# Patient Record
Sex: FEMALE | Race: WHITE | Employment: STUDENT | URBAN - METROPOLITAN AREA
[De-identification: names, ages, dates, MRNs, and addresses within clinical notes are randomized per-mention and may not be internally consistent; named-entity substitution may affect disease eponyms.]

---
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
## 2021-12-16 ENCOUNTER — OFFICE VISIT (OUTPATIENT)
Dept: FAMILY MEDICINE CLINIC | Age: 21
End: 2021-12-16
Payer: COMMERCIAL

## 2021-12-16 VITALS
SYSTOLIC BLOOD PRESSURE: 132 MMHG | BODY MASS INDEX: 43.4 KG/M2 | WEIGHT: 293 LBS | HEIGHT: 69 IN | HEART RATE: 105 BPM | RESPIRATION RATE: 20 BRPM | DIASTOLIC BLOOD PRESSURE: 88 MMHG | OXYGEN SATURATION: 97 %

## 2021-12-16 DIAGNOSIS — F41.9 ANXIETY: Primary | ICD-10-CM

## 2021-12-16 PROCEDURE — 99213 OFFICE O/P EST LOW 20 MIN: CPT | Performed by: FAMILY MEDICINE

## 2021-12-16 RX ORDER — HYDROXYZINE HYDROCHLORIDE 25 MG/1
25 TABLET, FILM COATED ORAL EVERY 8 HOURS PRN
Qty: 60 TABLET | Refills: 0 | Status: SHIPPED | OUTPATIENT
Start: 2021-12-16 | End: 2021-12-28

## 2021-12-16 RX ORDER — DUPILUMAB 300 MG/2ML
300 INJECTION, SOLUTION SUBCUTANEOUS
COMMUNITY
Start: 2021-12-16

## 2021-12-16 ASSESSMENT — PATIENT HEALTH QUESTIONNAIRE - PHQ9
SUM OF ALL RESPONSES TO PHQ QUESTIONS 1-9: 0
SUM OF ALL RESPONSES TO PHQ9 QUESTIONS 1 & 2: 0
SUM OF ALL RESPONSES TO PHQ QUESTIONS 1-9: 0
2. FEELING DOWN, DEPRESSED OR HOPELESS: 0
1. LITTLE INTEREST OR PLEASURE IN DOING THINGS: 0
SUM OF ALL RESPONSES TO PHQ QUESTIONS 1-9: 0

## 2021-12-26 ASSESSMENT — ENCOUNTER SYMPTOMS
NAUSEA: 0
SHORTNESS OF BREATH: 0
SORE THROAT: 0
COUGH: 0
RHINORRHEA: 0
VOMITING: 0
DIARRHEA: 0
BACK PAIN: 0
EYES NEGATIVE: 1
CHEST TIGHTNESS: 0
ABDOMINAL PAIN: 0

## 2021-12-26 NOTE — PROGRESS NOTES
[address] [place] [facility] [address]  Dept: [phone]  Dept Fax: [phone]  Loc: [phone]  PROGRESS NOTE      VisitDate: 12/16/2021    Carlotta Wilks is a 24 y.o. female who presents today for:     Chief Complaint   Patient presents with    Anxiety     grandma passed away this past weekend, several people she knows has covid, finished finals. Has always had anxiety but worse. Takes Melatonin at night to help sleep. Subjective:  HPI  Patient comes in with complaints of anxiety has been persisting and triggered by recent family and social events    Review of Systems   Constitutional: Negative for activity change, appetite change, fatigue and fever. HENT: Negative for congestion, rhinorrhea and sore throat. Eyes: Negative. Respiratory: Negative for cough, chest tightness and shortness of breath. Cardiovascular: Negative for chest pain and palpitations. Gastrointestinal: Negative for abdominal pain, diarrhea, nausea and vomiting. Genitourinary: Negative for dysuria and urgency. Musculoskeletal: Negative for arthralgias and back pain. Neurological: Negative for dizziness and headaches. Psychiatric/Behavioral: Negative for dysphoric mood. The patient is nervous/anxious. Past Medical History:   Diagnosis Date    Eczema       History reviewed. No pertinent surgical history.   Family History   Problem Relation Age of Onset    Diabetes Mother     Asthma Paternal Grandmother     Cancer Paternal Grandfather     Asthma Paternal Grandfather     Depression Father     Allergy (Severe) Father     Colon Cancer Paternal Aunt     Cancer Paternal Aunt         Ovarian     Social History     Tobacco Use    Smoking status: Never Smoker    Smokeless tobacco: Never Used   Substance Use Topics    Alcohol use: Not on file      Current Outpatient Medications   Medication Sig Dispense Refill    DUPIXENT 300 MG/2ML SOSY injection Inject 300 mg into the skin every 14 days       hydrOXYzine (ATARAX) 25 MG tablet Take 1 tablet by mouth every 8 hours as needed for Anxiety 60 tablet 0     No current facility-administered medications for this visit. No Known Allergies  Health Maintenance   Topic Date Due    Hepatitis C screen  Never done    COVID-19 Vaccine (1) Never done    HPV vaccine (3 - 2-dose series) 04/08/2011    HIV screen  Never done    Chlamydia screen  Never done    Pap smear  Never done    Flu vaccine (1) Never done    DTaP/Tdap/Td vaccine (6 - Td or Tdap) 08/01/2027    Hepatitis B vaccine  Completed    Hib vaccine  Completed    Varicella vaccine  Completed    Meningococcal (ACWY) vaccine  Completed    Hepatitis A vaccine  Aged Out    Pneumococcal 0-64 years Vaccine  Aged Out         Objective:     Physical Exam  Constitutional:       General: She is not in acute distress. Appearance: She is well-developed. She is not diaphoretic. HENT:      Head: Normocephalic and atraumatic. Eyes:      General: No scleral icterus. Conjunctiva/sclera: Conjunctivae normal.   Neck:      Thyroid: No thyromegaly. Vascular: No JVD. Comments: No bruits  Cardiovascular:      Rate and Rhythm: Normal rate and regular rhythm. Heart sounds: Normal heart sounds. Pulmonary:      Effort: Pulmonary effort is normal. No respiratory distress. Breath sounds: Normal breath sounds. No wheezing or rales. Abdominal:      Palpations: Abdomen is soft. There is no mass. Tenderness: There is no abdominal tenderness. There is no guarding. Musculoskeletal:         General: No tenderness. Skin:     General: Skin is warm and dry. Findings: No rash. Neurological:      Mental Status: She is alert and oriented to person, place, and time. Cranial Nerves: No cranial nerve deficit.        /88   Pulse 105   Resp 20   Ht 5' 8.5\" (1.74 m)   Wt (!) 325 lb (147.4 kg)   SpO2 97%   BMI 48.70 kg/m²       Impression/Plan:  1. Anxiety      Requested Prescriptions     Signed Prescriptions Disp Refills    hydrOXYzine (ATARAX) 25 MG tablet 60 tablet 0     Sig: Take 1 tablet by mouth every 8 hours as needed for Anxiety     No orders of the defined types were placed in this encounter. Patient giveneducational materials - see patient instructions. Discussed use, benefit, and side effects of prescribed medications. All patient questions answered. Pt voiced understanding. Reviewed health maintenance. Patient agreedwith treatment plan. Follow up as directed. **This report has been created using voice recognition software. It may contain minor errorswhich are inherent in voice recognition technology. **       Electronically signed by Alberto Sarah MD on 12/26/2021 at 10:27 AM

## 2021-12-28 ENCOUNTER — OFFICE VISIT (OUTPATIENT)
Dept: FAMILY MEDICINE CLINIC | Age: 21
End: 2021-12-28
Payer: COMMERCIAL

## 2021-12-28 VITALS
TEMPERATURE: 98.3 F | HEIGHT: 68 IN | WEIGHT: 293 LBS | RESPIRATION RATE: 20 BRPM | BODY MASS INDEX: 44.41 KG/M2 | DIASTOLIC BLOOD PRESSURE: 84 MMHG | SYSTOLIC BLOOD PRESSURE: 124 MMHG | HEART RATE: 84 BPM

## 2021-12-28 DIAGNOSIS — F43.22 ADJUSTMENT DISORDER WITH ANXIOUS MOOD: Primary | ICD-10-CM

## 2021-12-28 PROCEDURE — 99213 OFFICE O/P EST LOW 20 MIN: CPT | Performed by: FAMILY MEDICINE

## 2021-12-28 RX ORDER — ESCITALOPRAM OXALATE 10 MG/1
10 TABLET ORAL DAILY
Qty: 30 TABLET | Refills: 5 | Status: ON HOLD | OUTPATIENT
Start: 2021-12-28 | End: 2022-01-11 | Stop reason: HOSPADM

## 2021-12-28 RX ORDER — CLONAZEPAM 0.5 MG/1
TABLET ORAL
Qty: 60 TABLET | Refills: 0 | Status: ON HOLD | OUTPATIENT
Start: 2021-12-28 | End: 2022-01-11 | Stop reason: HOSPADM

## 2021-12-28 RX ORDER — CLONAZEPAM 0.5 MG/1
0.5 TABLET ORAL 2 TIMES DAILY PRN
COMMUNITY
Start: 2021-12-19 | End: 2021-12-28 | Stop reason: SDUPTHER

## 2021-12-28 SDOH — ECONOMIC STABILITY: FOOD INSECURITY: WITHIN THE PAST 12 MONTHS, THE FOOD YOU BOUGHT JUST DIDN'T LAST AND YOU DIDN'T HAVE MONEY TO GET MORE.: NEVER TRUE

## 2021-12-28 SDOH — ECONOMIC STABILITY: FOOD INSECURITY: WITHIN THE PAST 12 MONTHS, YOU WORRIED THAT YOUR FOOD WOULD RUN OUT BEFORE YOU GOT MONEY TO BUY MORE.: NEVER TRUE

## 2021-12-28 ASSESSMENT — SOCIAL DETERMINANTS OF HEALTH (SDOH): HOW HARD IS IT FOR YOU TO PAY FOR THE VERY BASICS LIKE FOOD, HOUSING, MEDICAL CARE, AND HEATING?: NOT HARD AT ALL

## 2022-01-02 ASSESSMENT — ENCOUNTER SYMPTOMS
RHINORRHEA: 0
VOMITING: 0
COUGH: 0
BACK PAIN: 0
EYES NEGATIVE: 1
SORE THROAT: 0
SHORTNESS OF BREATH: 0
DIARRHEA: 0
ABDOMINAL PAIN: 0
CHEST TIGHTNESS: 0
NAUSEA: 0

## 2022-01-02 NOTE — PROGRESS NOTES
Refill    escitalopram (LEXAPRO) 10 MG tablet Take 1 tablet by mouth daily 30 tablet 5    clonazePAM (KLONOPIN) 0.5 MG tablet 0.25-0.5mg bid prn 60 tablet 0    DUPIXENT 300 MG/2ML SOSY injection Inject 300 mg into the skin every 14 days        No current facility-administered medications for this visit. No Known Allergies  Health Maintenance   Topic Date Due    Hepatitis C screen  Never done    COVID-19 Vaccine (1) Never done    HPV vaccine (3 - 2-dose series) 04/08/2011    HIV screen  Never done    Chlamydia screen  Never done    Pap smear  Never done    Flu vaccine (1) 12/28/2022 (Originally 9/1/2021)    Depression Screen  12/16/2022    DTaP/Tdap/Td vaccine (6 - Td or Tdap) 08/01/2027    Hepatitis B vaccine  Completed    Hib vaccine  Completed    Varicella vaccine  Completed    Meningococcal (ACWY) vaccine  Completed    Hepatitis A vaccine  Aged Out    Pneumococcal 0-64 years Vaccine  Aged Out         Objective:     Physical Exam  Constitutional:       General: She is not in acute distress. Appearance: She is well-developed. She is not diaphoretic. HENT:      Head: Normocephalic and atraumatic. Eyes:      General: No scleral icterus. Conjunctiva/sclera: Conjunctivae normal.   Neck:      Thyroid: No thyromegaly. Vascular: No JVD. Comments: No bruits  Cardiovascular:      Rate and Rhythm: Normal rate and regular rhythm. Heart sounds: Normal heart sounds. Pulmonary:      Effort: Pulmonary effort is normal. No respiratory distress. Breath sounds: Normal breath sounds. No wheezing or rales. Abdominal:      Palpations: Abdomen is soft. There is no mass. Tenderness: There is no abdominal tenderness. There is no guarding. Musculoskeletal:         General: No tenderness. Skin:     General: Skin is warm and dry. Findings: No rash. Neurological:      Mental Status: She is alert and oriented to person, place, and time.       Cranial Nerves: No cranial nerve deficit. /84   Pulse 84   Temp 98.3 °F (36.8 °C) (Oral)   Resp 20   Ht 5' 8\" (1.727 m)   Wt (!) 324 lb (147 kg)   LMP 2021 (Approximate)   BMI 49.26 kg/m²       Impression/Plan:  1. Adjustment disorder with anxious mood      Requested Prescriptions     Signed Prescriptions Disp Refills    escitalopram (LEXAPRO) 10 MG tablet 30 tablet 5     Sig: Take 1 tablet by mouth daily    clonazePAM (KLONOPIN) 0.5 MG tablet 60 tablet 0     Si.25-0.5mg bid prn     No orders of the defined types were placed in this encounter. Patient giveneducational materials - see patient instructions. Discussed use, benefit, and side effects of prescribed medications. All patient questions answered. Pt voiced understanding. Reviewed health maintenance. Patient agreedwith treatment plan. Follow up as directed. **This report has been created using voice recognition software. It may contain minor errorswhich are inherent in voice recognition technology. **       Electronically signed by Tha Singh MD on 2022 at 8:12 AM

## 2022-01-09 ENCOUNTER — HOSPITAL ENCOUNTER (INPATIENT)
Age: 22
LOS: 2 days | Discharge: HOME | DRG: 885 | End: 2022-01-11
Attending: EMERGENCY MEDICINE | Admitting: PSYCHIATRY & NEUROLOGY
Payer: COMMERCIAL

## 2022-01-09 DIAGNOSIS — R45.851 SUICIDAL IDEATION: Primary | ICD-10-CM

## 2022-01-09 PROBLEM — F33.9 MDD (MAJOR DEPRESSIVE DISORDER), RECURRENT EPISODE (HCC): Status: ACTIVE | Noted: 2022-01-09

## 2022-01-09 LAB
ACETAMINOPHEN LEVEL: < 5 UG/ML (ref 0–20)
ALBUMIN SERPL-MCNC: 4.6 G/DL (ref 3.5–5.1)
ALP BLD-CCNC: 140 U/L (ref 38–126)
ALT SERPL-CCNC: 28 U/L (ref 11–66)
AMPHETAMINE+METHAMPHETAMINE URINE SCREEN: NEGATIVE
ANION GAP SERPL CALCULATED.3IONS-SCNC: 10 MEQ/L (ref 8–16)
AST SERPL-CCNC: 22 U/L (ref 5–40)
BARBITURATE QUANTITATIVE URINE: NEGATIVE
BASOPHILS # BLD: 0.4 %
BASOPHILS ABSOLUTE: 0 THOU/MM3 (ref 0–0.1)
BENZODIAZEPINE QUANTITATIVE URINE: NEGATIVE
BILIRUB SERPL-MCNC: 0.2 MG/DL (ref 0.3–1.2)
BUN BLDV-MCNC: 8 MG/DL (ref 7–22)
CALCIUM SERPL-MCNC: 9.3 MG/DL (ref 8.5–10.5)
CANNABINOID QUANTITATIVE URINE: NEGATIVE
CHLORIDE BLD-SCNC: 103 MEQ/L (ref 98–111)
CO2: 26 MEQ/L (ref 23–33)
COCAINE METABOLITE QUANTITATIVE URINE: NEGATIVE
CREAT SERPL-MCNC: 0.8 MG/DL (ref 0.4–1.2)
EOSINOPHIL # BLD: 0.8 %
EOSINOPHILS ABSOLUTE: 0.1 THOU/MM3 (ref 0–0.4)
ERYTHROCYTE [DISTWIDTH] IN BLOOD BY AUTOMATED COUNT: 13.1 % (ref 11.5–14.5)
ERYTHROCYTE [DISTWIDTH] IN BLOOD BY AUTOMATED COUNT: 40.4 FL (ref 35–45)
ETHYL ALCOHOL, SERUM: < 0.01 %
GFR SERPL CREATININE-BSD FRML MDRD: 89 ML/MIN/1.73M2
GLUCOSE BLD-MCNC: 119 MG/DL (ref 70–108)
HCT VFR BLD CALC: 46.9 % (ref 37–47)
HEMOGLOBIN: 14.9 GM/DL (ref 12–16)
IMMATURE GRANS (ABS): 0.02 THOU/MM3 (ref 0–0.07)
IMMATURE GRANULOCYTES: 0.2 %
LYMPHOCYTES # BLD: 10.6 %
LYMPHOCYTES ABSOLUTE: 0.9 THOU/MM3 (ref 1–4.8)
MCH RBC QN AUTO: 27.1 PG (ref 26–33)
MCHC RBC AUTO-ENTMCNC: 31.8 GM/DL (ref 32.2–35.5)
MCV RBC AUTO: 85.4 FL (ref 81–99)
MONOCYTES # BLD: 6.3 %
MONOCYTES ABSOLUTE: 0.5 THOU/MM3 (ref 0.4–1.3)
NUCLEATED RED BLOOD CELLS: 0 /100 WBC
OPIATES, URINE: NEGATIVE
OSMOLALITY CALCULATION: 277 MOSMOL/KG (ref 275–300)
OXYCODONE: NEGATIVE
PHENCYCLIDINE QUANTITATIVE URINE: NEGATIVE
PLATELET # BLD: 234 THOU/MM3 (ref 130–400)
PMV BLD AUTO: 9.5 FL (ref 9.4–12.4)
POTASSIUM REFLEX MAGNESIUM: 4.3 MEQ/L (ref 3.5–5.2)
PREGNANCY, SERUM: NEGATIVE
RBC # BLD: 5.49 MILL/MM3 (ref 4.2–5.4)
SALICYLATE, SERUM: < 0.3 MG/DL (ref 2–10)
SEG NEUTROPHILS: 81.7 %
SEGMENTED NEUTROPHILS ABSOLUTE COUNT: 6.9 THOU/MM3 (ref 1.8–7.7)
SODIUM BLD-SCNC: 139 MEQ/L (ref 135–145)
T4 FREE: 1.34 NG/DL (ref 0.93–1.76)
TOTAL PROTEIN: 8.4 G/DL (ref 6.1–8)
TROPONIN T: < 0.01 NG/ML
TSH SERPL DL<=0.05 MIU/L-ACNC: 0.68 UIU/ML (ref 0.4–4.2)
WBC # BLD: 8.5 THOU/MM3 (ref 4.8–10.8)

## 2022-01-09 PROCEDURE — 6370000000 HC RX 637 (ALT 250 FOR IP): Performed by: PSYCHIATRY & NEUROLOGY

## 2022-01-09 PROCEDURE — 82077 ASSAY SPEC XCP UR&BREATH IA: CPT

## 2022-01-09 PROCEDURE — 80053 COMPREHEN METABOLIC PANEL: CPT

## 2022-01-09 PROCEDURE — 84703 CHORIONIC GONADOTROPIN ASSAY: CPT

## 2022-01-09 PROCEDURE — 1240000000 HC EMOTIONAL WELLNESS R&B

## 2022-01-09 PROCEDURE — 80143 DRUG ASSAY ACETAMINOPHEN: CPT

## 2022-01-09 PROCEDURE — 80179 DRUG ASSAY SALICYLATE: CPT

## 2022-01-09 PROCEDURE — 99285 EMERGENCY DEPT VISIT HI MDM: CPT

## 2022-01-09 PROCEDURE — 84443 ASSAY THYROID STIM HORMONE: CPT

## 2022-01-09 PROCEDURE — 36415 COLL VENOUS BLD VENIPUNCTURE: CPT

## 2022-01-09 PROCEDURE — 85025 COMPLETE CBC W/AUTO DIFF WBC: CPT

## 2022-01-09 PROCEDURE — 84439 ASSAY OF FREE THYROXINE: CPT

## 2022-01-09 PROCEDURE — 84484 ASSAY OF TROPONIN QUANT: CPT

## 2022-01-09 PROCEDURE — 80307 DRUG TEST PRSMV CHEM ANLYZR: CPT

## 2022-01-09 RX ORDER — HYDROXYZINE HYDROCHLORIDE 25 MG/1
50 TABLET, FILM COATED ORAL 3 TIMES DAILY PRN
Status: DISCONTINUED | OUTPATIENT
Start: 2022-01-09 | End: 2022-01-09

## 2022-01-09 RX ORDER — TRAZODONE HYDROCHLORIDE 50 MG/1
50 TABLET ORAL NIGHTLY PRN
Status: DISCONTINUED | OUTPATIENT
Start: 2022-01-09 | End: 2022-01-11 | Stop reason: HOSPADM

## 2022-01-09 RX ORDER — POLYETHYLENE GLYCOL 3350 17 G/17G
17 POWDER, FOR SOLUTION ORAL DAILY PRN
Status: DISCONTINUED | OUTPATIENT
Start: 2022-01-09 | End: 2022-01-11 | Stop reason: HOSPADM

## 2022-01-09 RX ORDER — MAGNESIUM HYDROXIDE/ALUMINUM HYDROXICE/SIMETHICONE 120; 1200; 1200 MG/30ML; MG/30ML; MG/30ML
30 SUSPENSION ORAL EVERY 6 HOURS PRN
Status: DISCONTINUED | OUTPATIENT
Start: 2022-01-09 | End: 2022-01-11 | Stop reason: HOSPADM

## 2022-01-09 RX ORDER — ACETAMINOPHEN 325 MG/1
650 TABLET ORAL EVERY 4 HOURS PRN
Status: DISCONTINUED | OUTPATIENT
Start: 2022-01-09 | End: 2022-01-11 | Stop reason: HOSPADM

## 2022-01-09 RX ORDER — HYDROXYZINE PAMOATE 50 MG/1
50 CAPSULE ORAL 4 TIMES DAILY PRN
Status: DISCONTINUED | OUTPATIENT
Start: 2022-01-09 | End: 2022-01-11 | Stop reason: HOSPADM

## 2022-01-09 RX ORDER — IBUPROFEN 400 MG/1
400 TABLET ORAL EVERY 6 HOURS PRN
Status: DISCONTINUED | OUTPATIENT
Start: 2022-01-09 | End: 2022-01-11 | Stop reason: HOSPADM

## 2022-01-09 RX ADMIN — TRAZODONE HYDROCHLORIDE 50 MG: 50 TABLET ORAL at 21:08

## 2022-01-09 RX ADMIN — HYDROXYZINE PAMOATE 50 MG: 50 CAPSULE ORAL at 22:25

## 2022-01-09 ASSESSMENT — SLEEP AND FATIGUE QUESTIONNAIRES
RESTFUL SLEEP: NO
DO YOU USE A SLEEP AID: NO
DIFFICULTY STAYING ASLEEP: YES
DIFFICULTY FALLING ASLEEP: YES
DIFFICULTY ARISING: NO
SLEEP PATTERN: DIFFICULTY FALLING ASLEEP;DISTURBED/INTERRUPTED SLEEP
DO YOU HAVE DIFFICULTY SLEEPING: YES

## 2022-01-09 ASSESSMENT — PAIN SCALES - GENERAL
PAINLEVEL_OUTOF10: 0
PAINLEVEL_OUTOF10: 0

## 2022-01-09 ASSESSMENT — PATIENT HEALTH QUESTIONNAIRE - PHQ9: SUM OF ALL RESPONSES TO PHQ QUESTIONS 1-9: 20

## 2022-01-09 ASSESSMENT — LIFESTYLE VARIABLES: HISTORY_ALCOHOL_USE: NO

## 2022-01-09 NOTE — ED TRIAGE NOTES
thoughs of hurting self, but no plan. Had a couple of family members recently pass away and has had depression and anixety for last month. Can not find rlief.

## 2022-01-09 NOTE — ED NOTES
Pt transported to [phone] accompanied by campus police.      Lissette Elias, EMT-P  01/09/22 5038

## 2022-01-09 NOTE — PROGRESS NOTES
Chief Complaint:    Interrupted Suicide Attempt      Provisional Diagnosis: Unspecified Depressive Disorder       Risk, Psychosocial and Contextual Factors: (homeless, lack of social support etc.): Paternal grandmother  a month ago, maternal uncle committed suicide a week ago       Current MH Treatment:  PCP prescribed psychotropic medication, pt is awaiting a response from the [address] [facility] in [facility] to initiate counseling services       Present Suicidal Behavior:    Verbal: Denies current suicidal thoughts     Attempt: Pt planned to overdose on her medication today however her brother walked in, interrupted attempt       Access to Weapons: Denies      C-SSRS Current Suicide Risk: Low, Moderate or High:    High      Past Suicidal Behavior:    Verbal:  X (Suicidal thoughts during the last couple of days)    Attempts: Denies       Self-Injurious/Self-Mutilation:  Denies       Traumatic Event Within Past 2 Weeks: Maternal uncle committed suicide       Current Abuse:  Denies       Legal: Denies       Violence: Denies       Protective Factors:  Family are supportive, pt is seeking mental health treatment       Housing: Resides with mother, father and two brothers       CPAP/Oxygen/Ambulation Difficulties: Denies       Basic Vital Signs: See epic      Critical Labs:      Risk Factors: Interrupted suicide attempt today      Clinical Summary:      Pt is a 24year old female with a reported history of anxiety and depression escorted to [facility] ED by her mother and brother due to an interrupted suicide attempt. Pt reportedly was going to attempt suicide by overdose today, reportedly had the pills and was going to do so however her brother walked in \"then I didn't want to do it anymore\".  Pt report struggling with depression and anxiety during the last month, started on klonopin and lexapro by PCP on 21, is compliant with taking prescribed medication, no history of inpatient psychiatric treatment, pt is attempting to be linked to outpatient counseling with the [facility] in [facility] [place] \"I'm waiting on a call back\". Pt state her paternal grandmother  a month ago, maternal uncle committed suicide a week ago \"and I seen him, I can't get it out of my head\". 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 Pt is employed at [employer] however currently on a leave of absence due to anxiety and depression. Pt denies drug/alcohol use, report loss of sleep, increased appetite since starting medication. Pt is alert, oriented x4, good insight, impaired judgment, linear thought, good eye contact, cooperative. Level of Care Disposition:      9462  Pt given a bottle of water as requested, no further needs expressed. 1615  Pt sitting on cot talking to mother and brother. 1636  Pt has a small urine sample, ED Charge Nurse Paige Rod informed. UDS needed to consult with the on-call psychiatrist.    1640  Urine sample collected by ED Nurse. 46  Pt medically cleared by Dr. Scout Guillen.    [id number]  Consult with Dr. Carlyle Jack who recommend inpatient psychiatric treatment and authorized admission to .    1722  Pt and her family updated, pt reviewed and signed the voluntary admission form. 1727  Report called to Nurse Gracy Lyon on 4E, pt to be admitted to -66B.

## 2022-01-09 NOTE — PROGRESS NOTES
Behavioral Health   Admission Note     Admission Type:        Reason for admission:       PATIENT STRENGTHS:  Strengths: Positive Support,Communication,Medication Compliance    Patient Strengths and Limitations:       Addictive Behavior:   Addictive Behavior  In the past 3 months, have you felt or has someone told you that you have a problem with:  : None  Do you have a history of Chemical Use?: No  Do you have a history of Alcohol Use?: No  Do you have a history of Street Drug Abuse?: No  Histroy of Prescripton Drug Abuse?: No    Medical Problems:   Past Medical History:   Diagnosis Date    Eczema        Status EXAM:  Status and Exam  Normal: No  Affect: Constricted  Level of Consciousness: Alert  Mood:Normal: No  Mood: Depressed,Anxious  Motor Activity:Normal: Yes  Interview Behavior: Cooperative  Preception: [place] to Person,[place] to Time,[place] to Place,[place] to Situation  Attention:Normal: Yes  Thought Processes: Other(See comment) (Linear)  Thought Content:Normal: Yes  Hallucinations: None  Delusions: No  Memory:Normal: Yes  Insight and Judgment: No  Insight and Judgment: Poor Judgment  Present Suicidal Ideation: No (No current suicidal thoughts, interrupted suicide attempt today)  Present Homicidal Ideation: No    Pt admitted with followings belongings:        Admission order obtained YES. Valuables sent home with N/A. Valuables placed in safe in security envelope, number:  N/A. Patient's home medications were N/A. Patient oriented to surroundings and program expectations and copy of patient rights given. Received admission packet:  YES. Consents reviewed, signed YES. \"An Important Message from [employer] About Your Rights\" form reviewed, signed N/A. Refused N/A. Patient verbalize understanding:  YES. Patient education on precautions: YES           Patient screened positive for suicide risk on CSSR-S (\"yes\" to question #4, 5, OR 6)  N/A. Physician notified of risk score.   Orders received N/A .  2 person skin assessment completed upon admission pt refused 2 person skin assessment. Explained patients right to have family, representative or physician notified of their admission. Patient has Declined for physician to be notified. Patient has Declined for family/representative to be notified. Provided pt with [employer] Online handout entitled \"Quitting Smoking. \"  Reviewed handout with pt addressing dangers of smoking, developing coping skills, and providing basic information about quitting. Pt response to counseling:  Pt does not smoke. Pt came to 4E from the [facility]. She has an interrupted suicide attempt today. 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 Her grandmother  a month ago and her uncle committed suicide a week ago. She has been waiting for [id number] to get back to her for counseling. Her anxiety was too much to bear today and the Klonopin was not working, nor was the Lexapro. Hyroxyzine gave her a panic attack. She lives at home with her mother and brothers. She states that she has lots of support.           Haley Santiago RN

## 2022-01-09 NOTE — ED PROVIDER NOTES
[address] [facility] [address] [address] EMERGENCY DEPT    EMERGENCY MEDICINE     Pt Name: Emery Plasencia  MRN: [id number]  [place] 2000  Date of evaluation: 1/9/2022  Provider: Fredrick Ramos DO, FACEP    CHIEF COMPLAINT     No chief complaint on file. HISTORY OF PRESENT ILLNESS    Emery Plasencia is a pleasant 24 y.o. female   Presents to the emergency department from home   Thoughts of self harm today with plans to take pills to end her life  Did not ingest any medications other than 1 dose of Klonopin  Has been depressed  No recent illnesses  No auditory or visual hallucinations      Triage notes and Nursing notes were reviewed by myself. Any discrepancies are addressed above. PAST MEDICAL HISTORY     Past Medical History:   Diagnosis Date    Eczema        SURGICAL HISTORY     No past surgical history on file. CURRENT MEDICATIONS       Previous Medications    CLONAZEPAM (KLONOPIN) 0.5 MG TABLET    0.25-0.5mg bid prn    DUPIXENT 300 MG/2ML SOSY INJECTION    Inject 300 mg into the skin every 14 days     ESCITALOPRAM (LEXAPRO) 10 MG TABLET    Take 1 tablet by mouth daily       ALLERGIES     Patient has no known allergies.     FAMILY HISTORY       Family History   Problem Relation Age of Onset    Diabetes Mother     Asthma Paternal Grandmother     Cancer Paternal Grandfather     Asthma Paternal Grandfather     Depression Father     Allergy (Severe) Father     Colon Cancer Paternal Aunt     Cancer Paternal Aunt         Ovarian        SOCIAL HISTORY       Social History     Socioeconomic History    Marital status: Single     Spouse name: Not on file    Number of children: Not on file    Years of education: Not on file    Highest education level: Not on file   Occupational History    Not on file   Tobacco Use    Smoking status: Never Smoker    Smokeless tobacco: Never Used   Substance and Sexual Activity    Alcohol use: Not on file    Drug use: Not on file    Sexual activity: Not on file   Other Topics Concern    Not on file   Social History Narrative    Not on file     Social Determinants of Health     Financial Resource Strain: Low Risk     Difficulty of Paying Living Expenses: Not hard at all   Food Insecurity: No Food Insecurity    Worried About Running Out of Food in the Last Year: Never true    [address] in the Last Year: Never true   Transportation Needs:     Lack of Transportation (Medical): Not on file    Lack of Transportation (Non-Medical): Not on file   Physical Activity:     Days of Exercise per Week: Not on file    Minutes of Exercise per Session: Not on file   Stress:     Feeling of Stress : Not on file   Social Connections:     Frequency of Communication with Friends and Family: Not on file    Frequency of Social Gatherings with Friends and Family: Not on file    Attends [employer] Services: Not on file    Active Member of [address] [place] [address] [employer] or Organizations: Not on file    Attends Club or Organization Meetings: Not on file    Marital Status: Not on file   Intimate Partner Violence:     Fear of Current or Ex-Partner: Not on file    Emotionally Abused: Not on file    Physically Abused: Not on file    Sexually Abused: Not on file   Housing Stability:     Unable to Pay for Housing in the Last Year: Not on file    Number of [place] in the Last Year: Not on file    Unstable Housing in the Last Year: Not on file       REVIEW OF SYSTEMS     Review of Systems   Constitutional: Negative for chills and fever. Psychiatric/Behavioral: Positive for suicidal ideas. Negative for self-injury. Except as noted above the remainder of the review of systems was reviewed and is. PHYSICAL EXAM    (up to 7 for level 4, 8 or more for level 5)     ED Triage Vitals [01/09/22 1359]   BP Temp Temp Source Pulse Resp SpO2 Height Weight   (!) 150/100 98.7 °F (37.1 °C) Oral 98 18 97 % 5' 8\" (1.727 m) (!) 325 lb (147.4 kg)       Physical Exam  Vitals and nursing note reviewed.    Constitutional: Appearance: She is well-developed. HENT:      Head: Normocephalic. Eyes:      Conjunctiva/sclera: Conjunctivae normal.      Pupils: Pupils are equal, round, and reactive to light. Neck:      Trachea: No tracheal deviation. Pulmonary:      Effort: Pulmonary effort is normal.   Musculoskeletal:         General: Normal range of motion. Cervical back: Neck supple. Skin:     General: Skin is warm and dry. Neurological:      Mental Status: She is alert and oriented to person, place, and time. Cranial Nerves: No cranial nerve deficit. DIAGNOSTIC RESULTS     EKG:(none if blank)  All E[facility]'s are [facility] who either signs or Co-signs this chart in the absence of a cardiologist.        RADIOLOGY: (none if blank)   Interpretation per the Radiologistbelow, if available at the time of this note:    No orders to display       LABS:  Labs Reviewed   CBC WITH AUTO DIFFERENTIAL - Abnormal; Notable for the following components:       Result Value    RBC 5.49 (*)     MCHC 31.8 (*)     Lymphocytes Absolute 0.9 (*)     All other components within normal limits   COMPREHENSIVE METABOLIC PANEL W/ REFLEX TO MG FOR LOW K - Abnormal; Notable for the following components:    Glucose 119 (*)     Alkaline Phosphatase 140 (*)     Total Protein 8.4 (*)     Total Bilirubin 0.2 (*)     All other components within normal limits   SALICYLATE LEVEL - Abnormal; Notable for the following components:    Salicylate, Serum < 0.3 (*)     All other components within normal limits   GLOMERULAR FILTRATION RATE, ESTIMATED - Abnormal; Notable for the following components:    Est, Glom Filt Rate 89 (*)     All other components within normal limits   TROPONIN   HCG, SERUM, QUALITATIVE   ACETAMINOPHEN LEVEL   ETHANOL   TSH WITH REFLEX   ANION GAP   OSMOLALITY   URINE DRUG SCREEN       All other labs were within normal range or not returned as of this dictation.   Please note, any cultures that may have been sent were not resulted at the time of this patient visit. EMERGENCY DEPARTMENT COURSE andMedical Decision Making:     MDM/  ED Course as of 01/09/22 1711   Carlos Alberto Smith Jan 09, 2022   1708 Based on physical exam, clinical presentation, and laboratory evaluation there is no medical reason to prevent him from a psychiatric hospitazation.     [AB]      ED Course User Index  [AB] Nicole Nguyen DO       ED Medications administered this visit:  Medications - No data to display      Procedures: (None if blank)      The care of the patient took place at a time of a significant regional and national Covid-19 surge, resulting in severe overcrowding and limitation of healthcare resources, including nursing staffing and inpatient beds. CLINICAL IMPRESSION     1. Suicidal ideation          DISPOSITION/PLAN   DISPOSITION Decision To Admit 01/09/2022 05:11:02 PM      PATIENT REFERRED TO:  No follow-up provider specified.     DISCHARGE MEDICATIONS:  New Prescriptions    No medications on file           (Please note that portions of this note were completed with a voice recognition program.  Efforts were made to edit the dictations but occasionallywords are mis-transcribed.)      Karlie Long DO,LETICIA (electronically signed)  Attending Physician, Emergency [address] [place] [address] DO oJaquin  01/09/22 1711

## 2022-01-10 ENCOUNTER — APPOINTMENT (OUTPATIENT)
Dept: GENERAL RADIOLOGY | Age: 22
DRG: 885 | End: 2022-01-10
Payer: COMMERCIAL

## 2022-01-10 PROBLEM — F32.A DEPRESSION WITH SUICIDAL IDEATION: Status: ACTIVE | Noted: 2022-01-10

## 2022-01-10 PROBLEM — F32.2 MDD (MAJOR DEPRESSIVE DISORDER), SINGLE EPISODE, SEVERE , NO PSYCHOSIS (HCC): Status: ACTIVE | Noted: 2022-01-09

## 2022-01-10 PROBLEM — R45.851 DEPRESSION WITH SUICIDAL IDEATION: Status: ACTIVE | Noted: 2022-01-10

## 2022-01-10 PROBLEM — F41.0 PANIC DISORDER WITHOUT AGORAPHOBIA: Chronic | Status: ACTIVE | Noted: 2022-01-10

## 2022-01-10 LAB
INFLUENZA A: NOT DETECTED
INFLUENZA B: NOT DETECTED
SARS-COV-2 RNA, RT PCR: DETECTED

## 2022-01-10 PROCEDURE — 99232 SBSQ HOSP IP/OBS MODERATE 35: CPT | Performed by: HOSPITALIST

## 2022-01-10 PROCEDURE — 87636 SARSCOV2 & INF A&B AMP PRB: CPT

## 2022-01-10 PROCEDURE — 71046 X-RAY EXAM CHEST 2 VIEWS: CPT

## 2022-01-10 PROCEDURE — 6370000000 HC RX 637 (ALT 250 FOR IP): Performed by: PSYCHIATRY & NEUROLOGY

## 2022-01-10 PROCEDURE — 1240000000 HC EMOTIONAL WELLNESS R&B

## 2022-01-10 RX ORDER — ESCITALOPRAM OXALATE 20 MG/1
20 TABLET ORAL NIGHTLY
Status: DISCONTINUED | OUTPATIENT
Start: 2022-01-10 | End: 2022-01-11 | Stop reason: HOSPADM

## 2022-01-10 RX ORDER — ALBUTEROL SULFATE 90 UG/1
2 AEROSOL, METERED RESPIRATORY (INHALATION) EVERY 4 HOURS PRN
Status: DISCONTINUED | OUTPATIENT
Start: 2022-01-10 | End: 2022-01-11 | Stop reason: HOSPADM

## 2022-01-10 RX ADMIN — HYDROXYZINE PAMOATE 50 MG: 50 CAPSULE ORAL at 09:57

## 2022-01-10 RX ADMIN — TRAZODONE HYDROCHLORIDE 50 MG: 50 TABLET ORAL at 21:58

## 2022-01-10 RX ADMIN — IBUPROFEN 400 MG: 400 TABLET, FILM COATED ORAL at 09:57

## 2022-01-10 RX ADMIN — ESCITALOPRAM OXALATE 20 MG: 20 TABLET ORAL at 21:57

## 2022-01-10 ASSESSMENT — PAIN SCALES - GENERAL
PAINLEVEL_OUTOF10: 3
PAINLEVEL_OUTOF10: 3

## 2022-01-10 ASSESSMENT — PAIN - FUNCTIONAL ASSESSMENT: PAIN_FUNCTIONAL_ASSESSMENT: ACTIVITIES ARE NOT PREVENTED

## 2022-01-10 ASSESSMENT — PAIN DESCRIPTION - FREQUENCY: FREQUENCY: INTERMITTENT

## 2022-01-10 NOTE — PROGRESS NOTES
Pt has a good support system. 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 Pt does feel that therapy would be beneficial and is attempting to get connected to [facility]. Family is positive for anxiety and depression in her father and a brother. She has an uncle who she was not close to who had alcoholism and completed suicide.

## 2022-01-10 NOTE — PROGRESS NOTES
Spoke with patient's parents with Patient's verbal permission after they arrived for visitation. Explained patient's current isolation status and current plan of care. Patient's parents verbalize understanding and agreement. MD made aware.

## 2022-01-10 NOTE — H&P
[address] [place] [address]                              HISTORY AND PHYSICAL    PATIENT NAME: Joseph Hsu                   :        2000  MED REC NO:   [id number]                           ROOM:       [id number]  ACCOUNT NO:   [de-identified]                           ADMIT DATE: 2022  PROVIDER:     Sharlene Sparks M.D. IDENTIFYING INFORMATION:  The patient is a 69-year-old single   female. She has no children. She lives with her parents and two of her  brothers. She works part time at [employer]. She is also a full-time  student at [employer]. CHIEF COMPLAINT:  \"I tried to kill myself yesterday. \"    HISTORY OF PRESENT ILLNESS:  The patient presented to  [facility] ED with her mother and one of her brothers due to an interrupted  suicide attempt. She was going to take an overdose of her medication  and one of her brothers walked on her. She reports she has been feeling  depressed and anxious for about four weeks since her paternal  grandmother . She was very close to her paternal grandmother. Moreover her maternal uncle committed suicide two weeks ago. She went  to the hospital with her mother and saw her  uncle. She cannot  get that picture off her mind. There is no trigger about it, but she  feels that she is having some type of flashback. She has trouble  getting and staying asleep. She feels helpless. She has poor energy,  poor concentration, psychomotor retardation and she has been having  suicidal thoughts for about three days. She denies hypomanic or manic  symptoms. No psychotic symptoms. She has a history of mild anxiety  since she was in grade school, but for the past four weeks, anxiety  symptoms have been very debilitating. She feels anxious most of the  time.   She reports anxiety attack associated with chest pressure,  shortness of breath, feeling shaky, sweaty, feeling sick to her stomach,  feeling she is going to pass out. Those symptoms will last for about 5  minutes. There is no trigger for them. She denies obsessive-compulsive  disorder symptoms. No eating disorder symptoms. No history of abuse. PAST PSYCHIATRIC HISTORY:  This is her first psychiatric admission. No  history of suicide attempt other than she had an aborted suicide by  overdose yesterday. She has a history of mild anxiety, but has been  very anxious for the past four weeks. No prior history of depression. She was started on escitalopram and clonazepam by her family physician  about two weeks ago. She took hydroxyzine and felt more anxious, which  may not be related to it. FAMILY HISTORY:  Father and two brothers with anxiety and depression,  they are not on psychotropics. Two uncles, one on each side, with  depression. Her maternal uncle committed suicide two weeks ago. No  family history of illicit drugs or alcohol. SOCIAL HISTORY:  The patient was born in [facility], raised in [place] [facility]. Parents are . She has three full brothers and a  half sister on her mother's side. She lives with her parents and two  brothers. Her parents and two best friends are her primary support. She graduated from high school. 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 She had a 2.8 GPA, but she  decided not to go to school this semester due to her depression and  anxiety. She has been working part time at [employer] for about seven  years. She took a leave of absence two weeks ago due to her depression  and anxiety. She is not involved in a relationship. She has not had a  boyfriend since she was in middle school. She is heterosexual.  She may  have a few drinks twice a month. She does not drink excessively. No  illicit drug use. Urine tox screen is negative. She does not smoke or  vape nicotine. No legal trouble.   She increase escitalopram.  Add  Vistaril and trazodone. Consider buspirone. 4.  Risks and benefits of psychotropics discussed as well as alternative  treatment. 5.  Support and reassurance given. 6.  Milieu and group therapy to develop insight to psychiatric illness  and better coping mechanism. 7.  Upon discharge, she will be referred for outpatient management. PATIENT'S STRENGTH:  Her parents and two best friends.         Earnestine Mcclellan M.D.    D: 01/10/2022 10:37:50       T: 01/10/2022 11:57:22     [email or web address]  Job#: [id number]     Doc#: [id number]    CC:

## 2022-01-10 NOTE — BH NOTE
PLAN OF CARE:     Start Time: 0900  End Time:  0930    Group Topic:  Daily Goals    Group Type:   Goal Group    Intervention/Goal:  To increase support and identify daily goals    Attendance:  Attended and participated    Affect:  Brightens with interaction    Behavior:   Pleasant and cooperative    Response:  appropriate    Daily Goal: \"To start learning to cope with anxiety. Go to groups. \"    Progress:  Progressing to goal

## 2022-01-10 NOTE — PROGRESS NOTES
[address] [facility]  Initial Interdisciplinary Treatment Plan NOTE    Review Date & Time: 01/10/22 0920    Patient was in treatment team.  See Multidisciplinary Treatment Team sheet for participants. Admission Type:    Voluntary    Reason for admission:   Suicidal ideation      Estimated Length of Stay Update:  01/12/22  Estimated Discharge Date Update: 3-5 days    PATIENT STRENGTHS:  Patient Strengths Strengths: Positive Support,Communication,Medication Compliance  Patient Strengths and Limitations:   Addictive Behavior:Addictive Behavior  In the past 3 months, have you felt or has someone told you that you have a problem with:  : None  Do you have a history of Chemical Use?: No  Do you have a history of Alcohol Use?: No  Do you have a history of Street Drug Abuse?: No  Histroy of Prescripton Drug Abuse?: No  Medical Problems:  Past Medical History:   Diagnosis Date    Eczema        EDUCATION:   Learner Progress Toward Treatment Goals: Reviewed results and recommendations of this team, Reviewed group plan and strategies, Reviewed signs, symptoms and risk of self harm and violent behavior and Reviewed goals and plan of care    Method: Individual    Outcome: Verbalized understanding and Demonstrated Understanding    PATIENT GOALS: daily    PLAN/TREATMENT RECOMMENDATIONS UPDATE:   1. What is the most important thing we can help you with while you are here? To decrease anxiety  2. Who is your support system? Good family support, friends  3. Do you have follow-up providers? PCP. Would like therapy  4. Do you have the ability to pay for your medications? BCBS  5. Where will you be residing when you leave the hospital? Home with parents  6. Will need a return to work slip or FMLA paper completion?  Yes      GOALS UPDATE:   Time frame for Short-Term Goals: Daily    Mollie Duverney, LSW

## 2022-01-10 NOTE — PLAN OF CARE
Patient has attended one group so far today and has been out on the unit but she now has to quarantine in her room due to being COVID+.

## 2022-01-10 NOTE — BH NOTE
Alert and oriented x 4. Speech clear. Pleasant and cooperative with care and unit routine. Denies current suicidal thoughts or plans. Feels anxious and upset that suicide was something she had considered. Denies homicidal thoughts or plans. Denies hallucinations or delusions. Tearful at times. Depressed and anxious. Sat on the fringe from peers and watched TV this evening. States that it was a very stressful day today.

## 2022-01-10 NOTE — CONSULTS
Consult      Patient:  Bart Gaxiola  YOB: 2000    MRN: [id number]     Acct: [de-identified]    Primary Care Physician: Nereida Chiang MD        Assessment and Plan:    1. COVID-19 infection: Minimal symptoms, not hypoxemic. Low-grade temp. Recommend as needed Tylenol and supportive care. Patient is not a candidate for antiviral or steroid treatment at this time. Discussed with staff, looking for COVID psych placement. 2. Depression: defer to primary service    HISTORY OF PRESENT ILLNESS:   History obtained from the patient. The patient is a 24 y.o. female whom I have been requested to see by Dr. Samantha Li for evaluation of COVID positive. Patient reports having a headache. She denies any cough, SOB, nausea, vomiting and diarrhea. She denies any fever or chills but was reported to have a temperature of 100.3 and was given tylenol for this. Patient is being treated for Depression and suicidal ideation. Past Medical History:        Diagnosis Date    Eczema        Past Surgical History:        Procedure Laterality Date    WISDOM TOOTH EXTRACTION         Medications:    No current facility-administered medications on file prior to encounter. Current Outpatient Medications on File Prior to Encounter   Medication Sig Dispense Refill    escitalopram (LEXAPRO) 10 MG tablet Take 1 tablet by mouth daily (Patient taking differently: Take 10 mg by mouth nightly ) 30 tablet 5    clonazePAM (KLONOPIN) 0.5 MG tablet 0.25-0.5mg bid prn (Patient taking differently: 0.25-0.5mg bid prn  Pt taking daily at 10 am) 60 tablet 0    DUPIXENT 300 MG/2ML SOSY injection Inject 300 mg into the skin every 14 days          Allergies:  Hydroxyzine hcl    Social History:    reports that she has never smoked. She has never used smokeless tobacco. She reports previous drug use.       Family History:       Problem Relation Age of Onset    Diabetes Mother     Asthma Paternal Grandmother     Cancer Paternal Grandfather     Asthma Paternal Grandfather     Depression Father     Allergy (Severe) Father     Colon Cancer Paternal Aunt     Cancer Paternal Aunt         Ovarian    Depression Paternal Uncle        Review of systems:  Constitutional: no fever, no night sweats, no fatigue  Head: no headache, no head injury, no migranes. Eye: no blurring of vision, no double vision. Ears: no hearing difficulty, no tinnitus  Mouth/throat: no ulceration, dental caries, dysphagia  Lungs: no cough, no shortness of breath, no wheeze  CVS: no palpitation, no chest pain, no shortness of breath  GI: no abdominal pain, no nausea , no vomiting, no constipation  REGINALDO: no dysuria, frequency and urgency, no hematuria, no kidney stones  Musculoskeletal: no joint pain, swelling , stiffness  Endocrine: no polyuria, polydypsia, no cold or heat intolerence  Hematology: no anemia, no easy brusing or bleeding, no hx of clotting disorder  Dermatology: no skin rash, no eczema, no prurities,  Psychiatry: no depression, no anxiety,no panic attacks, no suicide ideation  Neurology: headache               PHYSICAL EXAM:  BP (!) 142/81   Pulse 105   Temp 98.1 °F (36.7 °C)   Resp 18   Ht 5' 8\" (1.727 m)   Wt (!) 325 lb (147.4 kg)   LMP 12/14/2021 (Approximate)   SpO2 99%   BMI 49.42 kg/m²   General:  Awake, alert, not in distress. Appears to be stated age. HEENT: Atraumatic, normocephalic. PERRLA. EOM intact. Pink conjunctiva, anicteric sclera. Pink and moist oral mucosa. No lymphadenopathy. Trachea midline. Thyroid non palpable. Neck supple. No carotid bruit. No JVD. Chest: Bilateal air entry, Clear to auscultation, no wheezing, rhonchi or rales. Cardiovascular: RRR, Y9U6, no murmur, click, rub or gallop. No lower extremity edema. Pedal and posterior tibialis 2+. Abdomen: Soft, non tender to palpation. Active bowel sounds x 4 quadrants. Musculoskeletal: passive and active ROM x 4 extremities. Integumentary: Pink, warm and dry.  Free from rash or lesions. CNS: Oriented to person, place and time. Cranial nerves 2-12 grossly intact. Speech clear. Face symmetrical. No tremor. Review of Labs and Diagnostic Testing:  CBC:   Recent Labs     01/09/22  1600   WBC 8.5   HGB 14.9        BMP:    Recent Labs     01/09/22  1600      K 4.3      CO2 26   BUN 8   CREATININE 0.8   GLUCOSE 119*     Calcium:  Recent Labs     01/09/22  1600   CALCIUM 9.3     Hepatic:   Recent Labs     01/09/22  1600   ALKPHOS 140*   ALT 28   AST 22   PROT 8.4*   BILITOT 0.2*   LABALBU 4.6     Troponin:   Recent Labs     01/09/22  1600   TROPONINT < 0.010       Principal Problem:    MDD (major depressive disorder), single episode, severe , no psychosis ([facility] [address]  Active Problems:    Panic disorder without agoraphobia  Resolved Problems:    * No resolved hospital problems. *        Thank you Tevin Whitlock for allowing me to participate in this patient's care.       Electronically signed by Minal Rodriguez MD on 1/10/2022 at 2:18 PM    Consulting Hospitalist

## 2022-01-10 NOTE — PROGRESS NOTES
This RN has reviewed and agrees with ANABELLE Ireland LPN's data collection and has collaborated with this LPN regarding the patient's care plan.

## 2022-01-10 NOTE — BH NOTE
Group Therapy Note    Date: 1/9/2022  Start Time: 2000  End Time:  2020    Type of Group: Relaxation    Status After Intervention:  Unchanged    Participation Level:  Active Listener    Participation Quality: Attentive      Speech:  hesitant      Thought Process/Content: Linear      Affective Functioning: Flat      Mood: anxious      Level of consciousness:  Alert      Response to Learning: Able to verbalize current knowledge/experience      Endings: None Reported    Modes of Intervention: Support      Discipline Responsible: Registered Nurse      Signature:  Pernell Maria RN

## 2022-01-10 NOTE — BH NOTE
INPATIENT RECREATIONAL THERAPY  ADULT BEHAVIORAL SERVICES  EVALUATION    REFERRING PHYSICIAN:   Dr. Rafiq Davila  DIAGNOSIS:    Major Depressive Disorder, Single Episode, Severe, No Psychosis  PRECAUTIONS:   Standard precautions/COVID+    HISTORY OF PRESENT ILLNESS/INJURY:   Patient was admitted to the unit due to suicidal ideation and depression. Patient stated that she was having thoughts of taking an overdose prior to admission. Patient reported depression due to the recent death of her grandmother and stress related to her aunt being sick. Patient also reported that her uncle committed suicide 2 weeks ago. Patient stated that she also has anxiety and had to take a leave of absence from working at [employer] due to her anxiety. Patient also reported that she had to stop going to college due to her severe anxiety and poor sleep. Patient was pleasant and cooperative. Patient is COVID+. PMH:  Please see medical chart for prior medical history, allergies, and medication    HISTORY OF PSYCHIATRIC TREATMENT:  OP:  PCP    DATE OF BIRTH:   [birth year]  GENDER:   female  MARITAL STATUS:  single  EMPLOYMENT STATUS:  Unemployed - recently took an JULIET from working at [employer] due to her anxiety. LIVING SITUATION/SUPPORT:  Lives with her parents and siblings. Parents and friends are supportive. EDUCATIONAL LEVEL:  Some college - patient is a student at [employer] - skipping this semester due to her anxiety. MEDICATION/DRUG USE:  Compliant with medications. Alcohol use. LEISURE INTERESTS:   Coloring, arts/crafts, family activities, listening to music, spiritual activities, watching TV/Movies, reading, computer activities, activities with friends  ACTIVITY PREFERENCE:  No preference but patient should not attend groups at this time due to being COVID+. ACTIVITY TYPES:   Passive. Indoor. Outdoor. Active.    COGNITION:   A&Ox4    COPING:   poor  ATTENTION:  fair  RELAXATION:  Patient reported anxiety and poor sleep. SELF-ESTEEM:   poor  MOTIVATION:   Poor - minimal insight    SOCIAL SKILLS:   Fair - patient anxious and has to isolate in room due to being COVID+. FRUSTRATION TOLERANCE:  fair  ATTENTION SEEKING:  None noted  COOPERATION:  Cooperative and pleasant  AFFECT:   Brightens with interaction  APPEARANCE:   appropriate    HEARING:   No problems noted  VISION:    No problems noted   VERBAL COMMUNICATION:   No problems noted  WRITTEN COMMUNICATION:   No problems noted    COORDINATION:  No problems noted  MOBILITY:  Ambulates independently     GOALS:   Identify 2 new positive coping skills by time of discharge.

## 2022-01-10 NOTE — PROGRESS NOTES
[facility] is unable to find placement at present time, unable to connect with doctors at [facility].

## 2022-01-11 VITALS
BODY MASS INDEX: 44.41 KG/M2 | HEIGHT: 68 IN | OXYGEN SATURATION: 98 % | WEIGHT: 293 LBS | RESPIRATION RATE: 18 BRPM | HEART RATE: 111 BPM | SYSTOLIC BLOOD PRESSURE: 138 MMHG | TEMPERATURE: 97 F | DIASTOLIC BLOOD PRESSURE: 93 MMHG

## 2022-01-11 PROCEDURE — 5130000000 HC BRIDGE APPOINTMENT

## 2022-01-11 PROCEDURE — 6370000000 HC RX 637 (ALT 250 FOR IP): Performed by: PSYCHIATRY & NEUROLOGY

## 2022-01-11 RX ORDER — HYDROXYZINE PAMOATE 50 MG/1
50 CAPSULE ORAL 4 TIMES DAILY PRN
Qty: 100 CAPSULE | Refills: 0 | Status: SHIPPED | OUTPATIENT
Start: 2022-01-11 | End: 2022-02-10

## 2022-01-11 RX ORDER — ESCITALOPRAM OXALATE 20 MG/1
20 TABLET ORAL NIGHTLY
Qty: 30 TABLET | Refills: 0 | Status: SHIPPED | OUTPATIENT
Start: 2022-01-11 | End: 2022-06-28 | Stop reason: SDUPTHER

## 2022-01-11 RX ORDER — TRAZODONE HYDROCHLORIDE 50 MG/1
50 TABLET ORAL NIGHTLY PRN
Qty: 30 TABLET | Refills: 0 | Status: SHIPPED | OUTPATIENT
Start: 2022-01-11 | End: 2022-06-28 | Stop reason: ALTCHOICE

## 2022-01-11 RX ADMIN — HYDROXYZINE PAMOATE 50 MG: 50 CAPSULE ORAL at 09:24

## 2022-01-11 ASSESSMENT — PAIN DESCRIPTION - PROGRESSION: CLINICAL_PROGRESSION: NOT CHANGED

## 2022-01-11 ASSESSMENT — PAIN SCALES - GENERAL
PAINLEVEL_OUTOF10: 0
PAINLEVEL_OUTOF10: 4

## 2022-01-11 ASSESSMENT — PAIN DESCRIPTION - LOCATION: LOCATION: HEAD

## 2022-01-11 ASSESSMENT — PAIN DESCRIPTION - FREQUENCY: FREQUENCY: INTERMITTENT

## 2022-01-11 ASSESSMENT — PAIN DESCRIPTION - PAIN TYPE: TYPE: ACUTE PAIN

## 2022-01-11 ASSESSMENT — PAIN DESCRIPTION - ONSET: ONSET: GRADUAL

## 2022-01-11 ASSESSMENT — PAIN DESCRIPTION - DESCRIPTORS: DESCRIPTORS: HEADACHE

## 2022-01-11 ASSESSMENT — PAIN - FUNCTIONAL ASSESSMENT: PAIN_FUNCTIONAL_ASSESSMENT: ACTIVITIES ARE NOT PREVENTED

## 2022-01-11 NOTE — PROGRESS NOTES
2140: Notified Dr. Lucille Moore that [facility] accepted patient to be transferred tonight. Dr. Lucille Moore said \"no, he wanted the patient to stay here. \" Dr. Lucille Moore also updated that patient is complaining of shortness of breath and the hospitalist currently ordered a chest x ray. House supervisor Wilfrid Knox also stated that she spoke with the previous house supervisor, Frannie Yi and was told that the patient is not to be moved off of our floor and is supposed to wear a mask and stay in her room.

## 2022-01-11 NOTE — PROGRESS NOTES
Edur went into patients room to assess patient . Upon taking the patients vitals writer discovered patients vitals were 140/76 130 16 100.3 . Writer continued to check on patient she reported having a headache . Writer then rechecked patients vitals she had still had a temperature of 100.3. Writer then decided to look into if she had Covid testing as an possible reason to her having her stated symptoms. Upon further investigation writer discovered that patient had not been tested in the Emergency Department before entry onto the unit. Edur then contacted Dr. Everton Bansal for and order to get patient tested , order was given patient was then tested. The test subsequently came back as a positive for Covid and Dr. Everton Bansal was notified along with charge nurse, nursing supervisor, house supervisor and unit Supervisor. Patient was then place in a condition of isolation room mate removed and placed into another room. While the issue was being reviewed as to further action to be taken. Immediate supervision began to look in to what action to take to ensure that the stated patient was placed in a more conducive situation for her diagnosis and a safer environment for the remaining  patients. Several situations were looked into to facilitate this patient being removed from this unit to no avail. Patient remained on the unit with isolation procedures in place to continue to protect the other patients on the unit.

## 2022-01-11 NOTE — DISCHARGE INSTR - DIET

## 2022-01-11 NOTE — PROGRESS NOTES
[facility]   Discharge Note    Pt discharged with followings belongings:       Valuables sent home with pt. Valuables retrieved from safe, Security envelope number:  N/A and returned to patient. Patient left department with staff via ambulatory. Discharged to private residence. \"An Important Message from [facility] About Your Rights\" form photocopy original from admission and provided to pt at discharge N/A. Patient education on aftercare instructions: YES  Reviewed Discharge Instructions with patient/family/nursing facility. Patient/family verbalizes understanding and agreement with the discharge plan using the teachback method. Patient/family verbalize understanding of AVS:  YES.     Status EXAM upon discharge:  Status and Exam  Normal: No  Facial Expression: Flat  Affect: Constricted  Level of Consciousness: Alert  Mood:Normal: No  Mood: Depressed,Anxious  Motor Activity:Normal: Yes  Interview Behavior: Cooperative  Preception: [place] to Person,[place] to Time,[place] to Place,[place] to Situation  Attention:Normal: Yes  Thought Processes: Circumstantial  Thought Content:Normal: Yes  Hallucinations: None  Delusions: No  Memory:Normal: Yes  Insight and Judgment: No  Insight and Judgment: Poor Judgment,Poor Insight  Present Suicidal Ideation: No  Present Homicidal Ideation: No    Haley Santiago RN

## 2022-01-11 NOTE — PROGRESS NOTES
2220: Patient off floor accompanied by campus police and Olivia Espana from [facility] for chest xray. Roxann at [facility] notified of discharge cancellation per Dr. Danni Ruelas.

## 2022-01-11 NOTE — BH NOTE
Patient was unable to attend the goal group/community meeting and the recreation group due to being COVID+.

## 2022-01-11 NOTE — PROGRESS NOTES
Pt found crying in her room, pt given  and support. Also given handouts on rumination. Pt filled out Safety Plan.

## 2022-01-11 NOTE — PROGRESS NOTES
This RN has reviewed and agrees with HASMUKH Ugarte LPN's data collection and has collaborated with this LPN regarding the patient's care.

## 2022-01-11 NOTE — DISCHARGE SUMMARY
Physician Discharge Summary     Patient ID:  Emery Plasencia  [id number]  50 y.o.  2000    Admit date: 2022    Discharge date and time: 2022  10:37 AM     Admitting Physician: Zhao Sexton MD     Discharge Physician: Soumya Egan MD      Admission Diagnoses: Suicidal ideation [R45.851]  MDD (major depressive disorder), recurrent episode ([facility] [F33.9]  MDD (major depressive disorder), single episode, severe , no psychosis ([facility] [F32.2]    IDENTIFYING INFORMATION: The patient is a 18-year-old single   female. She has no children. She lives with her parents and two of her  brothers. She works part time at [employer]. She is also a full-time  student at [employer]. HISTORY OF PRESENT ILLNESS: The patient presented to [address] [place] [address] ED with her mother and one of her brothers due to an interrupted  suicide attempt. She was going to take an overdose of her medication  and one of her brothers walked on her. She reports she has been feeling  depressed and anxious for about four weeks since her paternal  grandmother . She was very close to her paternal grandmother. Moreover her maternal uncle committed suicide two weeks ago. She went  to the hospital with her mother and saw her  uncle. She cannot  get that picture off her mind. There is no trigger about it, but she  feels that she is having some type of flashback. She has trouble  getting and staying asleep. She feels helpless. She has poor energy,  poor concentration, psychomotor retardation and she has been having  suicidal thoughts for about three days. She denies hypomanic or manic  symptoms. No psychotic symptoms. She has a history of mild anxiety  since she was in grade school, but for the past four weeks, anxiety  symptoms have been very debilitating. She feels anxious most of the  time.   She reports anxiety attack associated with chest pressure,  shortness of breath, feeling shaky, sweaty, feeling sick to her stomach,  feeling she is going to pass out. Those symptoms will last for about 5  minutes. There is no trigger for them. She denies obsessive-compulsive  disorder symptoms. No eating disorder symptoms. No history of abuse. MENTAL STATUS EXAMINATION AT ADMISSION: See H and P. Discharge Diagnoses:   MDD (major depressive disorder), single episode, severe , no psychosis ([facility]     Past Medical History:   Diagnosis Date    Eczema         Admission Condition: poor    Discharged Condition: stable    Indication for Admission: threat to self    Significant Diagnostic Studies:   See Results Review tab in EHR    CBC:   Recent Labs     01/09/22  1600   WBC 8.5   HGB 14.9        BMP:    Recent Labs     01/09/22  1600      K 4.3      CO2 26   BUN 8   CREATININE 0.8   GLUCOSE 119*     Hepatic:   Recent Labs     01/09/22  1600   AST 22   ALT 28   BILITOT 0.2*   ALKPHOS 140*       TREATMENT AND CLINICAL COURSE:   Patient was admitted on the unit. Routine lab was ordered. Physical examination was within normal limits. At admission, I discontinued clonazepam, resumed and increased escitalopram I added Vistaril and trazodone since reportedly patient was feeling more anxious with hydroxyzine. Patient did not have side effect from medications. Unfortunately, patient was having upper respiratory symptoms and was tested positive for Covid-19. She was in quarantine and did not like it. I was considering transfer to another behavioral unit that accepts Covid-19 positive patient. But patient was no longer having suicidal thoughts and her mother was willing to watch the patient at home. She was discharged in fair condition with outpatient follow-up in my private office in a few weeks. Patient and her mother are aware to bring patient back to the emergency room if symptoms worsen. Patient has no history of illicit drugs or alcohol.     Consults: Hospitalist    Treatments: Psychotropic medications, therapy with group, milieu, and 1:1 with nurses, social workers and Attending physician. Discharge Medications:  Current Discharge Medication List      START taking these medications    Details   hydrOXYzine (VISTARIL) 50 MG capsule Take 1 capsule by mouth 4 times daily as needed for Anxiety  Qty: 100 capsule, Refills: 0      traZODone (DESYREL) 50 MG tablet Take 1 tablet by mouth nightly as needed for Sleep  Qty: 30 tablet, Refills: 0         CONTINUE these medications which have CHANGED    Details   escitalopram (LEXAPRO) 20 MG tablet Take 1 tablet by mouth nightly  Qty: 30 tablet, Refills: 0         CONTINUE these medications which have NOT CHANGED    Details   DUPIXENT 300 MG/2ML SOSY injection Inject 300 mg into the skin every 14 days          STOP taking these medications       clonazePAM (KLONOPIN) 0.5 MG tablet Comments:   Reason for Stopping:                MENTAL STATUS EXAMINATION AT DISCHARGE: Patient is cooperative. Speech: Normal rate and tone. No abnormal movements, tics or mannerisms. Mood anxious and sad; affect Restricted. Suicidal ideation Absent. Homicidal ideations Absent. Hallucinations Absent. Delusions Absent. Thought Content: normal. Thought Processes: Goal-Directed. Alert and oriented X 3. Attention and concentration fair. MEMORY intact. Insight and Judgement fair. Disposition: Home    Patient Instructions: Activity: As tolerated  Diet: regular diet    Follow-up as scheduled with Dr. Sloan Benoit and therapist     Time Spent on discharge in examination, evaluation, counseling and review of medications and discharge plan: More than 30 minutes. Engagement: Patient displayed a good level of engagement with the treatments offered during this admission.        Signed:  Electronically signed by Soumya Egan MD on 1/11/2022 at 10:37 AM

## 2022-01-11 NOTE — PROGRESS NOTES
Daily Progress Note  Emily Ji MD  1/11/2022    Reviewed patient's current plan of care and vital signs with nursing staff. Sleep:  7.5 hours last night  Attending groups: No: In Quarantine due to Covid  No reported Suicidal thought; No interaction with peers & staff; Mood was tired. Patient is tearful since she is not able to participate in group and milieu therapy. She is incontinent since she was tested positive for COVID. I talked to patient's mother who is willing to supervise patient if she were to be discharged. SUBJECTIVE:    Patient is feeling slightly better. SUICIDAL IDEATION denies suicidal ideation. Patient does not have medication side effects. ROS: Patient has new complaints:  No  Sleeping adequately:  Yes  Visitors: No    Mental Status Examination:  Patient is cooperative. Speech: Normal rate and tone. No abnormal movements, tics or mannerisms. Mood anxious and sad; affect Restricted. Suicidal ideation Absent. Homicidal ideations Absent. Hallucinations Absent. Delusions Absent. Thought Content: normal. Thought Processes: Goal-Directed. Alert and oriented X 3. Attention and concentration fair. MEMORY intact. Insight and Judgement fair. Data   height is 5' 8\" (1.727 m) and weight is 325 lb (147.4 kg) (abnormal). Her oral temperature is 97 °F (36.1 °C). Her blood pressure is 138/93 (abnormal) and her pulse is 111. Her respiration is 18 and oxygen saturation is 98%.    Labs:   Admission on 01/09/2022   Component Date Value Ref Range Status    WBC 01/09/2022 8.5  4.8 - 10.8 thou/mm3 Final    RBC 01/09/2022 5.49* 4.20 - 5.40 mill/mm3 Final    Hemoglobin 01/09/2022 14.9  12.0 - 16.0 gm/dl Final    Hematocrit 01/09/2022 46.9  37.0 - 47.0 % Final    MCV 01/09/2022 85.4  81.0 - 99.0 fL Final    MCH 01/09/2022 27.1  26.0 - 33.0 pg Final    MCHC 01/09/2022 31.8* 32.2 - 35.5 gm/dl Final    RDW-CV 01/09/2022 13.1  11.5 - 14.5 % Final    RDW-SD 01/09/2022 40.4  35.0 - 45.0 fL Final  Platelets 30/42/0931 234  130 - 400 thou/mm3 Final    MPV 01/09/2022 9.5  9.4 - 12.4 fL Final    Seg Neutrophils 01/09/2022 81.7  % Final    Lymphocytes 01/09/2022 10.6  % Final    Monocytes 01/09/2022 6.3  % Final    Eosinophils 01/09/2022 0.8  % Final    Basophils 01/09/2022 0.4  % Final    Immature Granulocytes 01/09/2022 0.2  % Final    Segs Absolute 01/09/2022 6.9  1.8 - 7.7 thou/mm3 Final    Lymphocytes Absolute 01/09/2022 0.9* 1.0 - 4.8 thou/mm3 Final    Monocytes Absolute 01/09/2022 0.5  0.4 - 1.3 thou/mm3 Final    Eosinophils Absolute 01/09/2022 0.1  0.0 - 0.4 thou/mm3 Final    Basophils Absolute 01/09/2022 0.0  0.0 - 0.1 thou/mm3 Final    Immature Grans (Abs) 01/09/2022 0.02  0.00 - 0.07 thou/mm3 Final    nRBC 01/09/2022 0  /100 wbc Final    Performed at [address] [place] [address], [address]    Glucose 01/09/2022 119* 70 - 108 mg/dL Final    CREATININE 01/09/2022 0.8  0.4 - 1.2 mg/dL Final    BUN 01/09/2022 8  7 - 22 mg/dL Final    Sodium 01/09/2022 139  135 - 145 meq/L Final    Potassium reflex Magnesium 01/09/2022 4.3  3.5 - 5.2 meq/L Final    Chloride 01/09/2022 103  98 - 111 meq/L Final    CO2 01/09/2022 26  23 - 33 meq/L Final    Calcium 01/09/2022 9.3  8.5 - 10.5 mg/dL Final    AST 01/09/2022 22  5 - 40 U/L Final    Alkaline Phosphatase 01/09/2022 140* 38 - 126 U/L Final    Total Protein 01/09/2022 8.4* 6.1 - 8.0 g/dL Final    Albumin 01/09/2022 4.6  3.5 - 5.1 g/dL Final    Total Bilirubin 01/09/2022 0.2* 0.3 - 1.2 mg/dL Final    ALT 01/09/2022 28  11 - 66 U/L Final    Performed at [address] [place] [address], [address]    Troponin T 01/09/2022 < 0.010  ng/ml Final    Comment: <0.010 ng/ml        Normal  > or = 0.010 ng/ml  Elevated   (99%)                      Consistent with myocardial damage    Cardiac troponin values can be elevated by many disease states in addition  to acute ischemia.  These include, but are not limited to: chronic renal  failure, CHF, CVA, pulmonary embolus, COPD, myocardial trauma/surgery,  myocarditis, pericarditis, tachycardia, aortic dissection, amyloidosis,  sepsis and strenuous exercise. Serial measurement of troponin is strongly  recommended as a first step in determining whether a low level elevation  represents an acute or chronic condition.   Performed at [address] [place] [address], [address], Serum 01/09/2022 NEGATIVE  NEGATIVE Final    Performed at [address] [place] [address], [address]    Acetaminophen Level 01/09/2022 < 5.0  0.0 - 20.0 ug/mL Final    Performed at [address] [place] [address], [address]    Salicylate, Serum 54/43/3764 < 0.3* 2.0 - 10.0 mg/dL Final    Performed at [address] [place] [address], [address], SERUM 01/09/2022 < 0.01  0.00 % Final    Performed at [address] [place] [address], [address]    TSH 01/09/2022 0.675  0.400 - 4.200 uIU/mL Final    Performed at [address] [place] [address], [address]    Anion Gap 01/09/2022 10.0  8.0 - 16.0 meq/L Final    Comment: ANION GAP = Sodium -(Chloride + CO2)  Performed at [address] [place] [address], [address]      Osmolality Calc 01/09/2022 277.0  275.0 - 300.0 mOsmol/kg Final    Performed at [address] [place] [address], [address]    Est, Glom Filt Rate 01/09/2022 89* ml/min/1.73m2 Final    Comment: Stage Description                    GFR, ml/min/1.73 m2   -   At increased risk               > or = 60 (with chronic                                       kidney disease risk factors)   1   Normal or increased GFR         > or = 90   2   Mildly or decreased GFR         60 - 89   3   Moderately decreased GFR        30 - 59   4   Severely decreased GFR          15 - 29   5   Kidney failure                  <15 (or dialysis)  Estimated GFR calculated using abbreviated MDRD formula as  recommended by [employer]. Calculation based  upon serum creatinine and adjusted for age, gender & race. [place]. Internal Med., Vol. 139 (2) pg 137-147. Performed at [address] [place] [address], [address]      AMPHETAMINE+METHAMPHETAMINE URINE * 01/09/2022 Negative  NEGATIVE Final    Barbiturate Quant, Ur 01/09/2022 Negative  NEGATIVE Final    Benzodiazepine Quant, Ur 01/09/2022 Negative  NEGATIVE Final    Cannabinoid Quant, Ur 01/09/2022 Negative  NEGATIVE Final    Cocaine Metab Quant, Ur 01/09/2022 Negative  NEGATIVE Final    Opiates, Urine 01/09/2022 Negative  NEGATIVE Final    Oxycodone 01/09/2022 Negative  NEGATIVE Final    PCP Quant, Ur 01/09/2022 Negative  NEGATIVE Final    Comment: A \"Negative\" result for a drug abuse screen test indicates     that the drug concentration is below the following cutoffs:            Amphetamine/Methamphetamine     1000 ng/ml            Barbiturate                      200 ng/ml            Benzodiazapine                   200 ng/ml            Cannabinoids                      50 ng/ml            Cocaine Metabolite               300 ng/ml            Opiates                          300 ng/ml            Oxycodone                        100 ng/ml            Phencyclidine                     25 ng/ml  A \"Positive\" result for a drug abuse screen test should be     considered presumptive positive until/unless confirmed     by another method. (Additional request)  Quantitative values from a reference laboratory are     available upon additional request.  These results are for medical use only.   Performed at [address] [place] [address], [address]      T4 Free 01/09/2022 1.34  0.93 - 1.76 ng/dL Final    Performed at [address] [place] [address], [address]    SARS-CoV-2 RNA, RT PCR 01/10/2022 DETECTED* NOT DETECTED Final    Comment: Detected results are indicative of the presence of SARS-CoV-2,  clinical correlation with patient history and other diagnostic  information is necessary to determine patient infection status. A Detected results do not rule out bacterial infection or  co-infection with other pathogens. Testing was performed using [employer] SARS-CoV-2 and Influenza A/B nucleic  acid assay. This test is a multiplex Real-Time Reverse Transcriptase  Polymerase Chain Reaction (RT-PCR)-based in vitro diagnostic test intended  for the qualitative detection of nucleic acids from SARS-CoV-2, influenza A,  and influenza B in nasopharyngeal and nasal swab specimens for use under the  FDAs Emergency Use Authorization (EUA) only. Fact sheet for Healthcare Providers:  ResellerReport.com.cy  Fact sheet for Patients: Tempered Mind.cy      INFLUENZA A 01/10/2022 NOT DETECTED  NOT DETECTED Final    Comment: Note:  Influenza A and Influenza B negative results should be considered  presumptive in samples that have a Detected SARS-CoV-2 result. Consider  re-testing with an alternate FDA-approved test for Flu A & B if clinically  indicated.  INFLUENZA B 01/10/2022 NOT DETECTED  NOT DETECTED Final    Comment: Note:  Influenza A and Influenza B negative results should be considered  presumptive in samples that have a Detected SARS-CoV-2 result. Consider  re-testing with an alternate FDA-approved test for Flu A & B if clinically  indicated.   Performed at [address] [place] [address], [facility]              Medications  Current Facility-Administered Medications: escitalopram (LEXAPRO) tablet 20 mg, 20 mg, Oral, Nightly  albuterol sulfate  (90 Base) MCG/ACT inhaler 2 puff, 2 puff, Inhalation, Q4H PRN  acetaminophen (TYLENOL) tablet 650 mg, 650 mg, Oral, Q4H PRN  ibuprofen (ADVIL;MOTRIN) tablet 400 mg, 400 mg, Oral, Q6H PRN  polyethylene glycol (GLYCOLAX) packet 17 g, 17 g, Oral, Daily PRN  traZODone (DESYREL) tablet 50 mg, 50 mg, Oral, Nightly PRN  aluminum & magnesium hydroxide-simethicone (MAALOX) 200-200-20 MG/5ML suspension 30 mL, 30 mL, Oral, Q6H PRN  hydrOXYzine (VISTARIL) capsule 50 mg, 50 mg, Oral, 4x Daily PRN    ASSESSMENT  MDD (major depressive disorder), single episode, severe , no psychosis ([facility]     PLAN  Patient's symptoms are improving  Continue with current medications. Attempt to develop insight  Psycho-education conducted. Supportive Therapy conducted.   Probable discharge is today due to quarantine and Covid-19 positive  Follow-up with Dr. Wilfredo Bell and therapist

## 2022-01-12 ENCOUNTER — TELEPHONE (OUTPATIENT)
Dept: PSYCHIATRY | Age: 22
End: 2022-01-12

## 2022-01-12 NOTE — TELEPHONE ENCOUNTER
Post hospital discharge call completed. Phone numbers provided were mothers. I did speak with Laura's mothers. They understood the discharge plans. She stated Sushma Dennis was sleeping a lot and recovering, but doing better.

## 2022-02-02 ENCOUNTER — OFFICE VISIT (OUTPATIENT)
Dept: FAMILY MEDICINE CLINIC | Age: 22
End: 2022-02-02

## 2022-02-02 VITALS
HEART RATE: 110 BPM | DIASTOLIC BLOOD PRESSURE: 82 MMHG | OXYGEN SATURATION: 98 % | BODY MASS INDEX: 44.41 KG/M2 | HEIGHT: 68 IN | TEMPERATURE: 98.8 F | SYSTOLIC BLOOD PRESSURE: 132 MMHG | WEIGHT: 293 LBS

## 2022-02-02 DIAGNOSIS — F32.2 MDD (MAJOR DEPRESSIVE DISORDER), SINGLE EPISODE, SEVERE , NO PSYCHOSIS (HCC): Primary | ICD-10-CM

## 2022-02-02 PROCEDURE — 1111F DSCHRG MED/CURRENT MED MERGE: CPT | Performed by: FAMILY MEDICINE

## 2022-02-02 PROCEDURE — 99214 OFFICE O/P EST MOD 30 MIN: CPT | Performed by: FAMILY MEDICINE

## 2022-02-03 NOTE — PROGRESS NOTES
Post-Discharge Transitional Care Management Services or Hospital Follow Up      Shane Aguayo   YOB: 2000    Date of Office Visit:  2/2/2022  Date of Hospital Admission: 1/9/22  Date of Hospital Discharge: 1/11/22  Risk of hospital readmission (high >=14%. Medium >=10%) :Readmission Risk Score: 7.9 ( )      Care management risk score Rising risk (score 2-5) and Complex Care (Scores >=6): 0     Non face to face  following discharge, date last encounter closed (first attempt may have been earlier): *No documented post hospital discharge outreach found in the last 14 days    Call initiated 2 business days of discharge: *No response recorded in the last 14 days    Patient Active Problem List   Diagnosis    Eczema    MDD (major depressive disorder), single episode, severe , no psychosis ([facility]    Panic disorder without agoraphobia    Depression with suicidal ideation       Allergies   Allergen Reactions    Hydroxyzine Hcl Other (See Comments)     Panic attack 1 time, pt tolerates otherwise       Medications listed as ordered at the time of discharge from hospital     Medication List          Accurate as of February 2, 2022 11:59 PM. If you have any questions, ask your nurse or doctor.             CONTINUE taking these medications    Dupixent 300 MG/2ML Sosy injection  Generic drug: dupilumab     escitalopram 20 MG tablet  Commonly known as: LEXAPRO  Take 1 tablet by mouth nightly     hydrOXYzine 50 MG capsule  Commonly known as: VISTARIL  Take 1 capsule by mouth 4 times daily as needed for Anxiety     traZODone 50 MG tablet  Commonly known as: DESYREL  Take 1 tablet by mouth nightly as needed for Sleep              Medications marked \"taking\" at this time  No outpatient medications have been marked as taking for the 2/2/22 encounter (Office Visit) with Oscar Myles MD.        Medications patient taking as of now reconciled against medications ordered at time of hospital discharge: Yes    Chief Complaint   Patient presents with   [address] from [facility] 1/9; suicidal idealization        History of Present illness - Follow up of Hospital diagnosis(es):    Diagnosis Orders   1. MDD (major depressive disorder), single episode, severe , no psychosis ([address]           Inpatient course: Discharge summary reviewed- see chart. Interval history/Current status: stable    A comprehensive review of systems was negative except for what was noted in the HPI. Vitals:    02/02/22 1242   BP: 132/82   Pulse: 110   Temp: 98.8 °F (37.1 °C)   TempSrc: Oral   SpO2: 98%   Weight: (!) 318 lb (144.2 kg)   Height: 5' 8\" (1.727 m)     Body mass index is 48.35 kg/m².    Wt Readings from Last 3 Encounters:   02/02/22 (!) 318 lb (144.2 kg)   02/02/22 (!) 318 lb 11.2 oz (144.6 kg)   01/09/22 (!) 325 lb (147.4 kg)     BP Readings from Last 3 Encounters:   02/02/22 132/82   02/02/22 132/82   01/11/22 (!) 138/93        Physical Exam:  General Appearance: alert and oriented to person, place and time, well developed and well- nourished, in no acute distress  Skin: warm and dry, no rash or erythema  Head: normocephalic and atraumatic  Eyes: pupils equal, round, and reactive to light, extraocular eye movements intact, conjunctivae normal  ENT: tympanic membrane, external ear and ear canal normal bilaterally, nose without deformity, nasal mucosa and turbinates normal without polyps  Neck: supple and non-tender without mass, no thyromegaly or thyroid nodules, no cervical lymphadenopathy  Pulmonary/Chest: clear to auscultation bilaterally- no wheezes, rales or rhonchi, normal air movement, no respiratory distress  Cardiovascular: normal rate, regular rhythm, normal S1 and S2, no murmurs, rubs, clicks, or gallops, distal pulses intact, no carotid bruits  Abdomen: soft, non-tender, non-distended, normal bowel sounds, no masses or organomegaly  Extremities: no cyanosis, clubbing or edema  Musculoskeletal: normal range of motion, no joint swelling, deformity or tenderness  Neurologic: reflexes normal and symmetric, no cranial nerve deficit, gait, coordination and speech normal    Assessment/Plan:   Diagnosis Orders   1. MDD (major depressive disorder), single episode, severe , no psychosis ([facility]             Medical Decision Making: moderate complexity    Counseled on med compliance, importance of regular follow up with her psychologist and psychiatrist, and safety joshua.

## 2022-06-28 ENCOUNTER — OFFICE VISIT (OUTPATIENT)
Dept: FAMILY MEDICINE CLINIC | Age: 22
End: 2022-06-28
Payer: COMMERCIAL

## 2022-06-28 VITALS
OXYGEN SATURATION: 98 % | BODY MASS INDEX: 44.41 KG/M2 | TEMPERATURE: 98.4 F | HEART RATE: 95 BPM | DIASTOLIC BLOOD PRESSURE: 66 MMHG | RESPIRATION RATE: 18 BRPM | WEIGHT: 293 LBS | SYSTOLIC BLOOD PRESSURE: 132 MMHG | HEIGHT: 68 IN

## 2022-06-28 DIAGNOSIS — R10.9 ABDOMINAL PAIN, UNSPECIFIED ABDOMINAL LOCATION: ICD-10-CM

## 2022-06-28 DIAGNOSIS — F32.2 MDD (MAJOR DEPRESSIVE DISORDER), SINGLE EPISODE, SEVERE , NO PSYCHOSIS (HCC): Primary | ICD-10-CM

## 2022-06-28 PROCEDURE — 99213 OFFICE O/P EST LOW 20 MIN: CPT | Performed by: FAMILY MEDICINE

## 2022-06-28 RX ORDER — ESCITALOPRAM OXALATE 20 MG/1
20 TABLET ORAL NIGHTLY
Qty: 90 TABLET | Refills: 1 | Status: SHIPPED | OUTPATIENT
Start: 2022-06-28

## 2022-06-28 ASSESSMENT — PATIENT HEALTH QUESTIONNAIRE - PHQ9
5. POOR APPETITE OR OVEREATING: 0
SUM OF ALL RESPONSES TO PHQ QUESTIONS 1-9: 1
4. FEELING TIRED OR HAVING LITTLE ENERGY: 0
SUM OF ALL RESPONSES TO PHQ9 QUESTIONS 1 & 2: 0
SUM OF ALL RESPONSES TO PHQ QUESTIONS 1-9: 1
7. TROUBLE CONCENTRATING ON THINGS, SUCH AS READING THE NEWSPAPER OR WATCHING TELEVISION: 0
9. THOUGHTS THAT YOU WOULD BE BETTER OFF DEAD, OR OF HURTING YOURSELF: 0
SUM OF ALL RESPONSES TO PHQ QUESTIONS 1-9: 1
1. LITTLE INTEREST OR PLEASURE IN DOING THINGS: 0
6. FEELING BAD ABOUT YOURSELF - OR THAT YOU ARE A FAILURE OR HAVE LET YOURSELF OR YOUR FAMILY DOWN: 0
10. IF YOU CHECKED OFF ANY PROBLEMS, HOW DIFFICULT HAVE THESE PROBLEMS MADE IT FOR YOU TO DO YOUR WORK, TAKE CARE OF THINGS AT HOME, OR GET ALONG WITH OTHER PEOPLE: 0
3. TROUBLE FALLING OR STAYING ASLEEP: 1
2. FEELING DOWN, DEPRESSED OR HOPELESS: 0
8. MOVING OR SPEAKING SO SLOWLY THAT OTHER PEOPLE COULD HAVE NOTICED. OR THE OPPOSITE, BEING SO FIGETY OR RESTLESS THAT YOU HAVE BEEN MOVING AROUND A LOT MORE THAN USUAL: 0
SUM OF ALL RESPONSES TO PHQ QUESTIONS 1-9: 1

## 2022-06-28 ASSESSMENT — ENCOUNTER SYMPTOMS
EYES NEGATIVE: 1
COUGH: 0
DIARRHEA: 0
SORE THROAT: 0
NAUSEA: 0
SHORTNESS OF BREATH: 0
CHEST TIGHTNESS: 0
ABDOMINAL PAIN: 1
VOMITING: 0
BACK PAIN: 0
RHINORRHEA: 0

## 2022-06-28 NOTE — PROGRESS NOTES
[address] [place] [address]  Dept: [phone]  Dept Fax: [phone]  Loc: [phone]  PROGRESS NOTE      VisitDate: 6/28/2022    Jana Agarwal is a 24 y.o. female who presents today for:     Chief Complaint   Patient presents with    Discuss Medications     saw psychiatrist, needs us to take over meds    Other     when she needs to have a bm gets a \"period like\" cramp everytime, denies diarrhea         Subjective:  HPI  Patient comes in for follow-up of major depression. She had been following with psychiatry but she is unable to afford those office visits. She is doing very well has felt stable for several months and needs refill on her Lexapro. She also reports having pain with every bowel movement. No blood no black stool no diarrhea. She gets sharp pains when she is having a bowel movement once she is finished pain resolves. Review of Systems   Constitutional: Negative for activity change, appetite change, fatigue and fever. HENT: Negative for congestion, rhinorrhea and sore throat. Eyes: Negative. Respiratory: Negative for cough, chest tightness and shortness of breath. Cardiovascular: Negative for chest pain and palpitations. Gastrointestinal: Positive for abdominal pain. Negative for diarrhea, nausea and vomiting. Genitourinary: Negative for dysuria and urgency. Musculoskeletal: Negative for arthralgias and back pain. Neurological: Negative for dizziness and headaches. Psychiatric/Behavioral: Negative for dysphoric mood. The patient is not nervous/anxious.       Past Medical History:   Diagnosis Date    Eczema       Past Surgical History:   Procedure Laterality Date    WISDOM TOOTH EXTRACTION       Family History   Problem Relation Age of Onset    Diabetes Mother     Asthma Paternal Grandmother     Cancer Paternal Grandfather     Asthma Paternal Grandfather     Depression Father  Allergy (Severe) Father     Colon Cancer Paternal Aunt     Cancer Paternal Aunt         Ovarian    Depression Paternal Uncle      Social History     Tobacco Use    Smoking status: Never Smoker    Smokeless tobacco: Never Used   Substance Use Topics    Alcohol use: Not on file      Current Outpatient Medications   Medication Sig Dispense Refill    escitalopram (LEXAPRO) 20 MG tablet Take 1 tablet by mouth nightly 90 tablet 1    DUPIXENT 300 MG/2ML SOSY injection Inject 300 mg into the skin every 14 days        No current facility-administered medications for this visit. Allergies   Allergen Reactions    Hydroxyzine Hcl Other (See Comments)     Panic attack 1 time, pt tolerates otherwise     Health Maintenance   Topic Date Due    HPV vaccine (3 - 2-dose series) 04/08/2011    HIV screen  Never done    Chlamydia screen  Never done    Hepatitis C screen  Never done    Pap smear  Never done    Flu vaccine (Season Ended) 12/28/2022 (Originally 9/1/2022)    Depression Monitoring  06/28/2023    DTaP/Tdap/Td vaccine (6 - Td or Tdap) 08/01/2027    Hepatitis B vaccine  Completed    Hib vaccine  Completed    Varicella vaccine  Completed    Meningococcal (ACWY) vaccine  Completed    COVID-19 Vaccine  Completed    Hepatitis A vaccine  Aged Out    Pneumococcal 0-64 years Vaccine  Aged Out         Objective:     Physical Exam  Constitutional:       General: She is not in acute distress. Appearance: She is well-developed. She is not diaphoretic. HENT:      Head: Normocephalic and atraumatic. Eyes:      General: No scleral icterus. Conjunctiva/sclera: Conjunctivae normal.   Neck:      Thyroid: No thyromegaly. Vascular: No JVD. Comments: No bruits  Cardiovascular:      Rate and Rhythm: Normal rate and regular rhythm. Heart sounds: Normal heart sounds. Pulmonary:      Effort: Pulmonary effort is normal. No respiratory distress. Breath sounds: Normal breath sounds.  No wheezing or rales. Abdominal:      Palpations: Abdomen is soft. There is no mass. Tenderness: There is no abdominal tenderness. There is no guarding. Musculoskeletal:         General: No tenderness. Skin:     General: Skin is warm and dry. Findings: No rash. Neurological:      Mental Status: She is alert and oriented to person, place, and time. Cranial Nerves: No cranial nerve deficit. /66 (Site: Right Upper Arm)   Pulse 95   Temp 98.4 °F (36.9 °C) (Oral)   Resp 18   Ht 5' 8.25\" (1.734 m)   Wt (!) 326 lb (147.9 kg)   LMP 06/07/2022   SpO2 98%   BMI 49.21 kg/m²       Impression/Plan:  1. MDD (major depressive disorder), single episode, severe , no psychosis ([facility]    2. Abdominal pain, unspecified abdominal location      Requested Prescriptions     Signed Prescriptions Disp Refills    escitalopram (LEXAPRO) 20 MG tablet 90 tablet 1     Sig: Take 1 tablet by mouth nightly     No orders of the defined types were placed in this encounter. Differential diagnosis for abdominal pain discussed with the patient. Recommend she add fiber to her diet stay hydrated if symptoms fail to improve would recommend possible endoscopy/colonoscopy with GI    Patient giveneducational materials - see patient instructions. Discussed use, benefit, and side effects of prescribed medications. All patient questions answered. Pt voiced understanding. Reviewed health maintenance. Patient agreedwith treatment plan. Follow up as directed. **This report has been created using voice recognition software. It may contain minor errorswhich are inherent in voice recognition technology. **       Electronically signed by Rosina Orr MD on 6/28/2022 at 6:53 PM

## 2022-07-05 ENCOUNTER — TELEPHONE (OUTPATIENT)
Dept: FAMILY MEDICINE CLINIC | Age: 22
End: 2022-07-05

## 2022-07-05 DIAGNOSIS — R10.9 ABDOMINAL PAIN, UNSPECIFIED ABDOMINAL LOCATION: Primary | ICD-10-CM

## 2022-07-05 NOTE — TELEPHONE ENCOUNTER
Mayra Parnell called back and said the high fiber diet is actually making her symptoms worse so she would like to proceed with the referral to GI. Referral is ordered and they will call the patient.

## 2022-11-22 ENCOUNTER — OFFICE VISIT (OUTPATIENT)
Dept: FAMILY MEDICINE CLINIC | Age: 22
End: 2022-11-22
Payer: COMMERCIAL

## 2022-11-22 VITALS
BODY MASS INDEX: 48.9 KG/M2 | WEIGHT: 293 LBS | TEMPERATURE: 98.9 F | SYSTOLIC BLOOD PRESSURE: 118 MMHG | DIASTOLIC BLOOD PRESSURE: 78 MMHG | HEART RATE: 101 BPM | OXYGEN SATURATION: 97 % | RESPIRATION RATE: 16 BRPM

## 2022-11-22 DIAGNOSIS — Z23 FLU VACCINE NEED: ICD-10-CM

## 2022-11-22 DIAGNOSIS — H61.23 BILATERAL HEARING LOSS DUE TO CERUMEN IMPACTION: Primary | ICD-10-CM

## 2022-11-22 PROCEDURE — 69209 REMOVE IMPACTED EAR WAX UNI: CPT | Performed by: NURSE PRACTITIONER

## 2022-11-22 PROCEDURE — 90674 CCIIV4 VAC NO PRSV 0.5 ML IM: CPT | Performed by: NURSE PRACTITIONER

## 2022-11-22 PROCEDURE — 90471 IMMUNIZATION ADMIN: CPT | Performed by: NURSE PRACTITIONER

## 2022-11-22 PROCEDURE — 99213 OFFICE O/P EST LOW 20 MIN: CPT | Performed by: NURSE PRACTITIONER

## 2022-11-22 RX ORDER — OMEPRAZOLE 40 MG/1
CAPSULE, DELAYED RELEASE ORAL
COMMUNITY
Start: 2022-11-03

## 2022-11-22 ASSESSMENT — ENCOUNTER SYMPTOMS
TROUBLE SWALLOWING: 0
EYE PAIN: 0
BACK PAIN: 0
COUGH: 0
ABDOMINAL PAIN: 0
CONSTIPATION: 0
NAUSEA: 0
SHORTNESS OF BREATH: 0
VOMITING: 0
EYE DISCHARGE: 0
WHEEZING: 0
EYE REDNESS: 0
RHINORRHEA: 0
ALLERGIC/IMMUNOLOGIC NEGATIVE: 1
SORE THROAT: 0
DIARRHEA: 0

## 2022-11-22 NOTE — PROGRESS NOTES
Bilateral ear irrigation performed with large amount of cerumen flushed from each ear. Patient tolerated well. Canals now clear and TM's visible.

## 2022-11-22 NOTE — PROGRESS NOTES
Immunizations Administered       Name Date Dose Route    Influenza, FLUCELVAX, (age 10 mo+), MDCK, PF, 0.5mL 11/22/2022 0.5 mL Intramuscular    Site: Deltoid- Left    Lot: [id number]    NDC: 42895-340-56

## 2022-11-22 NOTE — PROGRESS NOTES
[address] [place] [address] [address]  [facility] [address]  Dept: [phone]  Dept Fax: [phone]  Loc: [phone]     Visit Date:  11/22/2022      Patient:  Phan Rico  YOB: 2000    HPI:     Chief Complaint   Patient presents with    Otalgia     Left ear pain x1 month intermittently     Flu Vaccine       Patient with complaint of reduced hearing, ringing, mild left-sided pain worsening over the past month. Denies upper respiratory infection symptoms or seasonal allergies. Patient does not wear any type of ear plug. Otalgia   Associated symptoms include hearing loss. Pertinent negatives include no abdominal pain, coughing, diarrhea, headaches, rash, rhinorrhea, sore throat or vomiting. Medications    Current Outpatient Medications:     omeprazole (PRILOSEC) 40 MG delayed release capsule, TAKE 1 CAPSULE BY MOUTH EVERY DAY, Disp: , Rfl:     escitalopram (LEXAPRO) 20 MG tablet, Take 1 tablet by mouth nightly, Disp: 90 tablet, Rfl: 1    DUPIXENT 300 MG/2ML SOSY injection, Inject 300 mg into the skin every 14 days , Disp: , Rfl:     The patient is allergic to hydroxyzine hcl. Past Medical History  Enrrique Johnson  has a past medical history of Anxiety and Eczema. Subjective:      Review of Systems   Constitutional:  Negative for activity change, fatigue and fever. HENT:  Positive for ear pain and hearing loss. Negative for congestion, rhinorrhea, sore throat and trouble swallowing. Eyes:  Negative for pain, discharge and redness. Respiratory:  Negative for cough, shortness of breath and wheezing. Cardiovascular: Negative. Gastrointestinal:  Negative for abdominal pain, constipation, diarrhea, nausea and vomiting. Endocrine: Negative. Genitourinary:  Negative for dysuria, frequency and urgency. Musculoskeletal:  Negative for arthralgias, back pain and myalgias. Skin:  Negative for rash.    Allergic/Immunologic: Negative. Neurological:  Negative for dizziness, tremors, weakness and headaches. Hematological: Negative. Psychiatric/Behavioral:  Negative for dysphoric mood and sleep disturbance. The patient is not nervous/anxious. Objective:     /78   Pulse (!) 101   Temp 98.9 °F (37.2 °C) (Oral)   Resp 16   Wt (!) 324 lb (147 kg)   LMP  (LMP Unknown)   SpO2 97%   BMI 48.90 kg/m²     Physical Exam  Constitutional:       General: She is not in acute distress. Appearance: Normal appearance. She is well-developed. She is not diaphoretic. HENT:      Right Ear: Hearing and external ear normal. There is impacted cerumen. Left Ear: Hearing and external ear normal. There is impacted cerumen. Nose: Nose normal. No mucosal edema or rhinorrhea. Mouth/Throat:      Mouth: Mucous membranes are moist.      Dentition: Normal dentition. Pharynx: Uvula midline. No posterior oropharyngeal erythema. Tonsils: No tonsillar exudate. 0 on the right. 0 on the left. Eyes:      General: Lids are normal.         Right eye: No discharge. Left eye: No discharge. Conjunctiva/sclera: Conjunctivae normal.      Right eye: Right conjunctiva is not injected. No chemosis. Left eye: No chemosis. Pupils: Pupils are equal, round, and reactive to light. Neck:      Vascular: No JVD. Trachea: Trachea normal.   Cardiovascular:      Rate and Rhythm: Normal rate. Heart sounds: No murmur heard. Pulmonary:      Effort: Pulmonary effort is normal. No respiratory distress. Breath sounds: No stridor. Musculoskeletal:         General: No tenderness or deformity. Normal range of motion. Cervical back: Full passive range of motion without pain and normal range of motion. Skin:     General: Skin is warm. Capillary Refill: Capillary refill takes less than 2 seconds. Findings: No rash.    Neurological:      Mental Status: She is alert and oriented to person, place, and time.      GCS: GCS eye subscore is 4. GCS verbal subscore is 5. GCS motor subscore is 6. Cranial Nerves: No cranial nerve deficit. Coordination: Coordination normal.      Gait: Gait normal.   Psychiatric:         Mood and Affect: Mood is not anxious or depressed. Speech: Speech normal.         Behavior: Behavior normal. Behavior is not withdrawn or hyperactive. Behavior is cooperative. Thought Content: Thought content normal.         Judgment: Judgment normal.       Assessment/Plan:      Flory Beard was seen today for otalgia and flu vaccine. Diagnoses and all orders for this visit:    Bilateral hearing loss due to cerumen impaction  -     TX REMOVAL IMPACTED CERUMEN IRRIGATION/LVG UNILAT    Flu vaccine need  -     Influenza, FLUCELVAX, (age 10 mo+), IM, Preservative Free, 0.5 mL    Dark brown cerumen removed bilaterally from auditory canals by staff nurse without difficulty to reveal normal-looking TMs. Return if symptoms worsen or fail to improve. Patient instructions given lobod.         Electronically signed by ERIKA Blackwood CNP on 11/22/2022 at 4:12 PM

## 2023-01-03 RX ORDER — ESCITALOPRAM OXALATE 20 MG/1
TABLET ORAL
Qty: 90 TABLET | Refills: 2 | Status: SHIPPED | OUTPATIENT
Start: 2023-01-03

## 2023-01-19 ENCOUNTER — OFFICE VISIT (OUTPATIENT)
Dept: FAMILY MEDICINE CLINIC | Age: 23
End: 2023-01-19
Payer: COMMERCIAL

## 2023-01-19 VITALS
OXYGEN SATURATION: 98 % | WEIGHT: 293 LBS | BODY MASS INDEX: 47.95 KG/M2 | SYSTOLIC BLOOD PRESSURE: 118 MMHG | DIASTOLIC BLOOD PRESSURE: 68 MMHG | RESPIRATION RATE: 18 BRPM | HEART RATE: 100 BPM | TEMPERATURE: 98.1 F

## 2023-01-19 DIAGNOSIS — R06.83 SNORING: ICD-10-CM

## 2023-01-19 DIAGNOSIS — J35.1 TONSILLAR HYPERTROPHY: Primary | ICD-10-CM

## 2023-01-19 DIAGNOSIS — F32.2 MDD (MAJOR DEPRESSIVE DISORDER), SINGLE EPISODE, SEVERE , NO PSYCHOSIS (HCC): ICD-10-CM

## 2023-01-19 PROCEDURE — 99213 OFFICE O/P EST LOW 20 MIN: CPT | Performed by: FAMILY MEDICINE

## 2023-01-19 SDOH — ECONOMIC STABILITY: FOOD INSECURITY: WITHIN THE PAST 12 MONTHS, THE FOOD YOU BOUGHT JUST DIDN'T LAST AND YOU DIDN'T HAVE MONEY TO GET MORE.: NEVER TRUE

## 2023-01-19 SDOH — ECONOMIC STABILITY: FOOD INSECURITY: WITHIN THE PAST 12 MONTHS, YOU WORRIED THAT YOUR FOOD WOULD RUN OUT BEFORE YOU GOT MONEY TO BUY MORE.: NEVER TRUE

## 2023-01-19 ASSESSMENT — PATIENT HEALTH QUESTIONNAIRE - PHQ9
SUM OF ALL RESPONSES TO PHQ QUESTIONS 1-9: 2
SUM OF ALL RESPONSES TO PHQ QUESTIONS 1-9: 2
1. LITTLE INTEREST OR PLEASURE IN DOING THINGS: 0
5. POOR APPETITE OR OVEREATING: 0
SUM OF ALL RESPONSES TO PHQ9 QUESTIONS 1 & 2: 0
SUM OF ALL RESPONSES TO PHQ QUESTIONS 1-9: 2
SUM OF ALL RESPONSES TO PHQ QUESTIONS 1-9: 2
4. FEELING TIRED OR HAVING LITTLE ENERGY: 1
3. TROUBLE FALLING OR STAYING ASLEEP: 1
2. FEELING DOWN, DEPRESSED OR HOPELESS: 0
8. MOVING OR SPEAKING SO SLOWLY THAT OTHER PEOPLE COULD HAVE NOTICED. OR THE OPPOSITE, BEING SO FIGETY OR RESTLESS THAT YOU HAVE BEEN MOVING AROUND A LOT MORE THAN USUAL: 0
9. THOUGHTS THAT YOU WOULD BE BETTER OFF DEAD, OR OF HURTING YOURSELF: 0
10. IF YOU CHECKED OFF ANY PROBLEMS, HOW DIFFICULT HAVE THESE PROBLEMS MADE IT FOR YOU TO DO YOUR WORK, TAKE CARE OF THINGS AT HOME, OR GET ALONG WITH OTHER PEOPLE: 0
7. TROUBLE CONCENTRATING ON THINGS, SUCH AS READING THE NEWSPAPER OR WATCHING TELEVISION: 0
6. FEELING BAD ABOUT YOURSELF - OR THAT YOU ARE A FAILURE OR HAVE LET YOURSELF OR YOUR FAMILY DOWN: 0

## 2023-01-19 ASSESSMENT — SOCIAL DETERMINANTS OF HEALTH (SDOH): HOW HARD IS IT FOR YOU TO PAY FOR THE VERY BASICS LIKE FOOD, HOUSING, MEDICAL CARE, AND HEATING?: NOT HARD AT ALL

## 2023-01-22 ASSESSMENT — ENCOUNTER SYMPTOMS
BACK PAIN: 0
VOMITING: 0
RHINORRHEA: 0
DIARRHEA: 0
NAUSEA: 0
COUGH: 0
SHORTNESS OF BREATH: 0
ABDOMINAL PAIN: 0
SORE THROAT: 0
CHEST TIGHTNESS: 0
EYES NEGATIVE: 1

## 2023-01-22 NOTE — PROGRESS NOTES
[address] [place] [address]  Dept: [phone]  Dept Fax: [phone]  Loc: [phone]  PROGRESS NOTE      VisitDate: 1/19/2023    Gladys Wise is a 20 y.o. female who presents today for:     Chief Complaint   Patient presents with    Anxiety     Discuss possible medication increase or change,     Pharyngitis     ST a lot of the time, no other sick symptoms or fever, was sick last week         Subjective:  HPI  Is in follow-up anxiety and depression. Medication feels like is not as effective as it once was. Also notes a sore throat on and off frequently. Mom reports that she snores heavily. Denies chronic headaches    Review of Systems   Constitutional:  Positive for fatigue. Negative for activity change, appetite change and fever. HENT:  Negative for congestion, rhinorrhea and sore throat. Eyes: Negative. Respiratory:  Negative for cough, chest tightness and shortness of breath. Cardiovascular:  Negative for chest pain and palpitations. Gastrointestinal:  Negative for abdominal pain, diarrhea, nausea and vomiting. Genitourinary:  Negative for dysuria and urgency. Musculoskeletal:  Negative for arthralgias and back pain. Neurological:  Negative for dizziness and headaches. Psychiatric/Behavioral:  Positive for dysphoric mood. The patient is nervous/anxious.     Past Medical History:   Diagnosis Date    Anxiety     Eczema       Past Surgical History:   Procedure Laterality Date    UPPER GASTROINTESTINAL ENDOSCOPY      WISDOM TOOTH EXTRACTION       Family History   Problem Relation Age of Onset    Diabetes Mother     Asthma Paternal Grandmother     Cancer Paternal Grandfather     Asthma Paternal Grandfather     Depression Father     Allergy (Severe) Father     Colon Cancer Paternal Aunt     Cancer Paternal Aunt         Ovarian    Depression Paternal Uncle      Social History     Tobacco Use    Smoking status: Never    Smokeless tobacco: Never   Substance Use Topics    Alcohol use: Not Currently      Current Outpatient Medications   Medication Sig Dispense Refill    cariprazine hcl (VRAYLAR) 1.5 MG capsule Take 1 capsule by mouth daily 14 capsule 0    escitalopram (LEXAPRO) 20 MG tablet TAKE 1 TABLET BY MOUTH EVERY DAY AT NIGHT 90 tablet 2    omeprazole (PRILOSEC) 40 MG delayed release capsule TAKE 1 CAPSULE BY MOUTH EVERY DAY      DUPIXENT 300 MG/2ML SOSY injection Inject 300 mg into the skin every 14 days        No current facility-administered medications for this visit. Allergies   Allergen Reactions    Hydroxyzine Hcl Other (See Comments)     Panic attack 1 time, pt tolerates otherwise     Health Maintenance   Topic Date Due    HPV vaccine (3 - 2-dose series) 04/08/2011    HIV screen  Never done    Chlamydia/GC screen  Never done    Hepatitis C screen  Never done    Pap smear  Never done    COVID-19 Vaccine (3 - Booster for Farideh series) 08/10/2022    Depression Monitoring  01/19/2024    DTaP/Tdap/Td vaccine (6 - Td or Tdap) 08/01/2027    Hib vaccine  Completed    Varicella vaccine  Completed    Meningococcal (ACWY) vaccine  Completed    Flu vaccine  Completed    Hepatitis A vaccine  Aged Out    Pneumococcal 0-64 years Vaccine  Aged Out         Objective:     Physical Exam  Constitutional:       General: She is not in acute distress. Appearance: Normal appearance. She is well-developed. She is not diaphoretic. HENT:      Head: Normocephalic and atraumatic. Right Ear: External ear normal.      Left Ear: External ear normal.      Nose: Nose normal.      Mouth/Throat:      Pharynx: Oropharynx is clear. Comments: Tonsillar hypertrophy  Eyes:      General: No scleral icterus. Conjunctiva/sclera: Conjunctivae normal.   Neck:      Thyroid: No thyromegaly. Vascular: No JVD. Comments: No bruits  Cardiovascular:      Rate and Rhythm: Normal rate and regular rhythm.       Heart sounds: Normal heart sounds. Pulmonary:      Effort: Pulmonary effort is normal. No respiratory distress. Breath sounds: Normal breath sounds. No wheezing or rales. Abdominal:      Palpations: Abdomen is soft. There is no mass. Tenderness: There is no abdominal tenderness. There is no guarding. Musculoskeletal:         General: No tenderness. Skin:     General: Skin is warm and dry. Findings: No rash. Neurological:      Mental Status: She is alert and oriented to person, place, and time. Mental status is at baseline. Cranial Nerves: No cranial nerve deficit. Psychiatric:         Mood and Affect: Mood normal.     /68 (Site: Left Upper Arm)   Pulse 100   Temp 98.1 °F (36.7 °C) (Oral)   Resp 18   Wt (!) 317 lb 11.2 oz (144.1 kg)   LMP 01/12/2023   SpO2 98%   BMI 47.95 kg/m²       Impression/Plan:  1. Tonsillar hypertrophy    2. Snoring    3. MDD (major depressive disorder), single episode, severe , no psychosis ([facility])      Requested Prescriptions     Signed Prescriptions Disp Refills    cariprazine hcl (VRAYLAR) 1.5 MG capsule 14 capsule 0     Sig: Take 1 capsule by mouth daily     Orders Placed This Encounter   Procedures    Royce Marquez MD, Otolaryngology, [facility]     Referral Priority:   Routine     Referral Type:   Eval and Treat     Referral Reason:   Specialty Services Required     Referred to Provider:   Wei Busby MD     Requested Specialty:   Otolaryngology     Number of Visits Requested:   1       Patient giveneducational materials - see patient instructions. Discussed use, benefit, and side effects of prescribed medications. All patient questions answered. Pt voiced understanding. Reviewed health maintenance. Patient agreedwith treatment plan. Follow up as directed. **This report has been created using voice recognition software. It may contain minor errorswhich are inherent in voice recognition technology. **       Electronically signed by Paddy King Paulo Sun MD on 1/22/2023 at 9:46 AM

## 2023-01-30 NOTE — TELEPHONE ENCOUNTER
Patient called informing us that the Arleta Spells is working well for her and will need refill.      Last OV 01/19/2023

## 2023-02-03 ENCOUNTER — TELEPHONE (OUTPATIENT)
Dept: FAMILY MEDICINE CLINIC | Age: 23
End: 2023-02-03

## 2023-02-03 RX ORDER — QUETIAPINE FUMARATE 25 MG/1
25 TABLET, FILM COATED ORAL EVERY EVENING
Qty: 30 TABLET | Refills: 2 | Status: SHIPPED | OUTPATIENT
Start: 2023-02-03

## 2023-02-03 NOTE — TELEPHONE ENCOUNTER
[employer] sent a prior authorization paper on her Vraylar. They have 2 questions:    Is the generic atypical antipsychotic not in the best interest of the patient based on the medical necessity? Yes or No  Chart notes are required with documentation of this. Has the patient tried another prescription drug in the same pharmacologic class or with the same mechanism of action as a generic atypical antipsychotic and that prescription drug was discontinued due to lack of efficacy or effectiveness, diminished effect, or an adverse event? Yes or no--Chart notes are required with documentation of this. If not--will you be changing to something else?

## 2023-02-03 NOTE — TELEPHONE ENCOUNTER
Inform patient that insurance has denied this medication. Needed trial of generic, will change to Seroquel 25 mg every evening, 30, 2 refills.   If patient has lack of improvement or side effects from this medication please let us know immediately

## 2023-02-03 NOTE — TELEPHONE ENCOUNTER
Pt informed of the med change and will notify our office immediately if she has a lack of improvement or s/e from Seroquel. Seroquel was escribed to [facility].

## 2023-03-24 ENCOUNTER — PATIENT MESSAGE (OUTPATIENT)
Dept: FAMILY MEDICINE CLINIC | Age: 23
End: 2023-03-24

## 2023-03-24 NOTE — TELEPHONE ENCOUNTER
From: Annemarie Smallwood  To: Dr. Shelly Ewing: 3/24/2023 12:47 PM EDT  Subject: Quetiapine    Hello,   I was wanting to change this medication I am on. Navya Briscoe been feeling very spacey and Ajmatt also been having brain fog with it. It also seems to make me tired all the time. Im not sure if the dose is too much or if its just not the right fit for me.

## 2023-03-27 RX ORDER — ARIPIPRAZOLE 2 MG/1
2 TABLET ORAL DAILY
Qty: 30 TABLET | Refills: 2 | Status: SHIPPED | OUTPATIENT
Start: 2023-03-27

## 2023-05-11 SDOH — ECONOMIC STABILITY: INCOME INSECURITY: HOW HARD IS IT FOR YOU TO PAY FOR THE VERY BASICS LIKE FOOD, HOUSING, MEDICAL CARE, AND HEATING?: NOT HARD AT ALL

## 2023-05-11 SDOH — ECONOMIC STABILITY: TRANSPORTATION INSECURITY
IN THE PAST 12 MONTHS, HAS LACK OF TRANSPORTATION KEPT YOU FROM MEETINGS, WORK, OR FROM GETTING THINGS NEEDED FOR DAILY LIVING?: NO

## 2023-05-11 SDOH — ECONOMIC STABILITY: FOOD INSECURITY: WITHIN THE PAST 12 MONTHS, YOU WORRIED THAT YOUR FOOD WOULD RUN OUT BEFORE YOU GOT MONEY TO BUY MORE.: NEVER TRUE

## 2023-05-11 SDOH — ECONOMIC STABILITY: HOUSING INSECURITY
IN THE LAST 12 MONTHS, WAS THERE A TIME WHEN YOU DID NOT HAVE A STEADY PLACE TO SLEEP OR SLEPT IN A SHELTER (INCLUDING NOW)?: NO

## 2023-05-11 SDOH — ECONOMIC STABILITY: FOOD INSECURITY: WITHIN THE PAST 12 MONTHS, THE FOOD YOU BOUGHT JUST DIDN'T LAST AND YOU DIDN'T HAVE MONEY TO GET MORE.: NEVER TRUE

## 2023-05-12 ENCOUNTER — OFFICE VISIT (OUTPATIENT)
Dept: FAMILY MEDICINE CLINIC | Age: 23
End: 2023-05-12
Payer: COMMERCIAL

## 2023-05-12 VITALS
WEIGHT: 293 LBS | RESPIRATION RATE: 14 BRPM | BODY MASS INDEX: 43.4 KG/M2 | SYSTOLIC BLOOD PRESSURE: 118 MMHG | HEIGHT: 69 IN | TEMPERATURE: 97.9 F | DIASTOLIC BLOOD PRESSURE: 88 MMHG

## 2023-05-12 DIAGNOSIS — F41.9 ANXIETY: Primary | ICD-10-CM

## 2023-05-12 DIAGNOSIS — F32.2 MDD (MAJOR DEPRESSIVE DISORDER), SINGLE EPISODE, SEVERE , NO PSYCHOSIS (HCC): ICD-10-CM

## 2023-05-12 PROCEDURE — 99213 OFFICE O/P EST LOW 20 MIN: CPT | Performed by: FAMILY MEDICINE

## 2023-05-12 RX ORDER — ESCITALOPRAM OXALATE 10 MG/1
TABLET ORAL
COMMUNITY
End: 2023-05-12 | Stop reason: ALTCHOICE

## 2023-05-12 RX ORDER — DUPILUMAB 100 MG/.67ML
INJECTION, SOLUTION SUBCUTANEOUS
COMMUNITY

## 2023-05-12 RX ORDER — OMEPRAZOLE 10 MG/1
CAPSULE, DELAYED RELEASE ORAL
COMMUNITY
End: 2023-05-12 | Stop reason: SDUPTHER

## 2023-05-12 RX ORDER — BUSPIRONE HYDROCHLORIDE 5 MG/1
5 TABLET ORAL 2 TIMES DAILY
Qty: 180 TABLET | Refills: 3 | Status: SHIPPED | OUTPATIENT
Start: 2023-05-12 | End: 2023-06-11

## 2023-05-14 ASSESSMENT — ENCOUNTER SYMPTOMS
SHORTNESS OF BREATH: 0
CHEST TIGHTNESS: 0
NAUSEA: 0
BACK PAIN: 0
DIARRHEA: 0
RHINORRHEA: 0
VOMITING: 0
SORE THROAT: 0
COUGH: 0
ABDOMINAL PAIN: 0
EYES NEGATIVE: 1

## 2023-06-14 ENCOUNTER — PREP FOR PROCEDURE (OUTPATIENT)
Dept: ENT CLINIC | Age: 23
End: 2023-06-14

## 2023-06-14 DIAGNOSIS — J35.8 TONSILLITH: ICD-10-CM

## 2023-06-14 DIAGNOSIS — R06.83 SNORING: ICD-10-CM

## 2023-06-14 DIAGNOSIS — J35.1 TONSILLAR HYPERTROPHY: Primary | ICD-10-CM

## 2023-06-14 DIAGNOSIS — J35.01 CHRONIC TONSILLITIS: ICD-10-CM

## 2023-06-20 PROBLEM — J35.8 TONSILLITH: Status: ACTIVE | Noted: 2023-06-20

## 2023-06-20 PROBLEM — R06.83 SNORING: Status: ACTIVE | Noted: 2023-06-20

## 2023-06-20 PROBLEM — J35.1 TONSILLAR HYPERTROPHY: Status: ACTIVE | Noted: 2023-06-20

## 2023-06-20 PROBLEM — J35.01 CHRONIC TONSILLITIS: Status: ACTIVE | Noted: 2023-06-20

## 2023-06-20 RX ORDER — HYDROXYZINE HCL 10 MG/5 ML
10 SOLUTION, ORAL ORAL EVERY 4 HOURS PRN
Qty: 200 ML | Refills: 0 | Status: CANCELLED | OUTPATIENT
Start: 2023-06-20

## 2023-06-20 RX ORDER — SODIUM CHLORIDE 0.9 % (FLUSH) 0.9 %
5-40 SYRINGE (ML) INJECTION EVERY 12 HOURS SCHEDULED
Status: CANCELLED | OUTPATIENT
Start: 2023-06-20

## 2023-06-20 RX ORDER — SODIUM CHLORIDE 0.9 % (FLUSH) 0.9 %
5-40 SYRINGE (ML) INJECTION PRN
Status: CANCELLED | OUTPATIENT
Start: 2023-06-20

## 2023-06-20 RX ORDER — SODIUM CHLORIDE 9 MG/ML
INJECTION, SOLUTION INTRAVENOUS PRN
Status: CANCELLED | OUTPATIENT
Start: 2023-06-20

## 2023-06-21 ENCOUNTER — ANESTHESIA EVENT (OUTPATIENT)
Dept: OPERATING ROOM | Age: 23
End: 2023-06-21
Payer: COMMERCIAL

## 2023-06-21 ENCOUNTER — ANESTHESIA (OUTPATIENT)
Dept: OPERATING ROOM | Age: 23
End: 2023-06-21
Payer: COMMERCIAL

## 2023-06-21 ENCOUNTER — HOSPITAL ENCOUNTER (OUTPATIENT)
Age: 23
Discharge: HOME | End: 2023-06-22
Attending: OTOLARYNGOLOGY | Admitting: OTOLARYNGOLOGY
Payer: COMMERCIAL

## 2023-06-21 DIAGNOSIS — J35.1 TONSILLAR HYPERTROPHY: Primary | ICD-10-CM

## 2023-06-21 DIAGNOSIS — R06.83 SNORING: ICD-10-CM

## 2023-06-21 DIAGNOSIS — J35.01 CHRONIC TONSILLITIS: ICD-10-CM

## 2023-06-21 DIAGNOSIS — J35.8 TONSILLITH: ICD-10-CM

## 2023-06-21 PROBLEM — Z90.89 S/P TONSILLECTOMY: Status: ACTIVE | Noted: 2023-06-21

## 2023-06-21 LAB — PREGNANCY, URINE: NEGATIVE

## 2023-06-21 PROCEDURE — 3600000002 HC SURGERY LEVEL 2 BASE: Performed by: OTOLARYNGOLOGY

## 2023-06-21 PROCEDURE — 2580000003 HC RX 258: Performed by: OTOLARYNGOLOGY

## 2023-06-21 PROCEDURE — 3700000001 HC ADD 15 MINUTES (ANESTHESIA): Performed by: OTOLARYNGOLOGY

## 2023-06-21 PROCEDURE — 7100000000 HC PACU RECOVERY - FIRST 15 MIN: Performed by: OTOLARYNGOLOGY

## 2023-06-21 PROCEDURE — 88304 TISSUE EXAM BY PATHOLOGIST: CPT

## 2023-06-21 PROCEDURE — 42826 REMOVAL OF TONSILS: CPT | Performed by: OTOLARYNGOLOGY

## 2023-06-21 PROCEDURE — 6360000002 HC RX W HCPCS: Performed by: NURSE ANESTHETIST, CERTIFIED REGISTERED

## 2023-06-21 PROCEDURE — 81025 URINE PREGNANCY TEST: CPT

## 2023-06-21 PROCEDURE — 6360000002 HC RX W HCPCS: Performed by: OTOLARYNGOLOGY

## 2023-06-21 PROCEDURE — 2500000003 HC RX 250 WO HCPCS: Performed by: NURSE ANESTHETIST, CERTIFIED REGISTERED

## 2023-06-21 PROCEDURE — 6370000000 HC RX 637 (ALT 250 FOR IP): Performed by: OTOLARYNGOLOGY

## 2023-06-21 PROCEDURE — 6360000002 HC RX W HCPCS: Performed by: ANESTHESIOLOGY

## 2023-06-21 PROCEDURE — 3600000012 HC SURGERY LEVEL 2 ADDTL 15MIN: Performed by: OTOLARYNGOLOGY

## 2023-06-21 PROCEDURE — 2709999900 HC NON-CHARGEABLE SUPPLY: Performed by: OTOLARYNGOLOGY

## 2023-06-21 PROCEDURE — 3700000000 HC ANESTHESIA ATTENDED CARE: Performed by: OTOLARYNGOLOGY

## 2023-06-21 PROCEDURE — 7100000001 HC PACU RECOVERY - ADDTL 15 MIN: Performed by: OTOLARYNGOLOGY

## 2023-06-21 PROCEDURE — 2720000010 HC SURG SUPPLY STERILE: Performed by: OTOLARYNGOLOGY

## 2023-06-21 RX ORDER — BUSPIRONE HYDROCHLORIDE 5 MG/1
5 TABLET ORAL 2 TIMES DAILY
Status: DISCONTINUED | OUTPATIENT
Start: 2023-06-21 | End: 2023-06-22 | Stop reason: HOSPADM

## 2023-06-21 RX ORDER — BUSPIRONE HYDROCHLORIDE 10 MG/1
10 TABLET ORAL 2 TIMES DAILY
Status: DISCONTINUED | OUTPATIENT
Start: 2023-06-21 | End: 2023-06-21

## 2023-06-21 RX ORDER — SODIUM CHLORIDE 0.9 % (FLUSH) 0.9 %
5-40 SYRINGE (ML) INJECTION PRN
Status: DISCONTINUED | OUTPATIENT
Start: 2023-06-21 | End: 2023-06-21 | Stop reason: HOSPADM

## 2023-06-21 RX ORDER — SODIUM CHLORIDE 9 MG/ML
INJECTION, SOLUTION INTRAVENOUS PRN
Status: DISCONTINUED | OUTPATIENT
Start: 2023-06-21 | End: 2023-06-21 | Stop reason: HOSPADM

## 2023-06-21 RX ORDER — PROPOFOL 10 MG/ML
INJECTION, EMULSION INTRAVENOUS PRN
Status: DISCONTINUED | OUTPATIENT
Start: 2023-06-21 | End: 2023-06-21 | Stop reason: SDUPTHER

## 2023-06-21 RX ORDER — SODIUM CHLORIDE 0.9 % (FLUSH) 0.9 %
5-40 SYRINGE (ML) INJECTION PRN
Status: DISCONTINUED | OUTPATIENT
Start: 2023-06-21 | End: 2023-06-22 | Stop reason: HOSPADM

## 2023-06-21 RX ORDER — SODIUM CHLORIDE 9 MG/ML
INJECTION, SOLUTION INTRAVENOUS PRN
Status: DISCONTINUED | OUTPATIENT
Start: 2023-06-21 | End: 2023-06-22 | Stop reason: HOSPADM

## 2023-06-21 RX ORDER — ONDANSETRON 2 MG/ML
INJECTION INTRAMUSCULAR; INTRAVENOUS PRN
Status: DISCONTINUED | OUTPATIENT
Start: 2023-06-21 | End: 2023-06-21 | Stop reason: SDUPTHER

## 2023-06-21 RX ORDER — SODIUM CHLORIDE 0.9 % (FLUSH) 0.9 %
5-40 SYRINGE (ML) INJECTION EVERY 12 HOURS SCHEDULED
Status: DISCONTINUED | OUTPATIENT
Start: 2023-06-21 | End: 2023-06-21 | Stop reason: HOSPADM

## 2023-06-21 RX ORDER — FENTANYL CITRATE 50 UG/ML
INJECTION, SOLUTION INTRAMUSCULAR; INTRAVENOUS PRN
Status: DISCONTINUED | OUTPATIENT
Start: 2023-06-21 | End: 2023-06-21 | Stop reason: SDUPTHER

## 2023-06-21 RX ORDER — SODIUM CHLORIDE 9 MG/ML
INJECTION, SOLUTION INTRAVENOUS CONTINUOUS
Status: DISCONTINUED | OUTPATIENT
Start: 2023-06-21 | End: 2023-06-22 | Stop reason: HOSPADM

## 2023-06-21 RX ORDER — OMEPRAZOLE 20 MG/1
40 CAPSULE, DELAYED RELEASE ORAL DAILY
Status: DISCONTINUED | OUTPATIENT
Start: 2023-06-22 | End: 2023-06-21 | Stop reason: SDUPTHER

## 2023-06-21 RX ORDER — ROCURONIUM BROMIDE 10 MG/ML
INJECTION, SOLUTION INTRAVENOUS PRN
Status: DISCONTINUED | OUTPATIENT
Start: 2023-06-21 | End: 2023-06-21 | Stop reason: SDUPTHER

## 2023-06-21 RX ORDER — ESCITALOPRAM OXALATE 20 MG/1
20 TABLET ORAL NIGHTLY
Status: DISCONTINUED | OUTPATIENT
Start: 2023-06-21 | End: 2023-06-22 | Stop reason: HOSPADM

## 2023-06-21 RX ORDER — OMEPRAZOLE 20 MG/1
40 CAPSULE, DELAYED RELEASE ORAL NIGHTLY
Status: DISCONTINUED | OUTPATIENT
Start: 2023-06-22 | End: 2023-06-22 | Stop reason: HOSPADM

## 2023-06-21 RX ORDER — FENTANYL CITRATE 50 UG/ML
25 INJECTION, SOLUTION INTRAMUSCULAR; INTRAVENOUS EVERY 5 MIN PRN
Status: DISCONTINUED | OUTPATIENT
Start: 2023-06-21 | End: 2023-06-21 | Stop reason: HOSPADM

## 2023-06-21 RX ORDER — DEXAMETHASONE SODIUM PHOSPHATE 10 MG/ML
INJECTION, EMULSION INTRAMUSCULAR; INTRAVENOUS PRN
Status: DISCONTINUED | OUTPATIENT
Start: 2023-06-21 | End: 2023-06-21 | Stop reason: SDUPTHER

## 2023-06-21 RX ORDER — PANTOPRAZOLE SODIUM 40 MG/1
40 TABLET, DELAYED RELEASE ORAL DAILY
Status: DISCONTINUED | OUTPATIENT
Start: 2023-06-21 | End: 2023-06-21 | Stop reason: CLARIF

## 2023-06-21 RX ORDER — FENTANYL CITRATE 50 UG/ML
50 INJECTION, SOLUTION INTRAMUSCULAR; INTRAVENOUS EVERY 5 MIN PRN
Status: COMPLETED | OUTPATIENT
Start: 2023-06-21 | End: 2023-06-21

## 2023-06-21 RX ORDER — SODIUM CHLORIDE 0.9 % (FLUSH) 0.9 %
5-40 SYRINGE (ML) INJECTION EVERY 12 HOURS SCHEDULED
Status: DISCONTINUED | OUTPATIENT
Start: 2023-06-21 | End: 2023-06-22 | Stop reason: HOSPADM

## 2023-06-21 RX ORDER — ROPIVACAINE HYDROCHLORIDE 5 MG/ML
INJECTION, SOLUTION EPIDURAL; INFILTRATION; PERINEURAL PRN
Status: DISCONTINUED | OUTPATIENT
Start: 2023-06-21 | End: 2023-06-21 | Stop reason: ALTCHOICE

## 2023-06-21 RX ORDER — LIDOCAINE HYDROCHLORIDE 20 MG/ML
INJECTION, SOLUTION INTRAVENOUS PRN
Status: DISCONTINUED | OUTPATIENT
Start: 2023-06-21 | End: 2023-06-21 | Stop reason: SDUPTHER

## 2023-06-21 RX ADMIN — PROPOFOL 170 MG: 10 INJECTION, EMULSION INTRAVENOUS at 15:07

## 2023-06-21 RX ADMIN — PANTOPRAZOLE SODIUM 40 MG: 40 TABLET, DELAYED RELEASE ORAL at 19:52

## 2023-06-21 RX ADMIN — SODIUM CHLORIDE: 9 INJECTION, SOLUTION INTRAVENOUS at 13:44

## 2023-06-21 RX ADMIN — HYDROMORPHONE HYDROCHLORIDE 0.5 MG: 1 INJECTION, SOLUTION INTRAMUSCULAR; INTRAVENOUS; SUBCUTANEOUS at 23:08

## 2023-06-21 RX ADMIN — DEXAMETHASONE SODIUM PHOSPHATE 15 MG: 10 INJECTION, EMULSION INTRAMUSCULAR; INTRAVENOUS at 15:49

## 2023-06-21 RX ADMIN — FENTANYL CITRATE 50 MCG: 50 INJECTION, SOLUTION INTRAMUSCULAR; INTRAVENOUS at 14:59

## 2023-06-21 RX ADMIN — BUSPIRONE HYDROCHLORIDE 5 MG: 5 TABLET ORAL at 23:08

## 2023-06-21 RX ADMIN — SUGAMMADEX 300 MG: 100 INJECTION, SOLUTION INTRAVENOUS at 16:06

## 2023-06-21 RX ADMIN — FENTANYL CITRATE 50 MCG: 50 INJECTION, SOLUTION INTRAMUSCULAR; INTRAVENOUS at 15:19

## 2023-06-21 RX ADMIN — ROCURONIUM BROMIDE 40 MG: 10 INJECTION INTRAVENOUS at 15:07

## 2023-06-21 RX ADMIN — ESCITALOPRAM OXALATE 20 MG: 20 TABLET, FILM COATED ORAL at 19:52

## 2023-06-21 RX ADMIN — ROCURONIUM BROMIDE 10 MG: 10 INJECTION INTRAVENOUS at 15:39

## 2023-06-21 RX ADMIN — SODIUM CHLORIDE: 9 INJECTION, SOLUTION INTRAVENOUS at 23:07

## 2023-06-21 RX ADMIN — FENTANYL CITRATE 50 MCG: 50 INJECTION, SOLUTION INTRAMUSCULAR; INTRAVENOUS at 15:41

## 2023-06-21 RX ADMIN — FENTANYL CITRATE 50 MCG: 50 INJECTION, SOLUTION INTRAMUSCULAR; INTRAVENOUS at 16:50

## 2023-06-21 RX ADMIN — HYDROMORPHONE HYDROCHLORIDE 0.5 MG: 1 INJECTION, SOLUTION INTRAMUSCULAR; INTRAVENOUS; SUBCUTANEOUS at 19:51

## 2023-06-21 RX ADMIN — FENTANYL CITRATE 50 MCG: 50 INJECTION, SOLUTION INTRAMUSCULAR; INTRAVENOUS at 16:40

## 2023-06-21 RX ADMIN — FENTANYL CITRATE 50 MCG: 50 INJECTION, SOLUTION INTRAMUSCULAR; INTRAVENOUS at 16:13

## 2023-06-21 RX ADMIN — LIDOCAINE HYDROCHLORIDE 100 MG: 20 INJECTION, SOLUTION INTRAVENOUS at 15:06

## 2023-06-21 RX ADMIN — ONDANSETRON 4 MG: 2 INJECTION INTRAMUSCULAR; INTRAVENOUS at 15:22

## 2023-06-21 ASSESSMENT — PAIN DESCRIPTION - FREQUENCY
FREQUENCY: CONTINUOUS

## 2023-06-21 ASSESSMENT — PAIN DESCRIPTION - ONSET
ONSET: ON-GOING

## 2023-06-21 ASSESSMENT — PAIN DESCRIPTION - PAIN TYPE
TYPE: SURGICAL PAIN

## 2023-06-21 ASSESSMENT — PAIN DESCRIPTION - DESCRIPTORS
DESCRIPTORS: ACHING
DESCRIPTORS: SHARP
DESCRIPTORS: ACHING

## 2023-06-21 ASSESSMENT — PAIN - FUNCTIONAL ASSESSMENT
PAIN_FUNCTIONAL_ASSESSMENT: ACTIVITIES ARE NOT PREVENTED
PAIN_FUNCTIONAL_ASSESSMENT: 0-10

## 2023-06-21 ASSESSMENT — PAIN SCALES - GENERAL
PAINLEVEL_OUTOF10: 7
PAINLEVEL_OUTOF10: 7
PAINLEVEL_OUTOF10: 4
PAINLEVEL_OUTOF10: 2
PAINLEVEL_OUTOF10: 7
PAINLEVEL_OUTOF10: 7

## 2023-06-21 ASSESSMENT — PAIN DESCRIPTION - LOCATION
LOCATION: THROAT

## 2023-06-21 NOTE — INTERVAL H&P NOTE
Pt Name: Nikita Chang  MRN: [id number]  YOB: 2000  Date of evaluation: 6/21/2023    I have examined the patient and reviewed the H&P/Consult and there are no changes to the patient or plans.          Electronically signed by Sherry Ayala MD on 6/21/2023 at 1:45 PM

## 2023-06-21 NOTE — PROGRESS NOTES
1613: Pt arrives to pacu, responds to verbal stimuli. Pt on 6LNC, respirations easy and unlabored. VSS  1620: Pt awakens on her own, states pain 2/10 and tolerable  1640: Pt states pain increased and requesting pain medication, 50mcg of fentanyl administered  1650: No change in pain, 50mcg of fentanyl given  1700: Pt alert, states pain tolerable  1715: Report given to Nikki Beltran on [address]: Pt transported to [address] in stable condition.  VSS

## 2023-06-21 NOTE — PROGRESS NOTES
Pt admitted to [facility]  and oriented to unit. SCD sleeves applied. Nares swabbed. Pt verbalized permission for first name, last initial and physicians name on white board. SDS board and discharge criteria explained, pt and family verbalized understanding. Pt denies thoughts of harming self or others. Call light in reach. Family at the bedside.   Jens Adam [phone]

## 2023-06-21 NOTE — H&P
[facility] [place] [facility]  [facility] EAR, NOSE AND THROAT  [facility]  Dept: [phone]  Dept Fax: [phone]  Loc: [phone]    Belinda Mcgraw is a 20 y.o. female who was referred by Iron Hamilton MD for:  Chief Complaint   Patient presents with    New Patient     Patient is here for Tonsillar hypertrophy, Snoring, Referred by Ciro Ahumada MD R/S from 3/30/2023 Provider   . HPI:     Belinda Mcgraw presents today for evaluation of enlarged tonsils and snoring. She reports that she has been dealing with recurrent swelling and discomfort of her tonsils since she was in high school. She reports that spontaneously every 1 to 2 months she will wake up with swollen tonsils and pain. She states the pain and swelling will persist for at least a few days and then typically gradually resolves on its own. She does occasionally need treated with antibiotics and steroids for these flareups. She has been tested for strep a few times during these flares, but each time testing was negative. She states that the antibiotics and steroids seem to help her symptoms resolve quicker. She reports that she can feel food getting stuck in her right tonsil area when eating intermittently. She reportedly snores on a nightly basis and will snore even she falls asleep in a chair sitting up. She is accompanied by her mother today who has not witnessed any apneas. The mother is aware of apneas presentation because the patient's father has LUCIE. The patient does report chronic tonsil stones. She reports halitosis when she has the stones. She frequently will cough the stones out when she has them. The patient denies chronic/recurrent nasal congestion. She does get intermittent stuffiness when her allergies flareup which she takes an OTC antihistamine for. She is on Dupixent for eczema which also seems to help her allergy and eczema symptoms.   No personal or

## 2023-06-21 NOTE — BRIEF OP NOTE
Brief Postoperative Note      Patient: Castro Napier  YOB: 2000  MRN: [id number]    Date of Procedure: 6/21/2023    Pre-Op Diagnosis Codes:     * Tonsillar hypertrophy [J35.1]     * Snoring [R06.83]     * Chronic tonsillitis [J35.01]     * Tonsillith [J35.8]     * morbid obesity     * LUCIE    Post-Op Diagnosis: Same       Procedure(s):  TONSILLECTOMY    Surgeon(s):  Francesco Marinelli MD    Assistant:  * No surgical staff found *    Anesthesia: General    Estimated Blood Loss (mL): Minimal    Complications: None    Specimens:   ID Type Source Tests Collected by Time Destination   A : Right Tonsil Tissue Tonsil SURGICAL PATHOLOGY Francesco Marinelli MD 6/21/2023 1532    B : Left Tonsil Tissue Tonsil SURGICAL PATHOLOGY Francesco Marinelli MD 6/21/2023 1532        Implants:  * No implants in log *      Drains:   [REMOVED] NG/OG/NJ/NE Tube [facility] mouth (Removed)       Findings: This 23-year-old female has a history of struct of sleep apnea and morbid obesity. She has huge tonsils with recurrent infections. BMI is over 50. At surgery her tonsils were 4+ adenoids were 1+, and so the adenoids were not removed. Patient had limited excursion of her mandible and with her large tongue and obesity, I was concerned about her airway postoperatively. She would have absorbed a large amount of the anesthetic gas and with her obesity that could come out very slowly. It is medically indicated to keep her on observation with monitoring for pain control as well as observation for her airway.   .      Electronically signed by Eun Fitzpatrick MD on 6/21/2023 at 4:25 PM

## 2023-06-21 NOTE — PLAN OF CARE
Problem: Discharge Planning  Goal: Discharge to home or other facility with appropriate resources  Outcome: Progressing  Flowsheets (Taken 6/21/2023 1812)  Discharge to home or other facility with appropriate resources:   Identify barriers to discharge with patient and caregiver   Arrange for needed discharge resources and transportation as appropriate   Identify discharge learning needs (meds, wound care, etc)   Refer to discharge planning if patient needs post-hospital services based on physician order or complex needs related to functional status, cognitive ability or social support system     Problem: Pain  Goal: Verbalizes/displays adequate comfort level or baseline comfort level  Outcome: Progressing  Flowsheets (Taken 6/21/2023 1812)  Verbalizes/displays adequate comfort level or baseline comfort level:   Encourage patient to monitor pain and request assistance   Assess pain using appropriate pain scale   Administer analgesics based on type and severity of pain and evaluate response   Implement non-pharmacological measures as appropriate and evaluate response   Consider cultural and social influences on pain and pain management   Notify Licensed Independent Practitioner if interventions unsuccessful or patient reports new pain   Care plan reviewed with patient and mom. Patient and mom verbalize understanding of the plan of care and contribute to goal setting.

## 2023-06-21 NOTE — ANESTHESIA PRE PROCEDURE
Department of Anesthesiology  Preprocedure Note       Name:  Belinda Mcgraw   Age:  20 y.o.  :  2000                                          MRN:  [id number]         Date:  2023      Surgeon: Kip Stringer):  Alex Driver MD    Procedure: Procedure(s):  TONSILLECTOMY AND POSSIBLE ADENOIDECTOMY    Medications prior to admission:   Prior to Admission medications    Medication Sig Start Date End Date Taking? Authorizing Provider   busPIRone (BUSPAR) 10 MG tablet Take 1 tablet by mouth 2 times daily    Historical Provider, MD   Dupilumab (DUPIXENT) 100 MG/0.67ML SOSY Every 2 weeks    Historical Provider, MD   escitalopram (LEXAPRO) 20 MG tablet TAKE 1 TABLET BY MOUTH EVERY DAY AT NIGHT 1/3/23   Didi Tan MD   omeprazole (PRILOSEC) 40 MG delayed release capsule TAKE 1 CAPSULE BY MOUTH EVERY DAY 11/3/22   Historical Provider, MD       Current medications:    Current Facility-Administered Medications   Medication Dose Route Frequency Provider Last Rate Last Admin    sodium chloride flush 0.9 % injection 5-40 mL  5-40 mL IntraVENous 2 times per day Alex Driver MD        sodium chloride flush 0.9 % injection 5-40 mL  5-40 mL IntraVENous PRN Alex Driver MD        0.9 % sodium chloride infusion   IntraVENous PRN Alex Driver MD           Allergies: Allergies   Allergen Reactions    Hydroxyzine Hcl Other (See Comments)     Panic attack 1 time, pt tolerates otherwise       Problem List:    Patient Active Problem List   Diagnosis Code    Eczema L30.9    MDD (major depressive disorder), single episode, severe , no psychosis ([facility] [address] F32.2    Panic disorder without agoraphobia F41.0    Depression with suicidal ideation F32. A, R45.851    Tonsillar hypertrophy J35.1    Chronic tonsillitis J35.01    Tonsillith J35.8    Snoring R06.83       Past Medical History:        Diagnosis Date    Anxiety     Eczema     Enlarged tonsils        Past Surgical History:        Procedure Laterality Date

## 2023-06-21 NOTE — DISCHARGE INSTRUCTIONS
HOME CARE DISCHARGE INSTRUCTIONS  Neris Nuñez MD    Surgical Procedure: Tonsillectomy     Bathing:   No restrictions    Food and Drink:  Regular diet, few restrictions except avoid acid, salty, or spicy. And no garlic or foods with hard sharp edges. Encourage fluids to avoid dehydration. May use Ensure to supplement caloric intake. Do not use a straw for two weeks    Activity:  No strenuous activity for 2 weeks. May return to school/work in 7 days assuming off narcotics. Medications:  Continue all previous medications per doctor's original instructions. Pain control is critical for good recovery after surgery. Please do not hesitate to provide adequate pain control. Pain medicine will be prescribed. Normally the dose and frequency needed becomes apparent fairly quickly. You should call your Dr if the pain relief is not adequate. When the pain decreases, usually about day 6, cut back and/or try switching to plain tylenol  To keep track, keep a written record of when you take your pain medicine. Do not take multiple acetaminophen-containing medications at the same time. Cepacol anesthetic lozenges can be very helpful 15 to 20 minutes before taking the Hycet elixir. Do not use more often than the package recommends. Call the doctor if any of the following occurs:  Any significant bleeding. It is normal to have slight bloody saliva for a few hours, but not clots or to spit up blood. If you see this, go to [address] [place] [address] emergency room and they will notify Dr. Nora Payne or the Physician on call. Temperature up to 101.5 F may be expected for a few days after surgery. If this persists, or goes higher at any time, call. Referred ear pain is expected. If it is associated with either ear drainage or a decrease in hearing, call. Vomiting. Lack of adequate fluid intake at any time, or failure to take in adequate calories.  It takes hydration and nutrition to heal in a timely fashion, and more rapid

## 2023-06-21 NOTE — PROGRESS NOTES
Pt arrived per cart from  accompanied by mother. Oriented to plan of care and unit. Iv of ns infusing without difficulty.   Denies needs at present

## 2023-06-22 VITALS
WEIGHT: 293 LBS | HEART RATE: 86 BPM | SYSTOLIC BLOOD PRESSURE: 109 MMHG | DIASTOLIC BLOOD PRESSURE: 92 MMHG | BODY MASS INDEX: 44.41 KG/M2 | OXYGEN SATURATION: 97 % | TEMPERATURE: 97.3 F | HEIGHT: 68 IN | RESPIRATION RATE: 16 BRPM

## 2023-06-22 PROCEDURE — APPNB45 APP NON BILLABLE 31-45 MINUTES: Performed by: NURSE PRACTITIONER

## 2023-06-22 PROCEDURE — 6370000000 HC RX 637 (ALT 250 FOR IP): Performed by: OTOLARYNGOLOGY

## 2023-06-22 RX ORDER — DOCUSATE SODIUM 100 MG/1
100 CAPSULE, LIQUID FILLED ORAL 2 TIMES DAILY
Qty: 28 CAPSULE | Refills: 0 | Status: SHIPPED | OUTPATIENT
Start: 2023-06-22 | End: 2023-07-06

## 2023-06-22 RX ADMIN — HYDROCODONE BITARTRATE AND ACETAMINOPHEN 10 ML: 5; 217 SOLUTION ORAL at 03:48

## 2023-06-22 RX ADMIN — HYDROCODONE BITARTRATE AND ACETAMINOPHEN 10 ML: 5; 217 SOLUTION ORAL at 08:22

## 2023-06-22 ASSESSMENT — PAIN SCALES - GENERAL
PAINLEVEL_OUTOF10: 4
PAINLEVEL_OUTOF10: 6
PAINLEVEL_OUTOF10: 5
PAINLEVEL_OUTOF10: 3

## 2023-06-22 ASSESSMENT — PAIN - FUNCTIONAL ASSESSMENT: PAIN_FUNCTIONAL_ASSESSMENT: ACTIVITIES ARE NOT PREVENTED

## 2023-06-22 ASSESSMENT — PAIN DESCRIPTION - DESCRIPTORS: DESCRIPTORS: ACHING

## 2023-06-22 ASSESSMENT — PAIN DESCRIPTION - PAIN TYPE
TYPE: SURGICAL PAIN
TYPE: SURGICAL PAIN

## 2023-06-22 ASSESSMENT — PAIN DESCRIPTION - LOCATION
LOCATION: THROAT
LOCATION: THROAT

## 2023-06-22 ASSESSMENT — PAIN DESCRIPTION - FREQUENCY: FREQUENCY: CONTINUOUS

## 2023-06-22 ASSESSMENT — PAIN DESCRIPTION - ONSET: ONSET: ON-GOING

## 2023-06-22 NOTE — DISCHARGE SUMMARY
DISCHARGE SUMMARY    Pt Name: Abigail Huggins  MRN: [id number]  YOB: 2000  Primary Care Physician: Jovanna Araujo MD    Admit date:  6/21/2023 10:14 AM  Discharge date:  No discharge date for patient encounter. Disposition: Home  Admitting Diagnosis:   1. Tonsillar hypertrophy    2. Chronic tonsillitis    3. Snoring    4. Tonsillith      Discharge Diagnosis:   Patient Active Problem List   Diagnosis Code    Eczema L30.9    MDD (major depressive disorder), single episode, severe , no psychosis ([facility] [address] F32.2    Panic disorder without agoraphobia F41.0    Depression with suicidal ideation F32. A, R45.851    Tonsillar hypertrophy J35.1    Chronic tonsillitis J35.01    Tonsillith J35.8    Snoring R06.83    S/P tonsillectomy Z90.89     Consultants:  {consultation:95387}  Procedures/Diagnostic Test:  [facility] Course: Cristina Parks originally presented to the hospital on 6/21/2023 10:14 AM ***. She was admitted overnight for *** pain control, observation of bleeding, observation of airway and initiation of PO intake. At time of discharge, Cristina Parks was tolerating a {SLP TOLERATE DIET:3475724201::\"regular diet\"}, having bowel movements, ambulating on her own accord and had adequate analgesia on oral pain medications. Patient had no signs or symptoms of complications. Patient is discharged in stable condition. PHYSICAL EXAMINATION     Discharge Vitals:  height is 5' 8\" (1.727 m) and weight is 330 lb 3.2 oz (149.8 kg) (abnormal). Her oral temperature is 98.2 °F (36.8 °C). Her blood pressure is 126/78 and her pulse is 80. Her respiration is 16 and oxygen saturation is 96%.      General appearance - {:906703::alert, well appearing, and in no distress}  Chest - {:200566::clear to ausculation}  Heart - {:664725::normal rate and regular rhythm}  Incision - {Desc; incision:31846::\"healing well\",\"no drainage\"}  HEENT - {WDMV:819466954}, {TM POS LIST:71886}, {NOSE/SINUS EXAM:51304}, {Oropharynx brief exam:92303}, {NECK

## 2023-06-22 NOTE — OP NOTE
Operative Note      Patient: Addis Valencia  YOB: 2000  MRN: [id number]    Date of Procedure: 6/21/2023    Pre-Op Diagnosis Codes:     * Tonsillar hypertrophy [J35.1]     * Snoring [R06.83]     * Chronic tonsillitis [J35.01]     * Tonsillith [J35.8]     * morbid obesity     * LUCIE      Post-Op Diagnosis: Same       Procedure(s):  TONSILLECTOMY    Surgeon(s):  Marichuy James MD    Assistant:   * No surgical staff found *    Anesthesia: General    Estimated Blood Loss (mL): Minimal    Complications: None    Specimens:   ID Type Source Tests Collected by Time Destination   A : Right Tonsil Tissue Tonsil SURGICAL PATHOLOGY Marichuy James MD 6/21/2023 1532    B : Left Tonsil Tissue Tonsil SURGICAL PATHOLOGY Marichuy James MD 6/21/2023 1532        Implants:  * No implants in log *      Drains:   [REMOVED] NG/OG/NJ/NE Tube O[facility] mouth (Removed)       Findings: This 76-year-old female has a history of struct of sleep apnea and morbid obesity. She has huge tonsils with recurrent infections. BMI is over 50. At surgery her tonsils were 4+ adenoids were 1+, and so the adenoids were not removed. Patient had limited excursion of her mandible and with her large tongue and obesity, I was concerned about her airway postoperatively. She would have absorbed a large amount of the anesthetic gas and with her obesity that could come out very slowly. It is medically indicated to keep her on observation with monitoring for pain control as well as observation for her airway. Detailed Description of Procedure:     After an adequate level of general endotracheal anesthesia had been obtained, patient was draped in usual fashion for tonsillectomy. Mouthgag was placed, soft palate was elevated with a #10 red Garcia catheter, and findings were noted as above. Adenoids were noted to be vestigial, not obstructing, and not chronically infected. They were not removed.     Tonsils were removed with

## 2023-06-22 NOTE — PROGRESS NOTES
Discharge instructions reviewed with patient and her mother. Questions answered and they stated understanding.

## 2023-06-22 NOTE — PLAN OF CARE
Problem: Discharge Planning  Goal: Discharge to home or other facility with appropriate resources  6/22/2023 0911 by Neda Alfaro RN  Outcome: Progressing  Flowsheets (Taken 6/22/2023 0911)  Discharge to home or other facility with appropriate resources:   Identify barriers to discharge with patient and caregiver   Arrange for needed discharge resources and transportation as appropriate   Identify discharge learning needs (meds, wound care, etc)     Problem: Pain  Goal: Verbalizes/displays adequate comfort level or baseline comfort level  6/22/2023 0911 by Neda Alfaro RN  Outcome: Progressing  Flowsheets (Taken 6/22/2023 0911)  Verbalizes/displays adequate comfort level or baseline comfort level:   Encourage patient to monitor pain and request assistance   Assess pain using appropriate pain scale   Administer analgesics based on type and severity of pain and evaluate response   Implement non-pharmacological measures as appropriate and evaluate response     Problem: Respiratory - Adult  Goal: Achieves optimal ventilation and oxygenation  Outcome: Progressing  Flowsheets (Taken 6/22/2023 0911)  Achieves optimal ventilation and oxygenation:   Assess for changes in respiratory status   Position to facilitate oxygenation and minimize respiratory effort   Oxygen supplementation based on oxygen saturation or arterial blood gases     Problem: Skin/Tissue Integrity - Adult  Goal: Incisions, wounds, or drain sites healing without S/S of infection  Outcome: Progressing  Flowsheets (Taken 6/22/2023 0911)  Incisions, Wounds, or Drain Sites Healing Without Sign and Symptoms of Infection: TWICE DAILY: Assess and document skin integrity     Problem: Infection - Adult  Goal: Absence of infection during hospitalization  Outcome: Progressing  Flowsheets (Taken 6/22/2023 0911)  Absence of infection during hospitalization:   Assess and monitor for signs and symptoms of infection   Monitor lab/diagnostic results   Monitor all

## 2023-06-22 NOTE — PLAN OF CARE
Problem: Discharge Planning  Goal: Discharge to home or other facility with appropriate resources  6/22/2023 0002 by Rolly Allen RN  Outcome: Progressing  Flowsheets (Taken 6/22/2023 0002)  Discharge to home or other facility with appropriate resources:   Identify barriers to discharge with patient and caregiver   Identify discharge learning needs (meds, wound care, etc)   Refer to discharge planning if patient needs post-hospital services based on physician order or complex needs related to functional status, cognitive ability or social support system   Arrange for needed discharge resources and transportation as appropriate     Problem: Pain  Goal: Verbalizes/displays adequate comfort level or baseline comfort level  6/22/2023 0002 by Rolly Allen RN  Outcome: Progressing  Flowsheets (Taken 6/22/2023 0002)  Verbalizes/displays adequate comfort level or baseline comfort level:   Administer analgesics based on type and severity of pain and evaluate response   Encourage patient to monitor pain and request assistance   Assess pain using appropriate pain scale   Implement non-pharmacological measures as appropriate and evaluate response   Care plan reviewed with patient. Patient verbalized understanding of the plan of care and contribute to goal setting.

## 2023-06-25 DIAGNOSIS — J35.1 TONSILLAR HYPERTROPHY: ICD-10-CM

## 2023-06-25 DIAGNOSIS — J35.01 CHRONIC TONSILLITIS: ICD-10-CM

## 2023-06-27 ENCOUNTER — TELEPHONE (OUTPATIENT)
Dept: ENT CLINIC | Age: 23
End: 2023-06-27

## 2023-06-27 DIAGNOSIS — G89.18 ACUTE POST-OPERATIVE PAIN: Primary | ICD-10-CM

## 2023-07-25 ENCOUNTER — OFFICE VISIT (OUTPATIENT)
Dept: FAMILY MEDICINE CLINIC | Age: 23
End: 2023-07-25
Payer: COMMERCIAL

## 2023-07-25 VITALS
SYSTOLIC BLOOD PRESSURE: 124 MMHG | TEMPERATURE: 99 F | HEART RATE: 117 BPM | DIASTOLIC BLOOD PRESSURE: 68 MMHG | BODY MASS INDEX: 49.72 KG/M2 | WEIGHT: 293 LBS | RESPIRATION RATE: 16 BRPM | OXYGEN SATURATION: 96 %

## 2023-07-25 DIAGNOSIS — F32.2 MDD (MAJOR DEPRESSIVE DISORDER), SINGLE EPISODE, SEVERE , NO PSYCHOSIS (HCC): Primary | ICD-10-CM

## 2023-07-25 DIAGNOSIS — F41.9 ANXIETY: ICD-10-CM

## 2023-07-25 DIAGNOSIS — J02.9 EXUDATIVE PHARYNGITIS: ICD-10-CM

## 2023-07-25 PROCEDURE — 99213 OFFICE O/P EST LOW 20 MIN: CPT | Performed by: FAMILY MEDICINE

## 2023-07-25 RX ORDER — BUSPIRONE HYDROCHLORIDE 10 MG/1
10 TABLET ORAL 2 TIMES DAILY
Qty: 60 TABLET | Refills: 2 | Status: SHIPPED | OUTPATIENT
Start: 2023-07-25

## 2023-07-25 RX ORDER — AMOXICILLIN 500 MG/1
500 CAPSULE ORAL 3 TIMES DAILY
Qty: 30 CAPSULE | Refills: 0 | Status: SHIPPED | OUTPATIENT
Start: 2023-07-25 | End: 2023-08-04

## 2023-07-26 ASSESSMENT — ENCOUNTER SYMPTOMS
EYES NEGATIVE: 1
BACK PAIN: 0
COUGH: 0
RHINORRHEA: 0
SHORTNESS OF BREATH: 0
DIARRHEA: 0
NAUSEA: 0
SORE THROAT: 1
ABDOMINAL PAIN: 0
CHEST TIGHTNESS: 0
VOMITING: 0

## 2023-07-26 NOTE — PROGRESS NOTES
[address] MEDICINE  [address] [facility] [address]  Dept: [phone]  Dept Fax: [phone]  Loc: [phone]  PROGRESS NOTE      VisitDate: 7/25/2023    Cory Watkins is a 20 y.o. female who presents today for:  Chief Complaint   Patient presents with    Discuss Medications     Discuss increasing buspar    URI     Symptoms x2 days, HA, ST, bilateral otalgia, 100.6 temp this morning, taking tylenol at home       Impression/Plan:  1. MDD (major depressive disorder), single episode, severe , no psychosis ([address])    2. Anxiety    3. Exudative pharyngitis      Requested Prescriptions     Signed Prescriptions Disp Refills    amoxicillin (AMOXIL) 500 MG capsule 30 capsule 0     Sig: Take 1 capsule by mouth 3 times daily for 10 days    busPIRone (BUSPAR) 10 MG tablet 60 tablet 2     Sig: Take 1 tablet by mouth 2 times daily     No orders of the defined types were placed in this encounter. Subjective:  HPI  Patient comes in follow-up depression. Feels BuSpar is helpful but could be more helpful. No negative side effects. Doing well with medications otherwise. Also reports sore throat low-grade fever no chills no vomiting or diarrhea mild cough no upper respiratory congestion    Review of Systems   Constitutional:  Negative for activity change, appetite change, fatigue and fever. HENT:  Positive for sore throat. Negative for congestion and rhinorrhea. Eyes: Negative. Respiratory:  Negative for cough, chest tightness and shortness of breath. Cardiovascular:  Negative for chest pain and palpitations. Gastrointestinal:  Negative for abdominal pain, diarrhea, nausea and vomiting. Genitourinary:  Negative for dysuria and urgency. Musculoskeletal:  Negative for arthralgias and back pain. Neurological:  Negative for dizziness and headaches. Psychiatric/Behavioral:  Positive for dysphoric mood. The patient is not nervous/anxious.

## 2023-08-01 ENCOUNTER — OFFICE VISIT (OUTPATIENT)
Dept: ENT CLINIC | Age: 23
End: 2023-08-01

## 2023-08-01 VITALS
OXYGEN SATURATION: 99 % | DIASTOLIC BLOOD PRESSURE: 78 MMHG | WEIGHT: 293 LBS | RESPIRATION RATE: 18 BRPM | SYSTOLIC BLOOD PRESSURE: 128 MMHG | HEIGHT: 68 IN | HEART RATE: 87 BPM | BODY MASS INDEX: 44.41 KG/M2 | TEMPERATURE: 97.9 F

## 2023-08-01 DIAGNOSIS — Z90.89 S/P TONSILLECTOMY: Primary | ICD-10-CM

## 2023-08-01 DIAGNOSIS — H93.8X1 SENSATION OF FULLNESS IN RIGHT EAR: ICD-10-CM

## 2023-08-01 DIAGNOSIS — R09.81 NASAL CONGESTION: ICD-10-CM

## 2023-08-01 DIAGNOSIS — J34.89 RHINORRHEA: ICD-10-CM

## 2023-08-01 PROCEDURE — 99024 POSTOP FOLLOW-UP VISIT: CPT | Performed by: PHYSICIAN ASSISTANT

## 2023-08-07 ENCOUNTER — OFFICE VISIT (OUTPATIENT)
Dept: FAMILY MEDICINE CLINIC | Age: 23
End: 2023-08-07
Payer: COMMERCIAL

## 2023-08-07 VITALS
OXYGEN SATURATION: 100 % | SYSTOLIC BLOOD PRESSURE: 120 MMHG | WEIGHT: 293 LBS | BODY MASS INDEX: 43.4 KG/M2 | TEMPERATURE: 98.2 F | HEIGHT: 69 IN | HEART RATE: 86 BPM | RESPIRATION RATE: 14 BRPM | DIASTOLIC BLOOD PRESSURE: 84 MMHG

## 2023-08-07 DIAGNOSIS — J02.9 SORE THROAT: ICD-10-CM

## 2023-08-07 DIAGNOSIS — J01.90 ACUTE SINUSITIS, RECURRENCE NOT SPECIFIED, UNSPECIFIED LOCATION: Primary | ICD-10-CM

## 2023-08-07 PROCEDURE — 99213 OFFICE O/P EST LOW 20 MIN: CPT | Performed by: FAMILY MEDICINE

## 2023-08-07 RX ORDER — AZITHROMYCIN 500 MG/1
500 TABLET, FILM COATED ORAL DAILY
Qty: 3 TABLET | Refills: 0 | Status: SHIPPED | OUTPATIENT
Start: 2023-08-07 | End: 2023-08-10

## 2023-08-13 ASSESSMENT — ENCOUNTER SYMPTOMS
DIARRHEA: 0
CHEST TIGHTNESS: 0
SORE THROAT: 1
SINUS PRESSURE: 1
ABDOMINAL PAIN: 0
EYES NEGATIVE: 1
NAUSEA: 0
BACK PAIN: 0
VOMITING: 0
COUGH: 1
SHORTNESS OF BREATH: 0
SINUS PAIN: 1
RHINORRHEA: 0

## 2023-08-16 ENCOUNTER — APPOINTMENT (OUTPATIENT)
Dept: CT IMAGING | Age: 23
End: 2023-08-16
Payer: OTHER MISCELLANEOUS

## 2023-08-16 ENCOUNTER — HOSPITAL ENCOUNTER (EMERGENCY)
Age: 23
Discharge: HOME | End: 2023-08-17
Payer: OTHER MISCELLANEOUS

## 2023-08-16 DIAGNOSIS — S16.1XXA ACUTE STRAIN OF NECK MUSCLE, INITIAL ENCOUNTER: ICD-10-CM
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
## 2023-08-16 PROCEDURE — 99284 EMERGENCY DEPT VISIT MOD MDM: CPT

## 2023-08-16 PROCEDURE — 6370000000 HC RX 637 (ALT 250 FOR IP): Performed by: NURSE PRACTITIONER

## 2023-08-16 PROCEDURE — 96372 THER/PROPH/DIAG INJ SC/IM: CPT

## 2023-08-16 PROCEDURE — 72125 CT NECK SPINE W/O DYE: CPT

## 2023-08-16 PROCEDURE — 6360000002 HC RX W HCPCS: Performed by: NURSE PRACTITIONER

## 2023-08-16 RX ORDER — KETOROLAC TROMETHAMINE 30 MG/ML
30 INJECTION, SOLUTION INTRAMUSCULAR; INTRAVENOUS ONCE
Status: COMPLETED | OUTPATIENT
Start: 2023-08-16 | End: 2023-08-16

## 2023-08-16 RX ORDER — CYCLOBENZAPRINE HCL 10 MG
10 TABLET ORAL ONCE
Status: COMPLETED | OUTPATIENT
Start: 2023-08-16 | End: 2023-08-16

## 2023-08-16 RX ORDER — LIDOCAINE 4 G/G
1 PATCH TOPICAL ONCE
Status: DISCONTINUED | OUTPATIENT
Start: 2023-08-16 | End: 2023-08-17 | Stop reason: HOSPADM

## 2023-08-16 RX ADMIN — CYCLOBENZAPRINE 10 MG: 10 TABLET, FILM COATED ORAL at 22:56

## 2023-08-16 RX ADMIN — KETOROLAC TROMETHAMINE 30 MG: 30 INJECTION, SOLUTION INTRAMUSCULAR; INTRAVENOUS at 22:56

## 2023-08-16 ASSESSMENT — PAIN DESCRIPTION - LOCATION: LOCATION: NECK

## 2023-08-16 ASSESSMENT — PAIN SCALES - GENERAL: PAINLEVEL_OUTOF10: 8

## 2023-08-16 ASSESSMENT — PAIN - FUNCTIONAL ASSESSMENT: PAIN_FUNCTIONAL_ASSESSMENT: 0-10

## 2023-08-17 ENCOUNTER — OFFICE VISIT (OUTPATIENT)
Dept: FAMILY MEDICINE CLINIC | Age: 23
End: 2023-08-17

## 2023-08-17 VITALS
RESPIRATION RATE: 18 BRPM | SYSTOLIC BLOOD PRESSURE: 118 MMHG | WEIGHT: 293 LBS | BODY MASS INDEX: 44.41 KG/M2 | OXYGEN SATURATION: 96 % | TEMPERATURE: 98.3 F | DIASTOLIC BLOOD PRESSURE: 74 MMHG | HEIGHT: 68 IN | HEART RATE: 63 BPM

## 2023-08-17 VITALS
DIASTOLIC BLOOD PRESSURE: 84 MMHG | TEMPERATURE: 98.7 F | WEIGHT: 293 LBS | BODY MASS INDEX: 44.41 KG/M2 | SYSTOLIC BLOOD PRESSURE: 122 MMHG | OXYGEN SATURATION: 95 % | HEART RATE: 79 BPM | RESPIRATION RATE: 16 BRPM | HEIGHT: 68 IN
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
## 2023-08-17 DIAGNOSIS — S16.1XXD STRAIN OF NECK MUSCLE, SUBSEQUENT ENCOUNTER: ICD-10-CM

## 2023-08-17 PROCEDURE — 6370000000 HC RX 637 (ALT 250 FOR IP): Performed by: NURSE PRACTITIONER

## 2023-08-17 RX ORDER — CYCLOBENZAPRINE HCL 10 MG
10 TABLET ORAL 3 TIMES DAILY PRN
Qty: 30 TABLET | Refills: 0 | Status: SHIPPED | OUTPATIENT
Start: 2023-08-17 | End: 2023-08-27

## 2023-08-17 RX ORDER — PREDNISONE 10 MG/1
TABLET ORAL
Qty: 30 TABLET | Refills: 0 | Status: SHIPPED | OUTPATIENT
Start: 2023-08-17 | End: 2023-08-27

## 2023-08-17 RX ORDER — TRAMADOL HYDROCHLORIDE 50 MG/1
50 TABLET ORAL EVERY 6 HOURS PRN
Qty: 10 TABLET | Refills: 0 | Status: SHIPPED | OUTPATIENT
Start: 2023-08-17 | End: 2023-08-20

## 2023-08-17 RX ORDER — OXYCODONE HYDROCHLORIDE AND ACETAMINOPHEN 5; 325 MG/1; MG/1
1 TABLET ORAL ONCE
Status: COMPLETED | OUTPATIENT
Start: 2023-08-17 | End: 2023-08-17

## 2023-08-17 RX ADMIN — OXYCODONE AND ACETAMINOPHEN 1 TABLET: 5; 325 TABLET ORAL at 00:42
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
## 2023-08-17 NOTE — ED NOTES
Pt to ED room 8 in stable condition. Vs reassessed. RR easy and unlabored.  Pt medicated per [facility] and updated on 0717 Ricky Livingston, RN  08/16/23 4964
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
## 2023-08-20 ASSESSMENT — ENCOUNTER SYMPTOMS
SORE THROAT: 0
RHINORRHEA: 0
BACK PAIN: 0
SHORTNESS OF BREATH: 0
DIARRHEA: 0
VOMITING: 0
EYES NEGATIVE: 1
COUGH: 0
NAUSEA: 0
ABDOMINAL PAIN: 0
CHEST TIGHTNESS: 0

## 2023-09-19 RX ORDER — OMEPRAZOLE 40 MG/1
CAPSULE, DELAYED RELEASE ORAL
Qty: 90 CAPSULE | Refills: 2 | Status: SHIPPED | OUTPATIENT
Start: 2023-09-19

## 2023-10-11 ENCOUNTER — OFFICE VISIT (OUTPATIENT)
Dept: FAMILY MEDICINE CLINIC | Age: 23
End: 2023-10-11
Payer: COMMERCIAL

## 2023-10-11 VITALS
DIASTOLIC BLOOD PRESSURE: 68 MMHG | SYSTOLIC BLOOD PRESSURE: 122 MMHG | RESPIRATION RATE: 16 BRPM | BODY MASS INDEX: 52.15 KG/M2 | WEIGHT: 293 LBS | OXYGEN SATURATION: 95 % | TEMPERATURE: 98.5 F | HEART RATE: 92 BPM

## 2023-10-11 DIAGNOSIS — Z00.00 ROUTINE GENERAL MEDICAL EXAMINATION AT A HEALTH CARE FACILITY: ICD-10-CM

## 2023-10-11 DIAGNOSIS — H92.09 OTALGIA, UNSPECIFIED LATERALITY: Primary | ICD-10-CM

## 2023-10-11 DIAGNOSIS — N92.6 IRREGULAR PERIODS/MENSTRUAL CYCLES: ICD-10-CM

## 2023-10-11 LAB
AVERAGE GLUCOSE: NORMAL
CHOLESTEROL, TOTAL: 163 MG/DL
CHOLESTEROL/HDL RATIO: NORMAL
HBA1C MFR BLD: 5.7 %
HDLC SERPL-MCNC: 36 MG/DL (ref 35–70)
LDL CHOLESTEROL CALCULATED: 108 MG/DL (ref 0–160)
NONHDLC SERPL-MCNC: NORMAL MG/DL
TRIGL SERPL-MCNC: 102 MG/DL
VLDLC SERPL CALC-MCNC: 19 MG/DL

## 2023-10-11 PROCEDURE — 99213 OFFICE O/P EST LOW 20 MIN: CPT | Performed by: FAMILY MEDICINE

## 2023-10-11 PROCEDURE — 90471 IMMUNIZATION ADMIN: CPT | Performed by: FAMILY MEDICINE

## 2023-10-11 PROCEDURE — 90674 CCIIV4 VAC NO PRSV 0.5 ML IM: CPT | Performed by: FAMILY MEDICINE

## 2023-10-11 RX ORDER — DUPILUMAB 300 MG/2ML
300 INJECTION, SOLUTION SUBCUTANEOUS
COMMUNITY
Start: 2023-10-02

## 2023-10-12 LAB — T4 FREE: 1.23

## 2023-10-16 RX ORDER — ESCITALOPRAM OXALATE 20 MG/1
20 TABLET ORAL NIGHTLY
Qty: 90 TABLET | Refills: 2 | OUTPATIENT
Start: 2023-10-16

## 2023-10-16 RX ORDER — ESCITALOPRAM OXALATE 20 MG/1
TABLET ORAL
Qty: 90 TABLET | Refills: 3 | Status: SHIPPED | OUTPATIENT
Start: 2023-10-16

## 2023-10-16 RX ORDER — ESCITALOPRAM OXALATE 20 MG/1
TABLET ORAL
Qty: 90 TABLET | Refills: 0 | OUTPATIENT
Start: 2023-10-16

## 2023-10-20 ASSESSMENT — ENCOUNTER SYMPTOMS
NAUSEA: 0
BACK PAIN: 0
EYES NEGATIVE: 1
DIARRHEA: 0
VOMITING: 0
RHINORRHEA: 0
ABDOMINAL PAIN: 0
SORE THROAT: 0
CHEST TIGHTNESS: 0
COUGH: 0
SHORTNESS OF BREATH: 0

## 2023-10-20 NOTE — PROGRESS NOTES
Immunizations Administered       Name Date Dose Route    Influenza, FLUCELVAX, (age 10 mo+), MDCK, PF, 0.5mL 10/11/2023 0.5 mL Intramuscular    Site: Deltoid- Left    Lot: [id number]    NDC: 18716-051-04
Date    TONSILLECTOMY AND ADENOIDECTOMY N/A 6/21/2023    TONSILLECTOMY performed by Roshni Quinn MD at [address] EXTRACTION       Family History   Problem Relation Age of Onset    Diabetes Mother     Asthma Paternal Grandmother     Cancer Paternal Grandfather     Asthma Paternal Grandfather     Depression Father     Allergy (Severe) Father     Colon Cancer Paternal Aunt     Cancer Paternal Aunt         Ovarian    Depression Paternal Uncle      Social History     Tobacco Use    Smoking status: Never    Smokeless tobacco: Never   Substance Use Topics    Alcohol use: Not Currently      Current Outpatient Medications   Medication Sig Dispense Refill    escitalopram (LEXAPRO) 20 MG tablet TAKE 1 TABLET BY MOUTH EVERY DAY AT NIGHT 90 tablet 3    DUPIXENT 300 MG/2ML SOSY injection 2 mLs every 14 days      omeprazole (PRILOSEC) 40 MG delayed release capsule TAKE 1 CAPSULE BY MOUTH EVERY DAY 90 capsule 2    busPIRone (BUSPAR) 10 MG tablet Take 1 tablet by mouth 2 times daily 60 tablet 2     No current facility-administered medications for this visit.      Allergies   Allergen Reactions    Hydroxyzine Hcl Other (See Comments)     Panic attack 1 time, pt tolerates otherwise     Health Maintenance   Topic Date Due    HPV vaccine (3 - 2-dose series) 04/08/2011    HIV screen  Never done    Chlamydia/GC screen  Never done    Hepatitis C screen  Never done    Pap smear  Never done    COVID-19 Vaccine (3 - Booster for Farideh series) 08/10/2022    Depression Monitoring  01/19/2024    A1C test (Diabetic or Prediabetic)  10/11/2024    DTaP/Tdap/Td vaccine (8 - Td or Tdap) 08/01/2027    Hepatitis B vaccine  Completed    Hib vaccine  Completed    Varicella vaccine  Completed    Meningococcal (ACWY) vaccine  Completed    Flu vaccine  Completed    Hepatitis A vaccine  Aged Out    Pneumococcal 0-64 years Vaccine  Aged Out    Depression Screen  Discontinued         Objective:

## 2023-11-15 ENCOUNTER — OFFICE VISIT (OUTPATIENT)
Dept: FAMILY MEDICINE CLINIC | Age: 23
End: 2023-11-15

## 2023-11-15 VITALS
WEIGHT: 293 LBS | BODY MASS INDEX: 52.15 KG/M2 | DIASTOLIC BLOOD PRESSURE: 78 MMHG | HEART RATE: 62 BPM | TEMPERATURE: 98.3 F | SYSTOLIC BLOOD PRESSURE: 122 MMHG | OXYGEN SATURATION: 97 %

## 2023-11-15 DIAGNOSIS — Z33.1 PREGNANCY AS INCIDENTAL FINDING: ICD-10-CM

## 2023-11-15 DIAGNOSIS — J06.9 VIRAL URI: Primary | ICD-10-CM

## 2023-11-15 RX ORDER — SULFAMETHOXAZOLE AND TRIMETHOPRIM 800; 160 MG/1; MG/1
TABLET ORAL
COMMUNITY
Start: 2023-11-13

## 2023-11-15 RX ORDER — FOLIC ACID 1 MG/1
TABLET ORAL
COMMUNITY
Start: 2023-11-13

## 2023-11-15 RX ORDER — SULFAMETHOXAZOLE AND TRIMETHOPRIM 800; 160 MG/1; MG/1
TABLET ORAL
COMMUNITY
Start: 2023-11-14 | End: 2023-11-15 | Stop reason: SDUPTHER

## 2023-11-19 ASSESSMENT — ENCOUNTER SYMPTOMS
BACK PAIN: 0
SHORTNESS OF BREATH: 0
VOMITING: 0
NAUSEA: 0
SORE THROAT: 1
EYES NEGATIVE: 1
COUGH: 0
DIARRHEA: 0
ABDOMINAL PAIN: 0
CHEST TIGHTNESS: 0
RHINORRHEA: 1

## 2023-11-30 ENCOUNTER — HOSPITAL ENCOUNTER (EMERGENCY)
Age: 23
Discharge: HOME | End: 2023-11-30
Attending: EMERGENCY MEDICINE
Payer: COMMERCIAL

## 2023-11-30 ENCOUNTER — APPOINTMENT (OUTPATIENT)
Dept: ULTRASOUND IMAGING | Age: 23
End: 2023-11-30
Payer: COMMERCIAL

## 2023-11-30 VITALS
RESPIRATION RATE: 20 BRPM | TEMPERATURE: 97.5 F | OXYGEN SATURATION: 95 % | DIASTOLIC BLOOD PRESSURE: 90 MMHG | SYSTOLIC BLOOD PRESSURE: 156 MMHG | HEART RATE: 82 BPM

## 2023-11-30 DIAGNOSIS — O03.9 MISCARRIAGE: Primary | ICD-10-CM

## 2023-11-30 DIAGNOSIS — R10.30 LOWER ABDOMINAL PAIN: ICD-10-CM

## 2023-11-30 DIAGNOSIS — N93.9 VAGINAL BLEEDING: ICD-10-CM

## 2023-11-30 LAB
ABO: NORMAL
ALBUMIN SERPL BCG-MCNC: 4.2 G/DL (ref 3.5–5.1)
ALP SERPL-CCNC: 91 U/L (ref 38–126)
ALT SERPL W/O P-5'-P-CCNC: 25 U/L (ref 11–66)
ANION GAP SERPL CALC-SCNC: 15 MEQ/L (ref 8–16)
ANTIBODY SCREEN: NORMAL
AST SERPL-CCNC: 23 U/L (ref 5–40)
BASOPHILS ABSOLUTE: 0 THOU/MM3 (ref 0–0.1)
BASOPHILS NFR BLD AUTO: 0.4 %
BILIRUB CONJ SERPL-MCNC: < 0.2 MG/DL (ref 0–0.3)
BILIRUB SERPL-MCNC: 0.2 MG/DL (ref 0.3–1.2)
BUN SERPL-MCNC: 10 MG/DL (ref 7–22)
CALCIUM SERPL-MCNC: 9.2 MG/DL (ref 8.5–10.5)
CHLORIDE SERPL-SCNC: 104 MEQ/L (ref 98–111)
CO2 SERPL-SCNC: 21 MEQ/L (ref 23–33)
CREAT SERPL-MCNC: 0.8 MG/DL (ref 0.4–1.2)
DEPRECATED RDW RBC AUTO: 43.2 FL (ref 35–45)
EOSINOPHIL NFR BLD AUTO: 2.6 %
EOSINOPHILS ABSOLUTE: 0.3 THOU/MM3 (ref 0–0.4)
ERYTHROCYTE [DISTWIDTH] IN BLOOD BY AUTOMATED COUNT: 13.7 % (ref 11.5–14.5)
GA (WEEKS): NORMAL WK
GFR SERPL CREATININE-BSD FRML MDRD: > 60 ML/MIN/1.73M2
GLUCOSE SERPL-MCNC: 119 MG/DL (ref 70–108)
HCG INTACT+B SERPL-ACNC: 3282 MIU/ML (ref 0–5)
HCT VFR BLD AUTO: 42.2 % (ref 37–47)
HGB BLD-MCNC: 12.9 GM/DL (ref 12–16)
IMM GRANULOCYTES # BLD AUTO: 0.02 THOU/MM3 (ref 0–0.07)
IMM GRANULOCYTES NFR BLD AUTO: 0.2 %
LIPASE SERPL-CCNC: 27.5 U/L (ref 5.6–51.3)
LYMPHOCYTES ABSOLUTE: 4.1 THOU/MM3 (ref 1–4.8)
LYMPHOCYTES NFR BLD AUTO: 38.9 %
MAGNESIUM SERPL-MCNC: 1.9 MG/DL (ref 1.6–2.4)
MCH RBC QN AUTO: 26.8 PG (ref 26–33)
MCHC RBC AUTO-ENTMCNC: 30.6 GM/DL (ref 32.2–35.5)
MCV RBC AUTO: 87.7 FL (ref 81–99)
MONOCYTES ABSOLUTE: 0.7 THOU/MM3 (ref 0.4–1.3)
MONOCYTES NFR BLD AUTO: 6.3 %
NEUTROPHILS NFR BLD AUTO: 51.6 %
NRBC BLD AUTO-RTO: 0 /100 WBC
OSMOLALITY SERPL CALC.SUM OF ELEC: 279.6 MOSMOL/KG (ref 275–300)
PLATELET # BLD AUTO: 280 THOU/MM3 (ref 130–400)
PMV BLD AUTO: 9.5 FL (ref 9.4–12.4)
POTASSIUM SERPL-SCNC: 3.6 MEQ/L (ref 3.5–5.2)
PROT SERPL-MCNC: 8.2 G/DL (ref 6.1–8)
RBC # BLD AUTO: 4.81 MILL/MM3 (ref 4.2–5.4)
RH FACTOR: NORMAL
SEGMENTED NEUTROPHILS ABSOLUTE COUNT: 5.5 THOU/MM3 (ref 1.8–7.7)
SODIUM SERPL-SCNC: 140 MEQ/L (ref 135–145)
TSH SERPL DL<=0.005 MIU/L-ACNC: 1.81 UIU/ML (ref 0.4–4.2)
WBC # BLD AUTO: 10.6 THOU/MM3 (ref 4.8–10.8)

## 2023-11-30 PROCEDURE — 83690 ASSAY OF LIPASE: CPT

## 2023-11-30 PROCEDURE — 99284 EMERGENCY DEPT VISIT MOD MDM: CPT

## 2023-11-30 PROCEDURE — 85025 COMPLETE CBC W/AUTO DIFF WBC: CPT

## 2023-11-30 PROCEDURE — 6360000002 HC RX W HCPCS

## 2023-11-30 PROCEDURE — 86901 BLOOD TYPING SEROLOGIC RH(D): CPT

## 2023-11-30 PROCEDURE — 86900 BLOOD TYPING SEROLOGIC ABO: CPT

## 2023-11-30 PROCEDURE — 84702 CHORIONIC GONADOTROPIN TEST: CPT

## 2023-11-30 PROCEDURE — 96372 THER/PROPH/DIAG INJ SC/IM: CPT

## 2023-11-30 PROCEDURE — 86850 RBC ANTIBODY SCREEN: CPT

## 2023-11-30 PROCEDURE — 36415 COLL VENOUS BLD VENIPUNCTURE: CPT

## 2023-11-30 PROCEDURE — 80053 COMPREHEN METABOLIC PANEL: CPT

## 2023-11-30 PROCEDURE — 84443 ASSAY THYROID STIM HORMONE: CPT

## 2023-11-30 PROCEDURE — 82248 BILIRUBIN DIRECT: CPT

## 2023-11-30 PROCEDURE — 76817 TRANSVAGINAL US OBSTETRIC: CPT

## 2023-11-30 PROCEDURE — 6370000000 HC RX 637 (ALT 250 FOR IP)

## 2023-11-30 PROCEDURE — 83735 ASSAY OF MAGNESIUM: CPT

## 2023-11-30 RX ORDER — HYDROCODONE BITARTRATE AND ACETAMINOPHEN 5; 325 MG/1; MG/1
1 TABLET ORAL EVERY 6 HOURS PRN
Qty: 12 TABLET | Refills: 0 | Status: SHIPPED | OUTPATIENT
Start: 2023-11-30 | End: 2023-12-03

## 2023-11-30 RX ORDER — ONDANSETRON 4 MG/1
4 TABLET, ORALLY DISINTEGRATING ORAL ONCE
Status: COMPLETED | OUTPATIENT
Start: 2023-11-30 | End: 2023-11-30

## 2023-11-30 RX ORDER — HYDROCODONE BITARTRATE AND ACETAMINOPHEN 5; 325 MG/1; MG/1
1 TABLET ORAL EVERY 6 HOURS PRN
Status: DISCONTINUED | OUTPATIENT
Start: 2023-11-30 | End: 2023-12-01 | Stop reason: HOSPADM

## 2023-11-30 RX ADMIN — HUMAN RHO(D) IMMUNE GLOBULIN 300 MCG: 300 INJECTION, SOLUTION INTRAMUSCULAR at 23:08

## 2023-11-30 RX ADMIN — ONDANSETRON 4 MG: 4 TABLET, ORALLY DISINTEGRATING ORAL at 20:21

## 2023-11-30 RX ADMIN — HYDROCODONE BITARTRATE AND ACETAMINOPHEN 1 TABLET: 5; 325 TABLET ORAL at 22:28

## 2023-11-30 ASSESSMENT — PAIN SCALES - GENERAL: PAINLEVEL_OUTOF10: 6

## 2023-11-30 ASSESSMENT — ENCOUNTER SYMPTOMS
CONSTIPATION: 0
NAUSEA: 0
ABDOMINAL PAIN: 1
CHEST TIGHTNESS: 0
DIARRHEA: 0
SHORTNESS OF BREATH: 0
VOMITING: 0

## 2023-11-30 ASSESSMENT — PAIN DESCRIPTION - LOCATION: LOCATION: ABDOMEN

## 2023-11-30 ASSESSMENT — PAIN - FUNCTIONAL ASSESSMENT: PAIN_FUNCTIONAL_ASSESSMENT: NONE - DENIES PAIN

## 2023-12-01 NOTE — ED NOTES
Pt unable to give a urine sample at this time. Pt states feeling nauseous, provider notified.       Megan Johnson RN  11/30/23 2012

## 2023-12-01 NOTE — ED NOTES
PT resting in bed. PT and VS assessed. Family at bedside. PT and family updated on plan of care. Call light in reach.      Jacinta Schaefer RN  11/30/23 7460

## 2023-12-01 NOTE — ED PROVIDER NOTES
[facility] DEPT  EMERGENCY DEPARTMENT ENCOUNTER          Pt Name: Catherine Shields  MRN: [id number]  [address] [facility] 2000  Date of evaluation: 2023  Physician: Elton Pires DO  Supervising Attending Physician: Rj Flores DO       CHIEF COMPLAINT       Chief Complaint   Patient presents with    Vaginal Bleeding         HISTORY OF PRESENT ILLNESS    HPI  Catherine Shields is a 24 y.o. female , last menstrual period 2023 who presents to the emergency department from home, by private vehicle for evaluation of vaginal bleeding. Patient states about 50 minutes ago she was sitting at home eating dinner when she felt a gush of blood from her vagina ambulate came to the ED. Patient states her last menstrual period was 2023 and pregnancy test was positive on . Patient is planning on seeing [facility] however has an appointment next week but has not established there yet. Patient denies any trauma to the abdomen, no other vaginal discharge, no dysuria, frequency, urgency. She describes about having a total vaginal bleeding about 2 cups of blood. The patient has no other acute complaints at this time. REVIEW OF SYSTEMS   Review of Systems   Constitutional:  Negative for activity change, appetite change, chills, diaphoresis, fatigue and fever. Respiratory:  Negative for chest tightness and shortness of breath. Cardiovascular:  Negative for chest pain. Gastrointestinal:  Positive for abdominal pain (mild abdominal cramping). Negative for constipation, diarrhea, nausea and vomiting. Genitourinary:  Positive for pelvic pain (lower abdominal cramping) and vaginal bleeding. Negative for dysuria, frequency, hematuria, urgency, vaginal discharge and vaginal pain. Neurological:  Positive for light-headedness.          PAST MEDICAL AND SURGICAL HISTORY     Past Medical History:   Diagnosis Date    Anxiety     Eczema     Enlarged

## 2023-12-01 NOTE — ED NOTES
Report received from [facility]. RN to round on PT and PT at ultrasound at this time.      Jocelyn Armstrong RN  11/30/23 8730

## 2023-12-01 NOTE — ED NOTES
PT resting in bed after getting back from ultrasound. PT states she is unable to urinate at this time. RN notified PT of need for sample. PT and VS assessed. Family at bedside.  Call light in reach     Gerardo nair [facility], RN  11/30/23 0761

## 2023-12-01 NOTE — ED NOTES
Pt to ED c/o vaginal bleeding that started about 15 minutes ago. Pt states she is 9 weeks pregnant and has an OB appt scheduled. Pt states she was eating dinner when she experienced a large amount of blood. Pt states it is worse than her normal period. Pt anxiety and tearful in room. Pt denies any blood clots or abdominal pain/cramping. VSS. Pt given urine cup for specimen.       Emanuel Mcgraw RN  11/30/23 6052

## 2023-12-01 NOTE — DISCHARGE INSTRUCTIONS
If you are saturating 2 large pads in 1 hour or having increased vaginal bleeding, please come back to the ED for further evaluation.

## 2023-12-02 ENCOUNTER — NURSE ONLY (OUTPATIENT)
Dept: LAB | Age: 23
End: 2023-12-02

## 2023-12-02 DIAGNOSIS — N93.9 VAGINAL BLEEDING: ICD-10-CM

## 2023-12-02 DIAGNOSIS — O03.9 MISCARRIAGE: ICD-10-CM

## 2023-12-02 LAB — HCG INTACT+B SERPL-ACNC: 515.7 MIU/ML (ref 0–5)

## 2023-12-05 RX ORDER — BUSPIRONE HYDROCHLORIDE 10 MG/1
10 TABLET ORAL 2 TIMES DAILY
Qty: 60 TABLET | Refills: 0 | OUTPATIENT
Start: 2023-12-05

## 2023-12-05 RX ORDER — BUSPIRONE HYDROCHLORIDE 10 MG/1
10 TABLET ORAL 2 TIMES DAILY
Qty: 180 TABLET | Refills: 3 | Status: SHIPPED | OUTPATIENT
Start: 2023-12-05

## 2023-12-06 RX ORDER — BUSPIRONE HYDROCHLORIDE 10 MG/1
10 TABLET ORAL 2 TIMES DAILY
Qty: 60 TABLET | Refills: 0 | OUTPATIENT
Start: 2023-12-06

## 2024-02-02 ENCOUNTER — HOSPITAL ENCOUNTER (EMERGENCY)
Age: 24
Discharge: HOME | End: 2024-02-02
Payer: COMMERCIAL

## 2024-02-02 VITALS
DIASTOLIC BLOOD PRESSURE: 60 MMHG | SYSTOLIC BLOOD PRESSURE: 118 MMHG | WEIGHT: 293 LBS | TEMPERATURE: 98.2 F | HEART RATE: 80 BPM | HEIGHT: 68 IN | RESPIRATION RATE: 16 BRPM | BODY MASS INDEX: 44.41 KG/M2 | OXYGEN SATURATION: 97 %

## 2024-02-02 DIAGNOSIS — H60.8X9: Primary | ICD-10-CM

## 2024-02-02 PROCEDURE — 99213 OFFICE O/P EST LOW 20 MIN: CPT

## 2024-02-02 PROCEDURE — 99203 OFFICE O/P NEW LOW 30 MIN: CPT | Performed by: NURSE PRACTITIONER

## 2024-02-02 RX ORDER — PSEUDOEPHEDRINE HCL 30 MG
30 TABLET ORAL EVERY 4 HOURS PRN
Qty: 30 TABLET | Refills: 0 | Status: SHIPPED | OUTPATIENT
Start: 2024-02-02 | End: 2024-02-07

## 2024-02-02 ASSESSMENT — ENCOUNTER SYMPTOMS
SHORTNESS OF BREATH: 0
DIARRHEA: 0
APNEA: 0
RHINORRHEA: 0
CHOKING: 0
SINUS PRESSURE: 0
STRIDOR: 0
CHEST TIGHTNESS: 0
SORE THROAT: 0
COUGH: 0
VOMITING: 0
ABDOMINAL PAIN: 0
WHEEZING: 0

## 2024-02-02 ASSESSMENT — PAIN DESCRIPTION - DESCRIPTORS: DESCRIPTORS: ACHING;SORE

## 2024-02-02 ASSESSMENT — PAIN - FUNCTIONAL ASSESSMENT
PAIN_FUNCTIONAL_ASSESSMENT: ACTIVITIES ARE NOT PREVENTED
PAIN_FUNCTIONAL_ASSESSMENT: 0-10

## 2024-02-02 ASSESSMENT — PAIN DESCRIPTION - PAIN TYPE: TYPE: ACUTE PAIN

## 2024-02-02 ASSESSMENT — PAIN DESCRIPTION - ORIENTATION: ORIENTATION: LEFT

## 2024-02-02 ASSESSMENT — PAIN DESCRIPTION - LOCATION: LOCATION: EAR

## 2024-02-02 ASSESSMENT — PAIN DESCRIPTION - ONSET: ONSET: GRADUAL

## 2024-02-02 ASSESSMENT — PAIN DESCRIPTION - FREQUENCY: FREQUENCY: CONTINUOUS

## 2024-02-02 ASSESSMENT — PAIN SCALES - GENERAL: PAINLEVEL_OUTOF10: 4

## 2024-02-02 NOTE — ED PROVIDER NOTES
[facility] URGENT CARE  Urgent Care Encounter      CHIEF COMPLAINT       Chief Complaint   Patient presents with    Otalgia     left       Nurses Notes reviewed and I agree except as noted in the HPI.  HISTORY OFPRESENT ILLNESS   Laura Moseley is a 23 y.o.  The history is provided by the patient. No  was used.   Ear Problem  Location:  Left  Behind ear:  No abnormality  Quality:  Unable to specify  Severity:  Moderate  Onset quality:  Sudden  Duration:  2 days  Timing:  Constant  Progression:  Unchanged  Chronicity:  New  Context: not direct blow, not elevation change, not foreign body in ear, not loud noise, not recent URI and not water in ear    Relieved by:  Nothing  Worsened by:  Nothing  Ineffective treatments:  None tried  Associated symptoms: no abdominal pain, no congestion, no cough, no diarrhea, no ear discharge, no fever, no headaches, no hearing loss, no neck pain, no rash, no rhinorrhea, no sore throat, no tinnitus and no vomiting    Risk factors: no recent travel, no chronic ear infection and no prior ear surgery        REVIEW OF SYSTEMS     Review of Systems   Constitutional:  Negative for activity change, appetite change, chills, diaphoresis, fatigue and fever.   HENT:  Positive for ear pain. Negative for congestion, ear discharge, hearing loss, postnasal drip, rhinorrhea, sinus pressure, sore throat and tinnitus.    Respiratory:  Negative for apnea, cough, choking, chest tightness, shortness of breath, wheezing and stridor.    Cardiovascular:  Negative for chest pain, palpitations and leg swelling.   Gastrointestinal:  Negative for abdominal pain, diarrhea and vomiting.   Musculoskeletal:  Negative for neck pain.   Skin:  Negative for rash.   Neurological:  Negative for headaches.       PAST MEDICAL HISTORY         Diagnosis Date    Anxiety     Eczema     Enlarged tonsils        SURGICAL HISTORY     Patient  has a past surgical history that includes [place] tooth  agreeable to the outpatient management at this time.  They are advised to follow-up with her primary care provider in 2-3 days for reevaluation.  The patient left ambulatory without any problems.      REFERRED TO:  No follow-up provider specified.  DISCHARGE MEDICATIONS:  Discharge Medication List as of 2/2/2024  5:07 PM        START taking these medications    Details   pseudoephedrine (SUDAFED) 30 MG tablet Take 1 tablet by mouth every 4 hours as needed for Congestion (earache), Disp-30 tablet, R-0Normal           Discharge Medication List as of 2/2/2024  5:07 PM          ERIKA Roldan CNP, Tawnya Rae, APRN - CNP  02/02/24 1715

## 2024-02-02 NOTE — ED TRIAGE NOTES
Pt to [facility] ambulatory with left ear pain.  Pain started yesterday.  No drainage noted from left ear.

## 2024-02-19 ENCOUNTER — NURSE ONLY (OUTPATIENT)
Dept: LAB | Age: 24
End: 2024-02-19

## 2024-02-29 LAB — CYTOLOGY THIN PREP PAP: NORMAL

## 2024-03-27 ENCOUNTER — NURSE ONLY (OUTPATIENT)
Dept: LAB | Age: 24
End: 2024-03-27

## 2024-03-27 LAB
ALBUMIN SERPL BCG-MCNC: 4.2 G/DL (ref 3.5–5.1)
ALP SERPL-CCNC: 99 U/L (ref 38–126)
ALT SERPL W/O P-5'-P-CCNC: 16 U/L (ref 11–66)
ANION GAP SERPL CALC-SCNC: 10 MEQ/L (ref 8–16)
AST SERPL-CCNC: 18 U/L (ref 5–40)
BASOPHILS ABSOLUTE: 0 THOU/MM3 (ref 0–0.1)
BASOPHILS NFR BLD AUTO: 0.2 %
BILIRUB SERPL-MCNC: 0.3 MG/DL (ref 0.3–1.2)
BUN SERPL-MCNC: 12 MG/DL (ref 7–22)
CALCIUM SERPL-MCNC: 8.8 MG/DL (ref 8.5–10.5)
CHLORIDE SERPL-SCNC: 103 MEQ/L (ref 98–111)
CO2 SERPL-SCNC: 23 MEQ/L (ref 23–33)
CREAT SERPL-MCNC: 0.8 MG/DL (ref 0.4–1.2)
CRP SERPL-MCNC: 0.6 MG/DL (ref 0–1)
DEPRECATED RDW RBC AUTO: 44.2 FL (ref 35–45)
EOSINOPHIL NFR BLD AUTO: 1.7 %
EOSINOPHILS ABSOLUTE: 0.1 THOU/MM3 (ref 0–0.4)
ERYTHROCYTE [DISTWIDTH] IN BLOOD BY AUTOMATED COUNT: 14.7 % (ref 11.5–14.5)
ERYTHROCYTE [SEDIMENTATION RATE] IN BLOOD BY WESTERGREN METHOD: 25 MM/HR (ref 0–20)
GFR SERPL CREATININE-BSD FRML MDRD: > 90 ML/MIN/1.73M2
GLUCOSE SERPL-MCNC: 83 MG/DL (ref 70–108)
HCT VFR BLD AUTO: 41.3 % (ref 37–47)
HGB BLD-MCNC: 13.1 GM/DL (ref 12–16)
IMM GRANULOCYTES # BLD AUTO: 0.02 THOU/MM3 (ref 0–0.07)
IMM GRANULOCYTES NFR BLD AUTO: 0.2 %
LYMPHOCYTES ABSOLUTE: 3 THOU/MM3 (ref 1–4.8)
LYMPHOCYTES NFR BLD AUTO: 37.1 %
MCH RBC QN AUTO: 26.4 PG (ref 26–33)
MCHC RBC AUTO-ENTMCNC: 31.7 GM/DL (ref 32.2–35.5)
MCV RBC AUTO: 83.3 FL (ref 81–99)
MONOCYTES ABSOLUTE: 0.5 THOU/MM3 (ref 0.4–1.3)
MONOCYTES NFR BLD AUTO: 6.1 %
NEUTROPHILS NFR BLD AUTO: 54.7 %
NRBC BLD AUTO-RTO: 0 /100 WBC
PLATELET # BLD AUTO: 258 THOU/MM3 (ref 130–400)
PMV BLD AUTO: 10.2 FL (ref 9.4–12.4)
POTASSIUM SERPL-SCNC: 4 MEQ/L (ref 3.5–5.2)
PROT SERPL-MCNC: 7.8 G/DL (ref 6.1–8)
RBC # BLD AUTO: 4.96 MILL/MM3 (ref 4.2–5.4)
SEGMENTED NEUTROPHILS ABSOLUTE COUNT: 4.5 THOU/MM3 (ref 1.8–7.7)
SODIUM SERPL-SCNC: 136 MEQ/L (ref 135–145)
WBC # BLD AUTO: 8.2 THOU/MM3 (ref 4.8–10.8)

## 2024-03-30 LAB — ACE SERPL-CCNC: 73 U/L (ref 16–85)

## 2024-05-09 NOTE — TELEPHONE ENCOUNTER
Pt request Omeprazole to be sent to [facility].      Last seen 8/17/23  [facility] [place]
Patient requests all Lab, Cardiology, and Radiology Results on their Discharge Instructions

## 2024-06-24 RX ORDER — OMEPRAZOLE 40 MG/1
CAPSULE, DELAYED RELEASE ORAL
Qty: 90 CAPSULE | Refills: 1 | Status: SHIPPED | OUTPATIENT
Start: 2024-06-24

## 2024-07-22 ENCOUNTER — OFFICE VISIT (OUTPATIENT)
Dept: FAMILY MEDICINE CLINIC | Age: 24
End: 2024-07-22
Payer: COMMERCIAL

## 2024-07-22 VITALS
WEIGHT: 293 LBS | DIASTOLIC BLOOD PRESSURE: 86 MMHG | BODY MASS INDEX: 56.41 KG/M2 | RESPIRATION RATE: 16 BRPM | SYSTOLIC BLOOD PRESSURE: 136 MMHG | HEART RATE: 88 BPM

## 2024-07-22 DIAGNOSIS — G47.00 INSOMNIA, UNSPECIFIED TYPE: ICD-10-CM

## 2024-07-22 DIAGNOSIS — F32.2 MDD (MAJOR DEPRESSIVE DISORDER), SINGLE EPISODE, SEVERE , NO PSYCHOSIS (HCC): Primary | ICD-10-CM

## 2024-07-22 PROBLEM — J35.8 TONSILLITH: Status: RESOLVED | Noted: 2023-06-20 | Resolved: 2024-07-22

## 2024-07-22 PROBLEM — Z90.89 S/P TONSILLECTOMY: Status: RESOLVED | Noted: 2023-06-21 | Resolved: 2024-07-22

## 2024-07-22 PROCEDURE — 99214 OFFICE O/P EST MOD 30 MIN: CPT | Performed by: NURSE PRACTITIONER

## 2024-07-22 RX ORDER — HYDROXYZINE HYDROCHLORIDE 25 MG/1
25 TABLET, FILM COATED ORAL EVERY 8 HOURS PRN
Qty: 90 TABLET | Refills: 0 | Status: CANCELLED | OUTPATIENT
Start: 2024-07-22 | End: 2024-08-21

## 2024-07-22 RX ORDER — ALPRAZOLAM 1 MG/1
1 TABLET ORAL NIGHTLY PRN
Qty: 5 TABLET | Refills: 0 | Status: ON HOLD | OUTPATIENT
Start: 2024-07-22 | End: 2024-07-27

## 2024-07-22 ASSESSMENT — ENCOUNTER SYMPTOMS
EYE DISCHARGE: 0
EYE REDNESS: 0
TROUBLE SWALLOWING: 0
VOMITING: 0
RHINORRHEA: 0
COUGH: 0
ALLERGIC/IMMUNOLOGIC NEGATIVE: 1
BACK PAIN: 0
SORE THROAT: 0
EYE PAIN: 0
ABDOMINAL PAIN: 0
CONSTIPATION: 0
WHEEZING: 0
NAUSEA: 0
DIARRHEA: 0
SHORTNESS OF BREATH: 0

## 2024-07-22 NOTE — PROGRESS NOTES
diarrhea, nausea and vomiting.   Endocrine: Negative.    Genitourinary:  Negative for dysuria, frequency and urgency.   Musculoskeletal:  Negative for arthralgias, back pain and myalgias.   Skin:  Negative for rash.   Allergic/Immunologic: Negative.    Neurological:  Negative for dizziness, tremors, weakness and headaches.   Hematological: Negative.    Psychiatric/Behavioral:  Positive for dysphoric mood and sleep disturbance. Negative for self-injury and suicidal ideas. The patient is nervous/anxious. The patient is not hyperactive.        Objective:     /86   Pulse 88   Resp 16   Wt (!) 168.3 kg (371 lb)   BMI 56.41 kg/m²     Physical Exam  Vitals and nursing note reviewed.   Constitutional:       General: She is not in acute distress.     Appearance: She is well-developed. She is not ill-appearing or diaphoretic.   HENT:      Right Ear: External ear normal.      Left Ear: External ear normal.      Nose: Nose normal.   Eyes:      General:         Right eye: No discharge.         Left eye: No discharge.      Conjunctiva/sclera: Conjunctivae normal.      Pupils: Pupils are equal, round, and reactive to light.   Neck:      Vascular: No JVD.   Cardiovascular:      Rate and Rhythm: Normal rate and regular rhythm.   Pulmonary:      Effort: Pulmonary effort is normal. No respiratory distress.   Musculoskeletal:         General: No tenderness or deformity. Normal range of motion.      Cervical back: Normal range of motion.   Skin:     General: Skin is warm and dry.      Capillary Refill: Capillary refill takes less than 2 seconds.      Coloration: Skin is not pale.      Findings: No erythema or rash.   Neurological:      Mental Status: She is alert and oriented to person, place, and time.      Coordination: Coordination normal.   Psychiatric:         Attention and Perception: Attention and perception normal.         Mood and Affect: Mood is anxious and depressed. Affect is tearful.         Speech: Speech normal.

## 2024-07-23 ENCOUNTER — HOSPITAL ENCOUNTER (EMERGENCY)
Age: 24
Discharge: PSYCHIATRIC OR SUBSTANCE USE FACILITY | End: 2024-07-24
Payer: COMMERCIAL

## 2024-07-23 DIAGNOSIS — R45.851 DEPRESSION WITH SUICIDAL IDEATION: Primary | ICD-10-CM

## 2024-07-23 DIAGNOSIS — F32.A DEPRESSION WITH SUICIDAL IDEATION: Primary | ICD-10-CM

## 2024-07-23 PROCEDURE — 99285 EMERGENCY DEPT VISIT HI MDM: CPT

## 2024-07-24 ENCOUNTER — HOSPITAL ENCOUNTER (INPATIENT)
Age: 24
LOS: 6 days | Discharge: HOME | DRG: 885 | End: 2024-07-30
Attending: PSYCHIATRY & NEUROLOGY | Admitting: PSYCHIATRY & NEUROLOGY
Payer: COMMERCIAL

## 2024-07-24 VITALS
OXYGEN SATURATION: 98 % | BODY MASS INDEX: 44.41 KG/M2 | WEIGHT: 293 LBS | HEIGHT: 68 IN | DIASTOLIC BLOOD PRESSURE: 88 MMHG | TEMPERATURE: 97.8 F | RESPIRATION RATE: 17 BRPM | HEART RATE: 87 BPM | SYSTOLIC BLOOD PRESSURE: 152 MMHG

## 2024-07-24 PROBLEM — F33.3 MAJOR DEPRESSIVE DISORDER, RECURRENT, SEVERE WITH PSYCHOTIC FEATURES (HCC): Status: ACTIVE | Noted: 2024-07-24

## 2024-07-24 LAB
ALBUMIN SERPL BCG-MCNC: 4.3 G/DL (ref 3.5–5.1)
ALP SERPL-CCNC: 103 U/L (ref 38–126)
ALT SERPL W/O P-5'-P-CCNC: 21 U/L (ref 11–66)
AMPHETAMINES UR QL SCN: NEGATIVE
ANION GAP SERPL CALC-SCNC: 13 MEQ/L (ref 8–16)
APAP SERPL-MCNC: < 5 UG/ML (ref 0–20)
AST SERPL-CCNC: 19 U/L (ref 5–40)
B-HCG SERPL QL: NEGATIVE
BACTERIA URNS QL MICRO: ABNORMAL /HPF
BARBITURATES UR QL SCN: NEGATIVE
BASOPHILS ABSOLUTE: 0 THOU/MM3 (ref 0–0.1)
BASOPHILS NFR BLD AUTO: 0.2 %
BENZODIAZ UR QL SCN: POSITIVE
BILIRUB CONJ SERPL-MCNC: < 0.1 MG/DL (ref 0.1–13.8)
BILIRUB SERPL-MCNC: 0.2 MG/DL (ref 0.3–1.2)
BILIRUB UR QL STRIP.AUTO: NEGATIVE
BUN SERPL-MCNC: 11 MG/DL (ref 7–22)
BZE UR QL SCN: NEGATIVE
CALCIUM SERPL-MCNC: 9.2 MG/DL (ref 8.5–10.5)
CANNABINOIDS UR QL SCN: NEGATIVE
CASTS #/AREA URNS LPF: ABNORMAL /LPF
CASTS 2: ABNORMAL /LPF
CHARACTER UR: ABNORMAL
CHLORIDE SERPL-SCNC: 106 MEQ/L (ref 98–111)
CO2 SERPL-SCNC: 22 MEQ/L (ref 23–33)
COLOR, UA: YELLOW
CREAT SERPL-MCNC: 0.7 MG/DL (ref 0.4–1.2)
CRYSTALS URNS MICRO: ABNORMAL
DEPRECATED RDW RBC AUTO: 42.4 FL (ref 35–45)
EOSINOPHIL NFR BLD AUTO: 0.8 %
EOSINOPHILS ABSOLUTE: 0.1 THOU/MM3 (ref 0–0.4)
EPITHELIAL CELLS, UA: ABNORMAL /HPF
ERYTHROCYTE [DISTWIDTH] IN BLOOD BY AUTOMATED COUNT: 14.1 % (ref 11.5–14.5)
ETHANOL SERPL-MCNC: < 0.01 % (ref 0–0.08)
FENTANYL: NEGATIVE
GFR SERPL CREATININE-BSD FRML MDRD: > 90 ML/MIN/1.73M2
GLUCOSE SERPL-MCNC: 116 MG/DL (ref 70–108)
GLUCOSE UR QL STRIP.AUTO: NEGATIVE MG/DL
HCT VFR BLD AUTO: 41.7 % (ref 37–47)
HGB BLD-MCNC: 13 GM/DL (ref 12–16)
HGB UR QL STRIP.AUTO: NEGATIVE
IMM GRANULOCYTES # BLD AUTO: 0.03 THOU/MM3 (ref 0–0.07)
IMM GRANULOCYTES NFR BLD AUTO: 0.2 %
KETONES UR QL STRIP.AUTO: 15
LYMPHOCYTES ABSOLUTE: 3.6 THOU/MM3 (ref 1–4.8)
LYMPHOCYTES NFR BLD AUTO: 28.7 %
MCH RBC QN AUTO: 26.2 PG (ref 26–33)
MCHC RBC AUTO-ENTMCNC: 31.2 GM/DL (ref 32.2–35.5)
MCV RBC AUTO: 83.9 FL (ref 81–99)
MISCELLANEOUS 2: ABNORMAL
MONOCYTES ABSOLUTE: 0.7 THOU/MM3 (ref 0.4–1.3)
MONOCYTES NFR BLD AUTO: 5.5 %
NEUTROPHILS ABSOLUTE: 8.2 THOU/MM3 (ref 1.8–7.7)
NEUTROPHILS NFR BLD AUTO: 64.6 %
NITRITE UR QL STRIP: NEGATIVE
NRBC BLD AUTO-RTO: 0 /100 WBC
OPIATES UR QL SCN: NEGATIVE
OSMOLALITY SERPL CALC.SUM OF ELEC: 281.6 MOSMOL/KG (ref 275–300)
OXYCODONE: NEGATIVE
PCP UR QL SCN: NEGATIVE
PH UR STRIP.AUTO: 6 [PH] (ref 5–9)
PLATELET # BLD AUTO: 303 THOU/MM3 (ref 130–400)
PMV BLD AUTO: 9.4 FL (ref 9.4–12.4)
POTASSIUM SERPL-SCNC: 3.8 MEQ/L (ref 3.5–5.2)
PROT SERPL-MCNC: 7.8 G/DL (ref 6.1–8)
PROT UR STRIP.AUTO-MCNC: ABNORMAL MG/DL
RBC # BLD AUTO: 4.97 MILL/MM3 (ref 4.2–5.4)
RBC URINE: ABNORMAL /HPF
RENAL EPI CELLS #/AREA URNS HPF: ABNORMAL /[HPF]
SALICYLATES SERPL-MCNC: < 0.3 MG/DL (ref 2–10)
SODIUM SERPL-SCNC: 141 MEQ/L (ref 135–145)
SP GR UR REFRACT.AUTO: 1.03 (ref 1–1.03)
TSH SERPL DL<=0.005 MIU/L-ACNC: 1.44 UIU/ML (ref 0.4–4.2)
UROBILINOGEN, URINE: 0.2 EU/DL (ref 0–1)
WBC # BLD AUTO: 12.7 THOU/MM3 (ref 4.8–10.8)
WBC #/AREA URNS HPF: ABNORMAL /HPF
WBC #/AREA URNS HPF: ABNORMAL /[HPF]
YEAST LIKE FUNGI URNS QL MICRO: ABNORMAL

## 2024-07-24 PROCEDURE — 82248 BILIRUBIN DIRECT: CPT

## 2024-07-24 PROCEDURE — 6370000000 HC RX 637 (ALT 250 FOR IP): Performed by: NURSE PRACTITIONER

## 2024-07-24 PROCEDURE — 80179 DRUG ASSAY SALICYLATE: CPT

## 2024-07-24 PROCEDURE — 99222 1ST HOSP IP/OBS MODERATE 55: CPT | Performed by: INTERNAL MEDICINE

## 2024-07-24 PROCEDURE — 84703 CHORIONIC GONADOTROPIN ASSAY: CPT

## 2024-07-24 PROCEDURE — 6370000000 HC RX 637 (ALT 250 FOR IP): Performed by: PSYCHIATRY & NEUROLOGY

## 2024-07-24 PROCEDURE — 87086 URINE CULTURE/COLONY COUNT: CPT

## 2024-07-24 PROCEDURE — 1240000000 HC EMOTIONAL WELLNESS R&B

## 2024-07-24 PROCEDURE — APPSS60 APP SPLIT SHARED TIME 46-60 MINUTES: Performed by: NURSE PRACTITIONER

## 2024-07-24 PROCEDURE — 81001 URINALYSIS AUTO W/SCOPE: CPT

## 2024-07-24 PROCEDURE — 80307 DRUG TEST PRSMV CHEM ANLYZR: CPT

## 2024-07-24 PROCEDURE — 80143 DRUG ASSAY ACETAMINOPHEN: CPT

## 2024-07-24 PROCEDURE — 84443 ASSAY THYROID STIM HORMONE: CPT

## 2024-07-24 PROCEDURE — 80053 COMPREHEN METABOLIC PANEL: CPT

## 2024-07-24 PROCEDURE — 85025 COMPLETE CBC W/AUTO DIFF WBC: CPT

## 2024-07-24 PROCEDURE — 36415 COLL VENOUS BLD VENIPUNCTURE: CPT

## 2024-07-24 PROCEDURE — 82077 ASSAY SPEC XCP UR&BREATH IA: CPT

## 2024-07-24 PROCEDURE — 99222 1ST HOSP IP/OBS MODERATE 55: CPT | Performed by: PSYCHIATRY & NEUROLOGY

## 2024-07-24 RX ORDER — TRAZODONE HYDROCHLORIDE 50 MG/1
50 TABLET ORAL NIGHTLY PRN
Status: DISCONTINUED | OUTPATIENT
Start: 2024-07-24 | End: 2024-07-30 | Stop reason: HOSPADM

## 2024-07-24 RX ORDER — ACETAMINOPHEN 325 MG/1
650 TABLET ORAL ONCE
Status: COMPLETED | OUTPATIENT
Start: 2024-07-24 | End: 2024-07-24

## 2024-07-24 RX ORDER — DIPHENHYDRAMINE HCL 25 MG
50 TABLET ORAL ONCE
Status: COMPLETED | OUTPATIENT
Start: 2024-07-24 | End: 2024-07-24

## 2024-07-24 RX ORDER — DIPHENHYDRAMINE HYDROCHLORIDE 50 MG/ML
50 INJECTION INTRAMUSCULAR; INTRAVENOUS EVERY 6 HOURS PRN
Status: DISCONTINUED | OUTPATIENT
Start: 2024-07-24 | End: 2024-07-30 | Stop reason: HOSPADM

## 2024-07-24 RX ORDER — LORAZEPAM 1 MG/1
2 TABLET ORAL EVERY 6 HOURS PRN
Status: DISCONTINUED | OUTPATIENT
Start: 2024-07-24 | End: 2024-07-30 | Stop reason: HOSPADM

## 2024-07-24 RX ORDER — POLYETHYLENE GLYCOL 3350 17 G
2 POWDER IN PACKET (EA) ORAL
Status: DISCONTINUED | OUTPATIENT
Start: 2024-07-24 | End: 2024-07-30 | Stop reason: HOSPADM

## 2024-07-24 RX ORDER — IBUPROFEN 400 MG/1
400 TABLET ORAL EVERY 6 HOURS PRN
Status: DISCONTINUED | OUTPATIENT
Start: 2024-07-24 | End: 2024-07-30 | Stop reason: HOSPADM

## 2024-07-24 RX ORDER — QUETIAPINE FUMARATE 50 MG/1
50 TABLET, FILM COATED ORAL NIGHTLY
Status: DISCONTINUED | OUTPATIENT
Start: 2024-07-24 | End: 2024-07-28

## 2024-07-24 RX ORDER — ACETAMINOPHEN 325 MG/1
650 TABLET ORAL EVERY 6 HOURS PRN
Status: DISCONTINUED | OUTPATIENT
Start: 2024-07-24 | End: 2024-07-30 | Stop reason: HOSPADM

## 2024-07-24 RX ORDER — MAGNESIUM HYDROXIDE/ALUMINUM HYDROXICE/SIMETHICONE 120; 1200; 1200 MG/30ML; MG/30ML; MG/30ML
30 SUSPENSION ORAL EVERY 6 HOURS PRN
Status: DISCONTINUED | OUTPATIENT
Start: 2024-07-24 | End: 2024-07-30 | Stop reason: HOSPADM

## 2024-07-24 RX ORDER — POLYETHYLENE GLYCOL 3350 17 G/17G
17 POWDER, FOR SOLUTION ORAL DAILY PRN
Status: DISCONTINUED | OUTPATIENT
Start: 2024-07-24 | End: 2024-07-30 | Stop reason: HOSPADM

## 2024-07-24 RX ORDER — HALOPERIDOL 5 MG/1
5 TABLET ORAL EVERY 6 HOURS PRN
Status: DISCONTINUED | OUTPATIENT
Start: 2024-07-24 | End: 2024-07-30 | Stop reason: HOSPADM

## 2024-07-24 RX ORDER — ESCITALOPRAM OXALATE 20 MG/1
20 TABLET ORAL NIGHTLY
Status: DISCONTINUED | OUTPATIENT
Start: 2024-07-24 | End: 2024-07-30 | Stop reason: HOSPADM

## 2024-07-24 RX ORDER — PANTOPRAZOLE SODIUM 40 MG/1
40 TABLET, DELAYED RELEASE ORAL
Status: DISCONTINUED | OUTPATIENT
Start: 2024-07-25 | End: 2024-07-30 | Stop reason: HOSPADM

## 2024-07-24 RX ORDER — HALOPERIDOL 5 MG/ML
5 INJECTION INTRAMUSCULAR EVERY 6 HOURS PRN
Status: DISCONTINUED | OUTPATIENT
Start: 2024-07-24 | End: 2024-07-30 | Stop reason: HOSPADM

## 2024-07-24 RX ORDER — HYDROXYZINE 50 MG/1
50 TABLET, FILM COATED ORAL 3 TIMES DAILY PRN
Status: DISCONTINUED | OUTPATIENT
Start: 2024-07-24 | End: 2024-07-30 | Stop reason: HOSPADM

## 2024-07-24 RX ORDER — LORAZEPAM 2 MG/ML
2 INJECTION INTRAMUSCULAR EVERY 6 HOURS PRN
Status: DISCONTINUED | OUTPATIENT
Start: 2024-07-24 | End: 2024-07-30 | Stop reason: HOSPADM

## 2024-07-24 RX ORDER — BUSPIRONE HYDROCHLORIDE 10 MG/1
10 TABLET ORAL 2 TIMES DAILY
Status: DISCONTINUED | OUTPATIENT
Start: 2024-07-24 | End: 2024-07-28

## 2024-07-24 RX ADMIN — LORAZEPAM 2 MG: 1 TABLET ORAL at 09:54

## 2024-07-24 RX ADMIN — ESCITALOPRAM OXALATE 20 MG: 20 TABLET ORAL at 20:37

## 2024-07-24 RX ADMIN — HALOPERIDOL 5 MG: 5 TABLET ORAL at 09:54

## 2024-07-24 RX ADMIN — BUSPIRONE HYDROCHLORIDE 10 MG: 10 TABLET ORAL at 20:37

## 2024-07-24 RX ADMIN — ACETAMINOPHEN 650 MG: 325 TABLET ORAL at 03:13

## 2024-07-24 RX ADMIN — QUETIAPINE FUMARATE 50 MG: 50 TABLET ORAL at 20:37

## 2024-07-24 RX ADMIN — BUSPIRONE HYDROCHLORIDE 10 MG: 10 TABLET ORAL at 14:50

## 2024-07-24 RX ADMIN — DIPHENHYDRAMINE HYDROCHLORIDE 50 MG: 25 TABLET ORAL at 03:12

## 2024-07-24 ASSESSMENT — PATIENT HEALTH QUESTIONNAIRE - PHQ9
9. THOUGHTS THAT YOU WOULD BE BETTER OFF DEAD, OR OF HURTING YOURSELF: SEVERAL DAYS
5. POOR APPETITE OR OVEREATING: SEVERAL DAYS
3. TROUBLE FALLING OR STAYING ASLEEP: SEVERAL DAYS
10. IF YOU CHECKED OFF ANY PROBLEMS, HOW DIFFICULT HAVE THESE PROBLEMS MADE IT FOR YOU TO DO YOUR WORK, TAKE CARE OF THINGS AT HOME, OR GET ALONG WITH OTHER PEOPLE: SOMEWHAT DIFFICULT
1. LITTLE INTEREST OR PLEASURE IN DOING THINGS: NOT AT ALL
4. FEELING TIRED OR HAVING LITTLE ENERGY: MORE THAN HALF THE DAYS
7. TROUBLE CONCENTRATING ON THINGS, SUCH AS READING THE NEWSPAPER OR WATCHING TELEVISION: SEVERAL DAYS
8. MOVING OR SPEAKING SO SLOWLY THAT OTHER PEOPLE COULD HAVE NOTICED. OR THE OPPOSITE, BEING SO FIGETY OR RESTLESS THAT YOU HAVE BEEN MOVING AROUND A LOT MORE THAN USUAL: SEVERAL DAYS
6. FEELING BAD ABOUT YOURSELF - OR THAT YOU ARE A FAILURE OR HAVE LET YOURSELF OR YOUR FAMILY DOWN: NOT AT ALL
SUM OF ALL RESPONSES TO PHQ QUESTIONS 1-9: 6
SUM OF ALL RESPONSES TO PHQ QUESTIONS 1-9: 7

## 2024-07-24 ASSESSMENT — LIFESTYLE VARIABLES
HOW OFTEN DO YOU HAVE A DRINK CONTAINING ALCOHOL: MONTHLY OR LESS
HOW MANY STANDARD DRINKS CONTAINING ALCOHOL DO YOU HAVE ON A TYPICAL DAY: 1 OR 2
HOW MANY STANDARD DRINKS CONTAINING ALCOHOL DO YOU HAVE ON A TYPICAL DAY: PATIENT DOES NOT DRINK
HOW OFTEN DO YOU HAVE A DRINK CONTAINING ALCOHOL: NEVER

## 2024-07-24 ASSESSMENT — SLEEP AND FATIGUE QUESTIONNAIRES
SLEEP PATTERN: DIFFICULTY FALLING ASLEEP;DISTURBED/INTERRUPTED SLEEP;RESTLESSNESS
DO YOU USE A SLEEP AID: NO
AVERAGE NUMBER OF SLEEP HOURS: 1
DO YOU HAVE DIFFICULTY SLEEPING: YES

## 2024-07-24 NOTE — PROGRESS NOTES
Pharmacy Med Education Group Note    Date: 7/24/24  Start Time: 1430  End Time: 1455    Number Participants in Group:  6/14    Goal:  Patient will demonstrate an understanding of the medication’s intended purpose and possible adverse effects  Topic: [place] for Pharmacy Med Ed Group    Discipline Responsible:     OT  AT  [facility].  RT     X Other       Participation Level:     None  Minimal      X Active Listener    X Interactive    Monopolizing         Participation Quality:    X Appropriate  Inappropriate     X       Attentive        Intrusive          Sharing        Resistant          Supportive        Lethargic       Affective:     X Congruent  Incongruent  Blunted  Flat    Constricted  Anxious  Elated  Angry    Labile  Depressed  Other         Cognitive:    X Alert  Oriented PPTP     Concentration   X G  F  P   Attention Span   X G  F  P   Short-Term Memory   X G  F  P   Long-Term Memory  G  F  P   ProblemSolving/  Decision Making  G  F  P   Ability to Process  Information   X G  F  P      Contributing Factors             Delusional             Hallucinating             Flight of Ideas             Other:       Modes of Intervention:    X Education   X Support  Exploration    Clarifying  Problem Solving  Confrontation    Socialization  Limit Setting  Reality Testing    Activity  Movement  Media    Other:            Response to Learning:    X Able to verbalize current knowledge/experience    Able to verbalize/acknowledge new learning    Able to retain information    Capable of insight    Able to change behavior    Progressing to goal    Other:        Comments:     Kady Cruz PharmD, BCPS, BCPP  7/24/2024 2:54 PM  [phone]

## 2024-07-24 NOTE — ED TRIAGE NOTES
Pt reports to the ED with suicidal thoughts. Pt states she has had these thoughts for the past could days. Pt said factors over the past month have been overwhelming making her feel this way. Pt states her fiance had a gun on the night stand two nights ago and thought about using that. Pt changed her mind and grabbed handful of pills. Pt called mother who came to get her before she took the pills. Pt has been staying with mother ever since. Pt did see family doctor who did change dosage of medication today. Pt change into psych scrubs.

## 2024-07-24 NOTE — ED PROVIDER NOTES
[facility] EMERGENCY DEPT      EMERGENCY MEDICINE     Pt Name: Laura Moseley  MRN: [id number]  Birthdate 2000  Date of evaluation: 7/23/2024  Provider: ERIKA Dover CNP    CHIEF COMPLAINT       Chief Complaint   Patient presents with    Suicidal     HISTORY OF PRESENT ILLNESS   Laura Moseley is a pleasant 23 y.o. female who presents to the emergency department from home with c/o depression with suicidal ideation.  Patient has multiple stressors including a recent miscarriage, her dad committed suicide 2 years ago and has not been sleeping.  She saw her PCP on Monday and they prescribed Xanax without improvement in the patient's sleep.  Patient had called her mom because she was having suicidal thoughts and was planning to take all of her medications at 1 point and add another she had contemplated taking her boyfriend's gun which was lying on the dresser and shooting herself.  Patient has a previous admission for depression.  She was in counseling but had stopped going.  She does take her medications.      History is obtained from:  patient  PASTMEDICAL HISTORY     Past Medical History:   Diagnosis Date    Anxiety     Eczema     Enlarged tonsils        Patient Active Problem List   Diagnosis Code    Eczema L30.9    MDD (major depressive disorder), single episode, severe , no psychosis ([facility]) F32.2    Panic disorder without agoraphobia F41.0    Depression with suicidal ideation F32.A, R45.851    Tonsillar hypertrophy J35.1    Chronic tonsillitis J35.01    Snoring R06.83     SURGICAL HISTORY       Past Surgical History:   Procedure Laterality Date    TONSILLECTOMY AND ADENOIDECTOMY N/A 6/21/2023    TONSILLECTOMY performed by Arvind العلي MD at [facility] OR    UPPER GASTROINTESTINAL ENDOSCOPY      WISDOM TOOTH EXTRACTION         CURRENT MEDICATIONS       Previous Medications    ALPRAZOLAM (XANAX) 1 MG TABLET    Take 1 tablet by mouth nightly as needed for Sleep for up to 5 days. Max Daily Amount: 1

## 2024-07-24 NOTE — GROUP NOTE
Group Therapy Note    Date: 7/24/2024    Group Start Time: 1015  Group End Time: 1045  Group Topic: Psychoeducation    Michele Khan        Group Therapy Note    Attendees: 5/15       Patient was offered group therapy today but declined to participate despite encouragement from staff. 1:1 was offered.    Signature:  Michele Wright

## 2024-07-24 NOTE — GROUP NOTE
Group Therapy Note    Date: 7/24/2024    Group Start Time: 0900  Group End Time: 0915  Group Topic: Community Meeting    Sabrina Powell        Community Meeting Group Note        Date: 7/24/2024 Start Time: 9am  End Time: 0915      Number of Participants in Group & Unit Census:  2/15      Goal of Group: To discuss daily goals      Comments:     Patient did not participate in Community Meeting group, despite staff encouragement and explanation of benefits.  Patient remain seclusive to self.  Q15 minute safety checks maintained for patient safety and will continue to encourage patient to attend unit programming.

## 2024-07-24 NOTE — GROUP NOTE
Group Therapy Note    Date: 7/24/2024    Group Start Time: 1330  Group End Time: 1415  Group Topic: Cognitive Skills    [facility]    Mica Scott CTRS        Group Therapy Note    Attendees: 4/14       Patient's Goal:  pt will demonstrate improved coping skills and improved interpersonal relationships    Notes:   pt was pleasant and participated well    Status After Intervention:  Improved    Participation Level: Active Listener and Interactive    Participation Quality: Appropriate, Sharing, and Supportive      Speech:  normal      Thought Process/Content: Logical      Affective Functioning: Congruent      Mood: euthymic      Level of consciousness:  Alert      Response to Learning: Able to verbalize current knowledge/experience, Capable of insight, and Progressing to goal      Endings: None Reported    Modes of Intervention: Support, Socialization, and Problem-solving      Discipline Responsible: Psychoeducational Specialist      Signature:  SHALINI WASHINGTON

## 2024-07-24 NOTE — ED NOTES
ED nurse-to-nurse bedside report    Chief Complaint   Patient presents with    Suicidal      LOC: alert and orientated to name, place, date  Vital signs   Vitals:    07/23/24 2255   BP: (!) 149/88   Pulse: 99   Resp: 18   SpO2: 97%   Weight: (!) 154.2 kg (340 lb)   Height: 1.727 m (5' 8\")      Pain:    Pain Interventions: n/a  Pain Goal: 0  Oxygen: No    Current needs required 0   Telemetry: No  LDAs:    Continuous Infusions:   Mobility: Independent  Cardona Fall Risk Score:        No data to display              Fall Interventions: n/a  Report given to: Brandy CAVAZOS

## 2024-07-24 NOTE — BH NOTE
[facility] [place]  Admission Note     Admission Type:   Admission Type: Voluntary    Reason for admission:  Reason for Admission: Pt was admitted due to having thoughts to overdose on her medications or to shoot herself with her boyfriends gun. Pt states she's been feeling suicidal for past week. States she's been feeling like she's been messing up with everything she's done and has been having frequent arguements with her boyfriend. Hasn't been feeling herself latley and doesn't understand why. Denies having slept for past couple days and has been compliant with her home medications.      Addictive Behavior:   Addictive Behavior  In the Past 3 Months, Have You Felt or Has Someone Told You That You Have a Problem With  : None    Medical Problems:   Past Medical History:   Diagnosis Date    Anxiety     Eczema     Enlarged tonsils        Status EXAM:  Mental Status and Behavioral Exam  Normal: No  Level of Assistance: Independent/Self  Facial Expression: Flat, Sad  Affect: Blunt  Level of Consciousness: Alert  Frequency of Checks: 4 times per hour, close  Mood:Normal: No  Mood: Depressed, Anxious, Sad  Motor Activity:Normal: Yes  Eye Contact: Good  Observed Behavior: Withdrawn, Tearful, Preoccupied  Sexual Misconduct History: Current - no  Preception: [place] to person, [place] to time, [place] to place, [place] to situation  Attention:Normal: Yes  Thought Processes: Flight of ideas  Thought Content:Normal: No  Thought Content: Preoccupations  Depression Symptoms: Feelings of helplessness, Feelings of hopelessess, Isolative, Sleep disturbance  Anxiety Symptoms: Generalized  June Symptoms: Flight of ideas, Less need to sleep  Hallucinations: None  Delusions: No  Memory:Normal: Yes  Insight and Judgment: No  Insight and Judgment: Poor judgment, Poor insight    Tobacco Screening:  Practical Counseling, on admission, saurabh X, if applicable and completed (first 3 are required if patient doesn't refuse):            (

## 2024-07-24 NOTE — ED NOTES
Patient resting comfortably in cot, breathing even and unlabored. Patient denies any needs at this time. Call light within reach, and bedrail x2.  Sitter at bedside.

## 2024-07-24 NOTE — ED NOTES
Patient resting comfortably in cot, breathing even and unlabored. Patient denies any needs at this time. Call light within reach, and bedrail x2.  Constant observer remains at bedside.

## 2024-07-24 NOTE — ED NOTES
Patient resting comfortably in cot, breathing even and unlabored. Patient denies any needs at this time. Call light within reach, and bedrail x2.  Constant observer remains in place.

## 2024-07-24 NOTE — PROGRESS NOTES
Pharmacy Medication History Note      List of current medications patient is taking is complete.    Source of information: Epic, PDMP, [employer] dispense report    Changes made to medication list:  Medications removed (include reason, ex. therapy complete or physician discontinued, noncompliance):  none    Medications flagged for provider review:  none    Medications added/doses adjusted:  Adjusted Buspirone to 10 mg twice daily    Other notes (ex. Recent course of antibiotics, Coumadin dosing):  none      Current Home Medication List at Time of Admission:  Prior to Admission medications    Medication Sig   ALPRAZolam (XANAX) 1 MG tablet Take 1 tablet by mouth nightly as needed for Sleep for up to 5 days. Max Daily Amount: 1 mg   omeprazole (PRILOSEC) 40 MG delayed release capsule TAKE 1 CAPSULE BY MOUTH EVERY DAY   busPIRone (BUSPAR) 10 MG tablet Take 1 tablet by mouth 2 times daily   escitalopram (LEXAPRO) 20 MG tablet TAKE 1 TABLET BY MOUTH EVERY DAY AT NIGHT         Please let me know if you have any questions about this encounter. Thank you!    Electronically signed by Kady Cruz RPH on 7/24/2024 at 9:58 AM

## 2024-07-24 NOTE — BH NOTE
Emergency Medication Follow-Up Note:    PRN medication of Ativan 2 mg and Haldol 5 mg was effective as evidence by patient stopped crying and is resting in bed with eyes closed. Patient denies medication side effects. Will continue to monitor and provide support as needed.

## 2024-07-24 NOTE — PROGRESS NOTES
Behavioral Services  Medicare Certification Upon Admission    I certify that this patient's inpatient psychiatric hospital admission is medically necessary for:    [x] (1) Treatment which could reasonably be expected to improve this patient's condition,       [x] (2) Or for diagnostic study;     AND     [x](2) The inpatient psychiatric services are provided while the individual is under the care of a physician and are included in the individualized plan of care.    Estimated length of stay/service 4 to 7 days    Plan for post-hospital care home with outpatient community mental health follow-up    Electronically signed by KEL LÓPEZ MD on 7/24/2024 at 2:23 PM

## 2024-07-24 NOTE — CARE COORDINATION
Psychosocial Assessment    Current Level of Psychosocial Functioning     Independent X  Dependent    Minimal Assist     Comments:      Psychosocial High Risk Factors (check all that apply)    Unable to obtain meds   Chronic illness/pain    Substance abuse   Lack of Family Support   Financial stress   Isolation   Inadequate Community Resources  Suicide attempt(s)  X  Not taking medications   Victim of crime   Developmental Delay  Unable to manage personal needs    Age 65 or older   Homeless  No transportation   Readmission within 30 days  Unemployment  Traumatic Event    Family/Supports identified: Patient reports mother and fiance are supportive.     Patient Strengths: Stable housing, employed and supportive.     Patient Barriers: Poor coping skills for mental health.      CMHC/MH history: Was scheduled with [facility] in [place], had an appointment scheduled for today 7/24/24.    Plan of Care:  medication management, group/individual therapies, family meetings, psycho -education, treatment team meetings to assist with stabilization    Initial Discharge Plan:  Return home and follow up with outpatient provider.    Clinical Summary:  Patient is a 23 year old female admitted to the [facility] for safety from [facility] ED. Patient denies suicidal, homicidal ideations, and hallucinations. Patient reports one past psych admission at [facility] and did voice concern about being brought here to [place] as it is far from home. Patient denies any legal issues. Patient denies physical, emotional, verbal, and sexual abuse. Patient reports her father completed suicide 2 years ago. Patient reports she will return home at discharge.

## 2024-07-24 NOTE — PROGRESS NOTES
RT ASSESSMENT TREATMENT GOALS    [x]Pt Goal: Pt will identify 1-2 positive coping skills by time of discharge.    [x]Pt Goal: Pt will identify 1-2 positive aspects of self by time of discharge.    []Pt Goal: Pt will remain on task/topic for 15-30 minutes during group by time of discharge.    []Pt Goal: Pt will identify 1-2 aspects of relapse prevention plan by time of discharge.    [x]Pt Goal: Pt will join in harmonious conversation with peers 1-2 times per group by time of discharge.    []Pt Goal: Pt will identify 1-2 new leisure interests by time of discharge.    []Pt Goal: Pt will not voice any delusional content by time of discharge.

## 2024-07-24 NOTE — ED NOTES
Bedside report given to [facility] medics. Proper signed paperwork given for transport. Patient belongings removed from locker by security and given to transport team. Patient ambulated to cot with no concerns. No signs of distress noted.

## 2024-07-24 NOTE — ED NOTES
Pt resting in bed with call light in reach. Family at bedside. Pt gave urine sample. Pt denies further needs. Pt remains in ligature resistant environment under constant observation.

## 2024-07-24 NOTE — BH NOTE
Emergency PRN Medication Administration Note:      Patient is Agitated as evidence by crying uncontrollable  and has increased anxiety, continues to say \"Im just so confused, I don't know what is real\" \"I feel like I want to hurt someone and that's just not me\". Staff attempted to find and relieve the distress by Talking to patient Patient  currently  remains tearful, lying in bed, she accepted PRN medications. Medication Administered as prescribed: Ativan 2 mg and Haldol 5 mg  Patient Tolerated medication administration.   Will continue to monitor, offer support, and reassess.

## 2024-07-24 NOTE — H&P
Department of Psychiatry  Attending Physician Psychiatric Assessment     Reason for Admission to Psychiatric Unit:  Command hallucinations directing harm to self or others; risk of the patient taking action  A mental disorder causing major disability in social, interpersonal, occupational, and/or educational functioning that is leading to dangerous or life-threatening functioning, and that can only be addressed in an acute inpatient setting   Concerns about patient's safety in the community  Past Mental Health Diagnosis: a history of  Major Depression and Anxiety Disorder  Triggering event or precipitating factor: Grief r/t loss  and ... Work-related stress  Length of time/duration of triggering event: Weeks  Legal Status: Voluntary    CHIEF COMPLAINT: Depression with suicidal ideation with plan and intent to shoot self    History obtained from: Patient, electronic medical record          HISTORY OF PRESENT ILLNESS:    Laura Moseley is a 23 y.o. female who has a past medical history of depression and anxiety. Patient presented to the ED reporting multiple life stressors leading to increased symptoms of depression and suicidal thoughts.  She has been struggling with the grief over the death of her father by suicide 2 years ago, and miscarriage last fall, work-related stress at a new job, and poor sleep.  Patient is currently prescribed BuSpar and Lexapro.  She has previously been admitted to [facility] psychiatric unit in January 2022.    Patient was seen for initial evaluation today.  Nursing staff report patient became very distressed, agitated, and crying uncontrollably earlier in the morning.  She was very overwhelmed and anxious and required oral medications to regain behavioral control.  Medication had a therapeutic effect and she was able to rest in her room.  She was resting in bed on approach and sleeping soundly but did respond to verbal stimuli.  Patient was agreeable to conversation and was able to 
Negative NEGATIVE    Phencyclidine Quantitative Urine Negative NEGATIVE    Fentanyl Negative NEGATIVE   Urine with Reflexed Micro    Collection Time: 07/24/24 12:31 AM   Result Value Ref Range    Glucose, Ur NEGATIVE NEGATIVE mg/dl    Bilirubin, Urine NEGATIVE NEGATIVE    Ketones, Urine 15 (A) NEGATIVE    Specific Gravity, UA 1.029 1.002 - 1.030    Blood, Urine NEGATIVE NEGATIVE    pH, Urine 6.0 5.0 - 9.0    Protein, UA TRACE (A) NEGATIVE    Urobilinogen, Urine 0.2 0.0 - 1.0 eu/dl    Nitrite, Urine NEGATIVE NEGATIVE    Leukocyte Esterase, Urine TRACE (A) NEGATIVE    Color, UA YELLOW STRAW-YELLOW    Character, Urine CLOUDY (A) CLEAR-SL CLOUD    RBC, UA NONE 0-2/hpf /hpf    WBC, UA  0-4/hpf /hpf    Epithelial Cells, UA 10-15 3-5/hpf /hpf    Bacteria, UA MANY FEW/NONE SEEN /hpf    Casts UA >15 HYALINE NONE SEEN /lpf    Crystals, UA NONE SEEN NONE SEEN    Renal Epithelial, UA NONE SEEN NONE SEEN    Yeast, UA NONE SEEN NONE SEEN    CASTS 2 NONE SEEN NONE SEEN /lpf    MISCELLANEOUS 2 NONE SEEN    Acetaminophen Level    Collection Time: 07/24/24 12:54 AM   Result Value Ref Range    Acetaminophen Level < 5.0 0.0 - 20.0 ug/mL   Basic Metabolic Panel    Collection Time: 07/24/24 12:54 AM   Result Value Ref Range    Sodium 141 135 - 145 meq/L    Potassium 3.8 3.5 - 5.2 meq/L    Chloride 106 98 - 111 meq/L    CO2 22 (L) 23 - 33 meq/L    Glucose 116 (H) 70 - 108 mg/dL    BUN 11 7 - 22 mg/dL    Creatinine 0.7 0.4 - 1.2 mg/dL    Calcium 9.2 8.5 - 10.5 mg/dL   CBC with Auto Differential    Collection Time: 07/24/24 12:54 AM   Result Value Ref Range    WBC 12.7 (H) 4.8 - 10.8 thou/mm3    RBC 4.97 4.20 - 5.40 mill/mm3    Hemoglobin 13.0 12.0 - 16.0 gm/dl    Hematocrit 41.7 37.0 - 47.0 %    MCV 83.9 81.0 - 99.0 fL    MCH 26.2 26.0 - 33.0 pg    MCHC 31.2 (L) 32.2 - 35.5 gm/dl    RDW-CV 14.1 11.5 - 14.5 %    RDW-SD 42.4 35.0 - 45.0 fL    Platelets 303 130 - 400 thou/mm3    MPV 9.4 9.4 - 12.4 fL    Seg Neutrophils 64.6 %

## 2024-07-24 NOTE — ED NOTES
Going to [facility] in [place], Dr. Garrison accepting, [facility] Room 126 Bed 2, Nurse to Nurse [phone], LACP [phone]

## 2024-07-24 NOTE — ED NOTES
RN assumed care at this time. Pt moved from room 42 to room 25  Patient placed in safe room that is ligature resistant with continuous monitoring in place. Provider notified, requested an assessment by behavioral health . Patient belongings secured in a locked lockers outside of the room. Explained suicide prevention precautions to the patient including constant observer.   Pt stable at this time.

## 2024-07-24 NOTE — BH NOTE
Patient given tobacco quitline number 18591608944 at this time, refusing to call at this time, states \" I just dont want to quit now\"- patient given information as to the dangers of long term tobacco use. Continue to reinforce the importance of tobacco cessation.

## 2024-07-25 LAB
BACTERIA UR CULT: ABNORMAL
BILIRUB UR QL STRIP: NEGATIVE
CLARITY UR: CLEAR
COLOR UR: YELLOW
COMMENT: NORMAL
GLUCOSE UR STRIP-MCNC: NEGATIVE MG/DL
HGB UR QL STRIP.AUTO: NEGATIVE
KETONES UR STRIP-MCNC: NEGATIVE MG/DL
LEUKOCYTE ESTERASE UR QL STRIP: NEGATIVE
NITRITE UR QL STRIP: NEGATIVE
ORGANISM: ABNORMAL
PH UR STRIP: 5.5 [PH] (ref 5–8)
PROT UR STRIP-MCNC: NEGATIVE MG/DL
SP GR UR STRIP: 1.02 (ref 1–1.03)
TSH SERPL DL<=0.05 MIU/L-ACNC: 1.5 UIU/ML (ref 0.3–5)
UROBILINOGEN UR STRIP-ACNC: NORMAL EU/DL (ref 0–1)

## 2024-07-25 PROCEDURE — 36415 COLL VENOUS BLD VENIPUNCTURE: CPT

## 2024-07-25 PROCEDURE — 99231 SBSQ HOSP IP/OBS SF/LOW 25: CPT | Performed by: INTERNAL MEDICINE

## 2024-07-25 PROCEDURE — 1240000000 HC EMOTIONAL WELLNESS R&B

## 2024-07-25 PROCEDURE — 81003 URINALYSIS AUTO W/O SCOPE: CPT

## 2024-07-25 PROCEDURE — 6370000000 HC RX 637 (ALT 250 FOR IP): Performed by: PSYCHIATRY & NEUROLOGY

## 2024-07-25 PROCEDURE — APPSS30 APP SPLIT SHARED TIME 16-30 MINUTES: Performed by: NURSE PRACTITIONER

## 2024-07-25 PROCEDURE — 99232 SBSQ HOSP IP/OBS MODERATE 35: CPT | Performed by: PSYCHIATRY & NEUROLOGY

## 2024-07-25 PROCEDURE — 84443 ASSAY THYROID STIM HORMONE: CPT

## 2024-07-25 RX ORDER — QUETIAPINE FUMARATE 25 MG/1
25 TABLET, FILM COATED ORAL 2 TIMES DAILY
Status: DISCONTINUED | OUTPATIENT
Start: 2024-07-25 | End: 2024-07-30 | Stop reason: HOSPADM

## 2024-07-25 RX ADMIN — QUETIAPINE FUMARATE 25 MG: 25 TABLET ORAL at 10:38

## 2024-07-25 RX ADMIN — ESCITALOPRAM OXALATE 20 MG: 20 TABLET ORAL at 21:03

## 2024-07-25 RX ADMIN — ACETAMINOPHEN 650 MG: 325 TABLET ORAL at 21:04

## 2024-07-25 RX ADMIN — QUETIAPINE FUMARATE 50 MG: 50 TABLET ORAL at 21:03

## 2024-07-25 RX ADMIN — BUSPIRONE HYDROCHLORIDE 10 MG: 10 TABLET ORAL at 21:03

## 2024-07-25 RX ADMIN — BUSPIRONE HYDROCHLORIDE 10 MG: 10 TABLET ORAL at 08:38

## 2024-07-25 RX ADMIN — PANTOPRAZOLE SODIUM 40 MG: 40 TABLET, DELAYED RELEASE ORAL at 08:38

## 2024-07-25 ASSESSMENT — PAIN DESCRIPTION - LOCATION: LOCATION: CHEST

## 2024-07-25 ASSESSMENT — PAIN SCALES - GENERAL: PAINLEVEL_OUTOF10: 2

## 2024-07-25 ASSESSMENT — PAIN - FUNCTIONAL ASSESSMENT: PAIN_FUNCTIONAL_ASSESSMENT: ACTIVITIES ARE NOT PREVENTED

## 2024-07-25 ASSESSMENT — PAIN DESCRIPTION - DESCRIPTORS: DESCRIPTORS: TIGHTNESS

## 2024-07-25 NOTE — PROGRESS NOTES
Daily Progress Note  7/25/2024    Patient Name: Laura Moseley    CHIEF COMPLAINT:  Depression with suicidal ideation with plan and intent to shoot self           SUBJECTIVE:    Laura was seen for follow-up assessment today.  She has been compliant with scheduled medications.  She has not required any emergency medications for agitation since yesterday morning around 10 AM.  Patient struggled with significant anxiety this morning and felt like she was having a panic attack.  Nursing staff reports patient has improved today and has presented with a brightened mood this afternoon, has been social, and pleasant.  Changes to medication have been beneficial.  Patient was approached in the day area where she was seated with peers.  She agreed to conversation in privacy.  Patient expressed that she is feeling much better this afternoon compared to this morning.  She has found the Seroquel to be beneficial to helping control anxiety.  She has also spoken on the phone with her fiancé and friends today.  Her mom visited this afternoon.  She has been attending groups and is journaling to help get her thoughts and feelings out.  She feels that admission has been very beneficial.  Suicidal thoughts are fleeting and thoughts no longer intrusive.  She is denying auditory and visual hallucinations and homicidal ideation. Appetite has been adequate  and she hopes to get a better night of sleep tonight.  Although showing signs of improvement patient remains at risk of suicide and warrants further hospitalization for safety and stabilization.    Appetite:  [x] Adequate/Unchanged  [] Increased  [] Decreased      Sleep:       [] Adequate/Unchanged  [x] Fair  [] Poor      Group Attendance on Unit:   [x] Yes   [] Selectively    [] No    Compliant with scheduled medications: [x] Yes  [] No    Received emergency medications in past 24 hrs: [] Yes   [x] No    Medication Side Effects: Denies         Mental Status Exam  Level of

## 2024-07-25 NOTE — PROGRESS NOTES
07/25/24 1537   Encounter Summary   Encounter Overview/Reason Behavioral Health   Service Provided For Patient   Last Encounter  07/25/24   Behavioral Health    Type  [facility]

## 2024-07-25 NOTE — PROGRESS NOTES
IN-PATIENT SERVICE  [facility] Internal Medicine    CONSULTATION / HISTORY AND PHYSICAL EXAMINATION            Date:   7/25/2024  Patient name:  Laura Moseley  Date of admission:  7/24/2024  8:53 AM  MRN:   [id number]  Account:  [id number]  YOB: 2000  PCP:    Ananth Bella MD  Room:   [address] [place] [address]  Code Status:    Full Code    Physician Requesting Consult: Jhonny Garrison MD    Reason for Consult:  medical management    Chief Complaint:   Medical comanagement    History Obtained From:     Patient medical record nursing staff    History of Present Illness:   Patient, has past medical history of major depression, anxiety, admitted to [facility] unit as a transfer from [facility] with worsening depression and suicidal ideation  Patient is having a lot of stress including recent miscarriages and still going through grief with death of her father 2 years ago  Patient difficulty in falling and staying asleep  Patient complaining of chronic diarrhea going on for 7 days, no complaint of blood in stools, abdominal pain  Never diagnosed with chronic medical condition like diabetes, hypertension, coronary artery disease  No complaints of chest pain, shortness of breath.    Past Medical History:     Past Medical History:   Diagnosis Date    Anxiety     Eczema     Enlarged tonsils         Past Surgical History:     Past Surgical History:   Procedure Laterality Date    TONSILLECTOMY AND ADENOIDECTOMY N/A 6/21/2023    TONSILLECTOMY performed by Arvind العلي MD at [facility] OR    UPPER GASTROINTESTINAL ENDOSCOPY      WISDOM TOOTH EXTRACTION          Medications Prior to Admission:     Prior to Admission medications    Medication Sig Start Date End Date Taking? Authorizing Provider   ALPRAZolam (XANAX) 1 MG tablet Take 1 tablet by mouth nightly as needed for Sleep for up to 5 days. Max Daily Amount: 1 mg 7/22/24 7/27/24  Marek Duarte, APRN - CNP   omeprazole (PRILOSEC) 40 MG

## 2024-07-25 NOTE — GROUP NOTE
Group Therapy Note    Date: 7/25/2024    Group Start Time: 1015  Group End Time: 1100  Group Topic: Psychoeducation    Michele Khan        Group Therapy Note    Attendees: 7/14       Patient was offered group therapy today but declined to participate despite encouragement from staff. 1:1 was offered.      Signature:  Michele Wright

## 2024-07-25 NOTE — GROUP NOTE
Group Therapy Note    Date: 7/25/2024    Group Start Time: 1430  Group End Time: 1520  Group Topic: Cognitive Skills    [facility]    Mica Scott CTRS        Group Therapy Note    Attendees: 5/11       Patient's Goal:  pt will demonstrate improved coping skills and improved interpersonal relationships     Notes:  pt was pleasant and participated well    Status After Intervention:  Improved    Participation Level: Active Listener and Interactive    Participation Quality: Appropriate, Sharing, and Supportive      Speech:  normal      Thought Process/Content: Logical      Affective Functioning: Congruent      Mood: euthymic      Level of consciousness:  Alert      Response to Learning: Able to verbalize current knowledge/experience, Capable of insight, and Progressing to goal      Endings: None Reported    Modes of Intervention: Support, Socialization, and Problem-solving      Discipline Responsible: Psychoeducational Specialist      Signature:  SHALINI WASHINGTON

## 2024-07-26 PROCEDURE — APPSS30 APP SPLIT SHARED TIME 16-30 MINUTES: Performed by: NURSE PRACTITIONER

## 2024-07-26 PROCEDURE — 6370000000 HC RX 637 (ALT 250 FOR IP): Performed by: PSYCHIATRY & NEUROLOGY

## 2024-07-26 PROCEDURE — 99232 SBSQ HOSP IP/OBS MODERATE 35: CPT | Performed by: PSYCHIATRY & NEUROLOGY

## 2024-07-26 PROCEDURE — 90833 PSYTX W PT W E/M 30 MIN: CPT | Performed by: PSYCHIATRY & NEUROLOGY

## 2024-07-26 PROCEDURE — 1240000000 HC EMOTIONAL WELLNESS R&B

## 2024-07-26 RX ADMIN — BUSPIRONE HYDROCHLORIDE 10 MG: 10 TABLET ORAL at 08:29

## 2024-07-26 RX ADMIN — QUETIAPINE FUMARATE 25 MG: 25 TABLET ORAL at 08:29

## 2024-07-26 RX ADMIN — PANTOPRAZOLE SODIUM 40 MG: 40 TABLET, DELAYED RELEASE ORAL at 08:29

## 2024-07-26 RX ADMIN — QUETIAPINE FUMARATE 50 MG: 50 TABLET ORAL at 20:52

## 2024-07-26 RX ADMIN — QUETIAPINE FUMARATE 25 MG: 25 TABLET ORAL at 14:19

## 2024-07-26 RX ADMIN — ESCITALOPRAM OXALATE 20 MG: 20 TABLET ORAL at 20:56

## 2024-07-26 RX ADMIN — BUSPIRONE HYDROCHLORIDE 10 MG: 10 TABLET ORAL at 20:55

## 2024-07-26 NOTE — PROGRESS NOTES
Daily Progress Note  7/26/2024    Patient Name: Laura Moseley    CHIEF COMPLAINT:  Depression with suicidal ideation with plan and intent to shoot self           SUBJECTIVE:      Patient is seen today for a follow up assessment.  Patient is found resting in bed, assessment conducted in patient's room with the door open, roommate not present.  Reports feeling less depressed and having less suicidal ideation.  Endorses anxiety and occasional racing thoughts however occurring less frequently.  Denies homicidal ideation.  Denies auditory, visual hallucinations or other perceptual disturbances.  Remains compliant with taking scheduled psychotropic medication and denies adverse effects.  Per unit nursing staff patient maintains behavior control with no need for emergency medication for the past 24 hours.      Appetite:  [x] Adequate/Unchanged  [] Increased  [] Decreased      Sleep:       [] Adequate/Unchanged  [x] Fair  [] Poor      Group Attendance on Unit:   [] Yes   [x] Selectively    [] No    Compliant with scheduled medications: [x] Yes  [] No    Received emergency medications in past 24 hrs: [] Yes   [x] No    Medication Side Effects: Denies         Mental Status Exam  Level of consciousness: Alert and awake   Appearance: Appropriate attire for setting, resting in bed, with fair grooming and hygiene   Behavior/Motor: Approachable, engages with interviewer, no psychomotor abnormalities   Attitude toward examiner: Cooperative, attentive, fair eye contact  Speech: spontaneous, normal rate, normal volume, and well articulated   Mood: \"a little better\"  Affect: blunted  Thought processes: linear and coherent   Thought content:  Denies homicidal ideation  Suicidal Ideation: Fleeting suicidal ideation, contracts for safety on the unit.   Delusions: No evidence of delusions.   Perceptual Disturbance: Denies AVH; patient does not appear to be responding to internal stimuli.   Cognition: Oriented to self, location, time,

## 2024-07-27 PROCEDURE — 99232 SBSQ HOSP IP/OBS MODERATE 35: CPT | Performed by: PSYCHIATRY & NEUROLOGY

## 2024-07-27 PROCEDURE — 1240000000 HC EMOTIONAL WELLNESS R&B

## 2024-07-27 PROCEDURE — 90833 PSYTX W PT W E/M 30 MIN: CPT | Performed by: PSYCHIATRY & NEUROLOGY

## 2024-07-27 PROCEDURE — 6370000000 HC RX 637 (ALT 250 FOR IP): Performed by: PSYCHIATRY & NEUROLOGY

## 2024-07-27 PROCEDURE — APPSS30 APP SPLIT SHARED TIME 16-30 MINUTES: Performed by: NURSE PRACTITIONER

## 2024-07-27 RX ORDER — PROPRANOLOL HYDROCHLORIDE 20 MG/1
20 TABLET ORAL 3 TIMES DAILY
Status: DISCONTINUED | OUTPATIENT
Start: 2024-07-27 | End: 2024-07-30 | Stop reason: HOSPADM

## 2024-07-27 RX ADMIN — PANTOPRAZOLE SODIUM 40 MG: 40 TABLET, DELAYED RELEASE ORAL at 08:38

## 2024-07-27 RX ADMIN — PROPRANOLOL HYDROCHLORIDE 20 MG: 20 TABLET ORAL at 14:04

## 2024-07-27 RX ADMIN — QUETIAPINE FUMARATE 25 MG: 25 TABLET ORAL at 08:37

## 2024-07-27 RX ADMIN — PROPRANOLOL HYDROCHLORIDE 20 MG: 20 TABLET ORAL at 11:36

## 2024-07-27 RX ADMIN — BUSPIRONE HYDROCHLORIDE 10 MG: 10 TABLET ORAL at 21:03

## 2024-07-27 RX ADMIN — TRAZODONE HYDROCHLORIDE 50 MG: 50 TABLET ORAL at 21:04

## 2024-07-27 RX ADMIN — ESCITALOPRAM OXALATE 20 MG: 20 TABLET ORAL at 21:03

## 2024-07-27 RX ADMIN — QUETIAPINE FUMARATE 25 MG: 50 TABLET ORAL at 21:10

## 2024-07-27 RX ADMIN — PROPRANOLOL HYDROCHLORIDE 20 MG: 20 TABLET ORAL at 21:03

## 2024-07-27 RX ADMIN — TRAZODONE HYDROCHLORIDE 50 MG: 50 TABLET ORAL at 00:04

## 2024-07-27 RX ADMIN — QUETIAPINE FUMARATE 25 MG: 25 TABLET ORAL at 14:04

## 2024-07-27 RX ADMIN — BUSPIRONE HYDROCHLORIDE 10 MG: 10 TABLET ORAL at 08:37

## 2024-07-27 NOTE — GROUP NOTE
Group Therapy Note    Date: 7/27/2024    Group Start Time: 1330  Group End Time: 1430  Group Topic: Cognitive Skills    Christa Preciado CTRS        Group Therapy Note    Attendees: 6/14     Patient's Goal:  To increase social interaction, engage in discussion r/t coping and feelings using art work, music, and discussion.         Notes:  Pt  was pleasant and cooperative, and participated fully in group. Pt was   able to engage in discussion r/t coping and feelings using art work, music and discussion.Pt shared with peers and was supportive of peers, and could relate to some of their feelings and experiences.         Status After Intervention:  Improved     Participation Level: Attentive, engaged in task and topic, supportive     Participation Quality: Appropriate ,Active Listener ,interactive, supportive     Speech:  Normal      Thought Process/Content: Logical, linear.      Affective Functioning: Congruent,brightened        Mood: Cooperative, euthymic       Level of consciousness:  Alert,  and Attentive       Response to Learning: Able to verbalize some current knowledge/experience, Able to acknowlwedge/verbalize new learning , and Progressing to goal      Endings: None Reported      Modes of Intervention: Education, Support, Socialization, and Problem-solving    Discipline Responsible: Psychoeducational Specialist      Signature:  SHALINI JOHNSON

## 2024-07-27 NOTE — PROGRESS NOTES
Daily Progress Note  7/27/2024    Patient Name: Laura Moseley    CHIEF COMPLAINT:  Depression with suicidal ideation with plan and intent to shoot self           SUBJECTIVE:      Patient is seen today for a follow up assessment.  She is found in the day room, accepted the need for privacy, moved to the unit comfort/sensory room.  She reports feeling less depressed and fleeting suicidal ideation, reports she is still at times having significant anxiety and racing thoughts.  Denies homicidal ideation Denies auditory, visual hallucinations or other perceptual disturbances.  She reports sleep is fair but awakens numerous times throughout the night, she questions whether she may have component of sleep apnea, discussed would need to evaluate this on an outpatient basis.  Also reports feeling jittery,  somewhat confused, and \"overstimulated\" in the morning.      Appetite:  [x] Adequate/Unchanged  [] Increased  [] Decreased      Sleep:       [] Adequate/Unchanged  [x] Fair  [] Poor      Group Attendance on Unit:   [] Yes   [x] Selectively    [] No    Compliant with scheduled medications: [x] Yes  [] No    Received emergency medications in past 24 hrs: [] Yes   [x] No    Medication Side Effects: Denies         Mental Status Exam  Level of consciousness: Alert and awake   Appearance: Appropriate attire for setting, seated in chair, with fair grooming and hygiene   Behavior/Motor: Approachable, engages with interviewer, no psychomotor abnormalities   Attitude toward examiner: Cooperative, attentive, fair eye contact  Speech: spontaneous, normal rate, normal volume, and well articulated   Mood: \"a little better\"  Affect: blunted  Thought processes: linear and coherent   Thought content:  Denies homicidal ideation  Suicidal Ideation: Fleeting suicidal ideation, contracts for safety on the unit.   Delusions: No evidence of delusions.   Perceptual Disturbance: Denies AVH; patient does not appear to be responding to internal

## 2024-07-28 PROCEDURE — 1240000000 HC EMOTIONAL WELLNESS R&B

## 2024-07-28 PROCEDURE — 99232 SBSQ HOSP IP/OBS MODERATE 35: CPT | Performed by: NURSE PRACTITIONER

## 2024-07-28 PROCEDURE — 6370000000 HC RX 637 (ALT 250 FOR IP): Performed by: PSYCHIATRY & NEUROLOGY

## 2024-07-28 PROCEDURE — 6370000000 HC RX 637 (ALT 250 FOR IP): Performed by: NURSE PRACTITIONER

## 2024-07-28 RX ORDER — QUETIAPINE FUMARATE 25 MG/1
25 TABLET, FILM COATED ORAL NIGHTLY
Status: DISCONTINUED | OUTPATIENT
Start: 2024-07-28 | End: 2024-07-30 | Stop reason: HOSPADM

## 2024-07-28 RX ORDER — BUSPIRONE HYDROCHLORIDE 15 MG/1
15 TABLET ORAL 2 TIMES DAILY
Status: DISCONTINUED | OUTPATIENT
Start: 2024-07-28 | End: 2024-07-30 | Stop reason: HOSPADM

## 2024-07-28 RX ADMIN — QUETIAPINE FUMARATE 25 MG: 25 TABLET ORAL at 20:41

## 2024-07-28 RX ADMIN — BUSPIRONE HYDROCHLORIDE 10 MG: 10 TABLET ORAL at 08:37

## 2024-07-28 RX ADMIN — BUSPIRONE HYDROCHLORIDE 15 MG: 15 TABLET ORAL at 20:41

## 2024-07-28 RX ADMIN — QUETIAPINE FUMARATE 25 MG: 25 TABLET ORAL at 08:37

## 2024-07-28 RX ADMIN — ESCITALOPRAM OXALATE 20 MG: 20 TABLET ORAL at 20:41

## 2024-07-28 RX ADMIN — PROPRANOLOL HYDROCHLORIDE 20 MG: 20 TABLET ORAL at 20:41

## 2024-07-28 RX ADMIN — QUETIAPINE FUMARATE 25 MG: 25 TABLET ORAL at 14:30

## 2024-07-28 RX ADMIN — PROPRANOLOL HYDROCHLORIDE 20 MG: 20 TABLET ORAL at 14:30

## 2024-07-28 RX ADMIN — PANTOPRAZOLE SODIUM 40 MG: 40 TABLET, DELAYED RELEASE ORAL at 08:37

## 2024-07-28 RX ADMIN — PROPRANOLOL HYDROCHLORIDE 20 MG: 20 TABLET ORAL at 08:37

## 2024-07-28 NOTE — GROUP NOTE
Group Therapy Note    Date: 7/28/2024    Group Start Time: 1020  Group End Time: 1050  Group Topic: Psychotherapy    [facility]    Lana Shepherd MSW        Group Therapy Note    Attendees: 3/16       Patient's Goal:  Utilize conversation prompts to reflect on various aspects of life and practice socialization    Notes:     Status After Intervention:  Improved    Participation Level: Active Listener and Interactive    Participation Quality: Appropriate, Attentive, and Sharing      Speech:  normal      Thought Process/Content: Logical  Linear      Affective Functioning: Congruent      Mood: euthymic      Level of consciousness:  Alert and Oriented x4      Response to Learning: Able to verbalize current knowledge/experience and Able to verbalize/acknowledge new learning      Endings: None Reported    Modes of Intervention: Support and Socialization      Discipline Responsible: /Counselor      Signature:  PRASHANTH Callaway

## 2024-07-28 NOTE — GROUP NOTE
Group Therapy Note    Date: 7/28/2024    Group Start Time: 1330  Group End Time: 1430  Group Topic: Relaxation    [facility] Christa Granger CTRS        Group Therapy Note    Attendees: 7/16     Patient's Goal:  To increase social interaction, practice relaxation and self calming   using art work, music, and discussion.         Notes:  Pt  was pleasant and cooperative, and participated fully in group. Pt was   able to practice relaxation and self calming using art work, music, and discussion.  .  Pt shared with peers and was supportive of peers.         Status After Intervention:  Improved     Participation Level: Attentive, engaged in task and topic, supportive     Participation Quality: Appropriate ,Active Listener ,interactive, supportive     Speech:  Normal      Thought Process/Content: Logical, linear.      Affective Functioning: Congruent,brightened        Mood: Cooperative, euthymic       Level of consciousness:  Alert,  and Attentive       Response to Learning: Able to verbalize some current knowledge/experience,   Able to acknowlwedge/verbalize new learning , and Progressing to goal      Endings: None Reported      Modes of Intervention: Education, Support, Socialization, and Problem-solving    Discipline Responsible: Psychoeducational Specialist      Signature:  SHALINI JOHNSON

## 2024-07-28 NOTE — PROGRESS NOTES
Daily Progress Note  7/28/2024    Patient Name: Laura Moseley    CHIEF COMPLAINT:  Depression with suicidal ideation with plan and intent to shoot self           SUBJECTIVE:      Patient is seen today for a follow up assessment.  She is found in the day room, except the need for privacy, moved to the comfort/sensory room.  She reports fair sleep however states upon waking she feels confused, \"jittery and overstimulated\", she questioned if night dose of Seroquel is too high.  Reports feeling less depressed however still struggling with significant level of anxiety.  Reports fleeting suicidal ideation.  Denies homicidal ideation.  Denies auditory, visual hallucinations or other perceptual disturbances.  No evidence of delusions.  Denies paranoia.  Remains compliant with taking scheduled psychotropic medication and denies adverse effects.  Per unit nursing staff patient maintains behavioral with no need for emergency medication for the past 24 hours.    Appetite:  [x] Adequate/Unchanged  [] Increased  [] Decreased      Sleep:       [] Adequate/Unchanged  [x] Fair  [] Poor      Group Attendance on Unit:   [] Yes   [x] Selectively    [] No    Compliant with scheduled medications: [x] Yes  [] No    Received emergency medications in past 24 hrs: [] Yes   [x] No    Medication Side Effects: Denies         Mental Status Exam  Level of consciousness: Alert and awake   Appearance: Appropriate attire for setting, seated in chair, with fair grooming and hygiene   Behavior/Motor: Approachable, engages with interviewer, no psychomotor abnormalities   Attitude toward examiner: Cooperative, attentive, fair eye contact  Speech: spontaneous, normal rate, normal volume, and well articulated   Mood: anxious  Affect: Euthymic   thought processes: linear and coherent   Thought content:  Denies homicidal ideation  Suicidal Ideation: Fleeting suicidal ideation, contracts for safety on the unit.   Delusions: No evidence of delusions.

## 2024-07-29 PROCEDURE — 6370000000 HC RX 637 (ALT 250 FOR IP): Performed by: NURSE PRACTITIONER

## 2024-07-29 PROCEDURE — 6370000000 HC RX 637 (ALT 250 FOR IP): Performed by: PSYCHIATRY & NEUROLOGY

## 2024-07-29 PROCEDURE — 99232 SBSQ HOSP IP/OBS MODERATE 35: CPT | Performed by: PSYCHIATRY & NEUROLOGY

## 2024-07-29 PROCEDURE — 90833 PSYTX W PT W E/M 30 MIN: CPT | Performed by: PSYCHIATRY & NEUROLOGY

## 2024-07-29 PROCEDURE — APPSS30 APP SPLIT SHARED TIME 16-30 MINUTES: Performed by: NURSE PRACTITIONER

## 2024-07-29 PROCEDURE — 1240000000 HC EMOTIONAL WELLNESS R&B

## 2024-07-29 RX ORDER — LANOLIN ALCOHOL/MO/W.PET/CERES
1.5 CREAM (GRAM) TOPICAL NIGHTLY
Status: DISCONTINUED | OUTPATIENT
Start: 2024-07-29 | End: 2024-07-30 | Stop reason: HOSPADM

## 2024-07-29 RX ADMIN — QUETIAPINE FUMARATE 25 MG: 25 TABLET ORAL at 08:57

## 2024-07-29 RX ADMIN — ESCITALOPRAM OXALATE 20 MG: 20 TABLET ORAL at 21:22

## 2024-07-29 RX ADMIN — QUETIAPINE FUMARATE 25 MG: 25 TABLET ORAL at 15:54

## 2024-07-29 RX ADMIN — PROPRANOLOL HYDROCHLORIDE 20 MG: 20 TABLET ORAL at 08:58

## 2024-07-29 RX ADMIN — BUSPIRONE HYDROCHLORIDE 15 MG: 15 TABLET ORAL at 08:58

## 2024-07-29 RX ADMIN — PROPRANOLOL HYDROCHLORIDE 20 MG: 20 TABLET ORAL at 21:22

## 2024-07-29 RX ADMIN — QUETIAPINE FUMARATE 25 MG: 25 TABLET ORAL at 21:21

## 2024-07-29 RX ADMIN — PROPRANOLOL HYDROCHLORIDE 20 MG: 20 TABLET ORAL at 15:54

## 2024-07-29 RX ADMIN — BUSPIRONE HYDROCHLORIDE 15 MG: 15 TABLET ORAL at 21:22

## 2024-07-29 RX ADMIN — Medication 1.5 MG: at 21:21

## 2024-07-29 RX ADMIN — PANTOPRAZOLE SODIUM 40 MG: 40 TABLET, DELAYED RELEASE ORAL at 08:58

## 2024-07-29 NOTE — GROUP NOTE
Group Therapy Note    Date: 7/29/2024    Group Start Time: 1015  Group End Time: 1050  Group Topic: Psychoeducation    Michele Khan        Group Therapy Note    Attendees: 6/20     Patient was offered group therapy today but declined to participate despite encouragement from staff. 1:1 was offered.    Signature:  Michele Wright

## 2024-07-29 NOTE — GROUP NOTE
Group Therapy Note    Date: 7/29/2024    Group Start Time: 1430  Group End Time: 1540  Group Topic: Cognitive Skills    Christa Preciado CTRS        Group Therapy Note    Attendees: 5/20     Patient's Goal:  To increase social interaction, practice decision making skills, explore perspective   and engage in recovery discussion.         Notes:  Pt  was pleasant and cooperative, and participated fully in group. Pt was   able to practice decision making skills, explore perspective and engage in recovery discussion.    Pt shared and related to peers.      Status After Intervention:  Improved     Participation Level: Attentive, engaged in task and topic, supportive     Participation Quality: Appropriate ,Active Listener ,interactive, supportive     Speech:  Normal      Thought Process/Content: Logical, linear.      Affective Functioning: Congruent,brightened        Mood: Cooperative, euthymic       Level of consciousness:  Alert,  and Attentive       Response to Learning: Able to verbalize some current knowledge/experience,   Able to acknowlwedge/verbalize new learning , and Progressing to goal      Endings: None Reported      Modes of Intervention: Education, Support, Socialization, and Problem-solving    Discipline Responsible: Psychoeducational Specialist      Signature:  SHALINI JOHNSON

## 2024-07-29 NOTE — PROGRESS NOTES
Daily Progress Note  7/29/2024    Patient Name: Laura Moseley    CHIEF COMPLAINT:  Depression with suicidal ideation with plan and intent to shoot self           SUBJECTIVE:      Patient is seen today for a follow up assessment.  She is found in the day room, accepted the need for privacy, moved to the comfort/sensory room.  Patient feels mood is improving, reports she feels less depressed, reports at times she feels anxious however is manageable.  Reports she is no longer having suicidal ideation.  Denies homicidal ideation.  Denies auditory, visual hallucinations or other perceptual disturbances.  No evidence of delusions.  Denies paranoia.  Reports sleep sleep is fair, she does want to follow up with her PCP and inquire about having sleep study.  Denies problems with appetite.  Remains compliant with taking scheduled psychotropic medication and denies adverse effect.  Per unit nursing staff patient maintains behavior control with no need for emergency medication for the past 24 hours.    Appetite:  [x] Adequate/Unchanged  [] Increased  [] Decreased      Sleep:       [] Adequate/Unchanged  [x] Fair  [] Poor      Group Attendance on Unit:   [] Yes   [x] Selectively    [] No    Compliant with scheduled medications: [x] Yes  [] No    Received emergency medications in past 24 hrs: [] Yes   [x] No    Medication Side Effects: Denies         Mental Status Exam  Level of consciousness: Alert and awake   Appearance: Appropriate attire for setting, seated in chair, with fair grooming and hygiene   Behavior/Motor: Approachable, engages with interviewer, no psychomotor abnormalities   Attitude toward examiner: Cooperative, attentive, good eye contact  Speech: spontaneous, normal rate, normal volume, and well articulated   Mood: per patient \"better\"  Affect: Euthymic   thought processes: linear, coherent, goal directed  Thought content:  Denies homicidal ideation  Suicidal Ideation: Denies suicidal ideation   Delusions:

## 2024-07-29 NOTE — GROUP NOTE
Group Therapy Note    Date: 7/29/2024    Group Start Time: 1015  Group End Time: 1050  Group Topic: Psychoeducation    Michlee Khan        Group Therapy Note    Attendees: 6/20       Patient's Goal:    PT will demonstrate increased interpersonal interaction and participate in group activities of discussing coping skills.     Notes:  Patient is making progress, AEB participating in group discussion, actively listening, and supporting other group members.    Status After Intervention:  Improved    Participation Level: Active Listener and Interactive    Participation Quality: Appropriate, Attentive, and Sharing      Speech:  normal      Thought Process/Content: Logical      Affective Functioning: Congruent      Mood: euthymic      Level of consciousness:  Alert, Oriented x4, and Attentive      Response to Learning: Able to verbalize/acknowledge new learning and Progressing to goal      Endings: None Reported    Modes of Intervention: Education, Support, and Socialization      Discipline Responsible: /Counselor      Signature:  Michele Wright

## 2024-07-29 NOTE — GROUP NOTE
Group Therapy Note    Date: 7/29/2024    Group Start Time: 0905  Group End Time: 0940  Group Topic: Community Meeting    Kavya Gonogra LPN        Group Therapy Note    Attendees: 7/20       Patient's Goal:  work on/use coping skills      Status After Intervention:  Improved    Participation Level: Active Listener    Participation Quality: Appropriate, Attentive, Sharing, and Supportive      Speech:  normal      Thought Process/Content: Logical      Affective Functioning: Congruent      Mood:  WNL      Level of consciousness:  Alert, Oriented x4, and Attentive      Response to Learning: Able to verbalize current knowledge/experience, Able to verbalize/acknowledge new learning, and Able to retain information      Endings: None Reported    Modes of Intervention: Education, Support, and Socialization      Discipline Responsible: Licensed Practical Nurse      Signature:  Kavya Pritchett LPN

## 2024-07-30 VITALS
WEIGHT: 293 LBS | OXYGEN SATURATION: 100 % | RESPIRATION RATE: 16 BRPM | TEMPERATURE: 97.5 F | HEIGHT: 68 IN | DIASTOLIC BLOOD PRESSURE: 76 MMHG | BODY MASS INDEX: 44.41 KG/M2 | HEART RATE: 87 BPM | SYSTOLIC BLOOD PRESSURE: 137 MMHG

## 2024-07-30 PROCEDURE — 99239 HOSP IP/OBS DSCHRG MGMT >30: CPT | Performed by: PSYCHIATRY & NEUROLOGY

## 2024-07-30 PROCEDURE — 6370000000 HC RX 637 (ALT 250 FOR IP): Performed by: NURSE PRACTITIONER

## 2024-07-30 PROCEDURE — 6370000000 HC RX 637 (ALT 250 FOR IP): Performed by: PSYCHIATRY & NEUROLOGY

## 2024-07-30 RX ORDER — PROPRANOLOL HYDROCHLORIDE 20 MG/1
20 TABLET ORAL 3 TIMES DAILY
Qty: 90 TABLET | Refills: 0 | Status: SHIPPED | OUTPATIENT
Start: 2024-07-30

## 2024-07-30 RX ORDER — BUSPIRONE HYDROCHLORIDE 15 MG/1
15 TABLET ORAL 2 TIMES DAILY
Qty: 60 TABLET | Refills: 0 | Status: SHIPPED | OUTPATIENT
Start: 2024-07-30

## 2024-07-30 RX ORDER — QUETIAPINE FUMARATE 25 MG/1
25 TABLET, FILM COATED ORAL 3 TIMES DAILY
Qty: 90 TABLET | Refills: 0 | Status: SHIPPED | OUTPATIENT
Start: 2024-07-30

## 2024-07-30 RX ORDER — LANOLIN ALCOHOL/MO/W.PET/CERES
1.5 CREAM (GRAM) TOPICAL NIGHTLY
Qty: 30 TABLET | Refills: 0 | Status: SHIPPED | OUTPATIENT
Start: 2024-07-30

## 2024-07-30 RX ADMIN — QUETIAPINE FUMARATE 25 MG: 25 TABLET ORAL at 08:39

## 2024-07-30 RX ADMIN — BUSPIRONE HYDROCHLORIDE 15 MG: 15 TABLET ORAL at 08:39

## 2024-07-30 RX ADMIN — PROPRANOLOL HYDROCHLORIDE 20 MG: 20 TABLET ORAL at 08:39

## 2024-07-30 RX ADMIN — PANTOPRAZOLE SODIUM 40 MG: 40 TABLET, DELAYED RELEASE ORAL at 08:39

## 2024-07-30 NOTE — GROUP NOTE
Group Therapy Note    Date: 7/30/2024    Group Start Time: 0915  Group End Time: 0945  Group Topic: Nursing    [facility] Raina Herbert, RN        Group Therapy Note    Attendees: 7/19           Notes:  Patient encouraged to participate in group. Patient declined participation at this time.     Staff  Signature:  RAINA ROMERO RN

## 2024-07-30 NOTE — CARE COORDINATION
Name: Laura Moseley    : 2000    Auth number: [id number]     Discharge Date: 2024    Destination: home    *If you have any specific discharge questions, please contact the assigned /discharge planner: Cyndy ([phone]) or Nya ([phone])      Discharge Medications:      Medication List        START taking these medications      melatonin 3 MG Tabs tablet  Take 0.5 tablets by mouth at bedtime  Notes to patient: Anti insomnia      propranolol 20 MG tablet  Commonly known as: INDERAL  Take 1 tablet by mouth 3 times daily  Notes to patient: Anti hypertensive      QUEtiapine 25 MG tablet  Commonly known as: SEROQUEL  Take 1 tablet by mouth 3 times daily  Notes to patient: Mood stabilizer             CHANGE how you take these medications      busPIRone 15 MG tablet  Commonly known as: BUSPAR  Take 15 mg by mouth 2 times daily  What changed:   medication strength  how much to take  Notes to patient: Mood stabilizer             CONTINUE taking these medications      escitalopram 20 MG tablet  Commonly known as: LEXAPRO  TAKE 1 TABLET BY MOUTH EVERY DAY AT NIGHT  Notes to patient: Mood stabilizer      omeprazole 40 MG delayed release capsule  Commonly known as: PRILOSEC  TAKE 1 CAPSULE BY MOUTH EVERY DAY  Notes to patient: Relieves heartburn             STOP taking these medications      ALPRAZolam 1 MG tablet  Commonly known as: Xanax               Where to Get Your Medications        These medications were sent to [id number] - [address] -  [phone] - F [phone]  [address] [place] [id number]      Phone: [phone]   busPIRone 15 MG tablet  melatonin 3 MG Tabs tablet  propranolol 20 MG tablet  QUEtiapine 25 MG tablet         Follow Up Appointment: [facility]  [address],   [place] [address]  [phone]  F: [phone]  Follow up on 2024  You have a mental health appointment at 11:00

## 2024-07-30 NOTE — DISCHARGE INSTRUCTIONS
Information:  Medications:   Medication summary provided   I understand that I should take only the medications on my list.     -why and when I need to take each medicine.     -which side effects to watch for.     -that I should carry my medication information at all times in case of     Emergency situations.    I will take all of my medicines to follow up appointments.     -check with my physician or pharmacist before taking any new    Medication, over the counter product or drink alcohol.    -Ask about food, drug or dietary supplement interactions.    -discard old lists and update records with medication providers.    Notify Physician:  Notify physician if you notice:   Always call 911 if you feel your life is in danger  In case of an emergency call 911 immediately!  If 911 is not available call your local emergency medical system for help    Behavioral Health Follow Up:  Original Referral Source:[facility]  Discharge Diagnosis: Depression with suicidal ideation [F32.A, R45.851]  Recommendations for Level of Care: Take medications as prescribed. Keep all follow up appointments   Patient status at discharge: Stable. Alert and oriented   My hospital  was: [facility]  Aftercare plan faxed: Damas[facility]   -faxed by: Staff   -date: 7/30/2024   -time: 1100  Prescriptions: Sent with patient     Smoking: Quit Smoking.   Call the [employer]'s smoking quitline at 4-836-56K-QUIT  Know the signs of a heart attack   If you have any of the following symptoms call 911 immediately, do not wait more    Than five minutes.    1. Pressure, fullness and/ or squeezing in the center of the chest spreading to    The jaw, neck or shoulder.    2. Chest discomfort with light headedness, fainting, sweating, nausea or    Shortness of breath.   3. Upper abdominal pressure or discomfort.   4. Lower chest pain, back pain, unusual fatigue, shortness of breath, nausea   Or dizziness.     General Information:   Questions regarding your bill: Call

## 2024-07-30 NOTE — DISCHARGE SUMMARY
denies any perceptual disturbance  Cognition:  Intact  Memory: age appropriate  Insight & Judgement: fair  Medication side effects: denies     Disposition: home    Patient Instructions:   Activity: activity as tolerated  1. Patient instructed to take medications regularly and follow up with outpatient appointments.     Follow-up as scheduled with outpatient [facility]      Signed:    Electronically signed by KEL LÓPEZ MD on 7/30/24 at 4:41 PM EDT    Time Spent on discharge is more than 30 minutes in the examination, evaluation, counseling and review of medications and discharge plan.

## 2024-07-30 NOTE — TRANSITION OF CARE
Behavioral Health Transition Record    Patient Name: Laura Moseley  YOB: 2000   Medical Record Number: [id number]  Date of Admission: 7/24/2024  8:53 AM   Date of Discharge: 7/30/24    Attending Provider: No att. providers found   Discharging Provider: HASMUKH Garrison  To contact this individual call [phone] and ask the  to page.  If unavailable, ask to be transferred to Behavioral Health Provider on call.  A Behavioral Health Provider will be available on call 24/7 and during holidays.    Primary Care Provider: Ananth Bella MD    Allergies   Allergen Reactions    Hydroxyzine Hcl Other (See Comments)     Panic attack 1 time, pt tolerates otherwise       Reason for Admission: Grabbed a handful of pills W/ thoughts to OD,Also thoughts to use BF gun that was on the dresse     Admission Diagnosis: Depression with suicidal ideation [F32.A, R45.851]    * No surgery found *    Results for orders placed or performed during the hospital encounter of 07/24/24   Urinalysis with Reflex to Culture    Specimen: Urine   Result Value Ref Range    Color, UA Yellow Yellow    Turbidity UA Clear Clear    Glucose, Ur NEGATIVE NEGATIVE mg/dL    Bilirubin, Urine NEGATIVE NEGATIVE    Ketones, Urine NEGATIVE NEGATIVE mg/dL    Specific Gravity, UA 1.019 1.000 - 1.030    Urine Hgb NEGATIVE NEGATIVE    pH, Urine 5.5 5.0 - 8.0    Protein, UA NEGATIVE NEGATIVE mg/dL    Urobilinogen, Urine Normal 0.0 - 1.0 EU/dL    Nitrite, Urine NEGATIVE NEGATIVE    Leukocyte Esterase, Urine NEGATIVE NEGATIVE    Comment       Microscopic exam not performed based on chemical results unless requested in original order.   TSH WITH REFLEX   Result Value Ref Range    TSH 1.50 0.30 - 5.00 uIU/mL       Immunizations administered during this encounter:   Immunization History   Administered Date(s) Administered    COVID-19, J&J, (age 18y+), IM, 0.5 mL 04/10/2021    COVID-19, PFIZER GRAY top, DO NOT Dilute, (age 12 y+), IM, 30 mcg/0.3 mL 
       Advanced Directive:   Does the patient have an appointed surrogate decision maker? No  Does the patient have a Medical Advance Directive? No  Does the patient have a Psychiatric Advance Directive? No  If the patient does not have a surrogate or Medical Advance Directive AND Psychiatric Advance Directive, the patient was offered information on these advance directives Patient will complete at a later time    Patient Instructions: Please continue all medications until otherwise directed by physicianSulema Garrison     Tobacco Cessation Discharge Plan:   Is the patient a tobacco user  and needs referral for tobacco cessation? Yes  Patient referred to the following for tobacco cessation with an appointment? Patient refused  Patient was offered medication to assist with tobacco cessation at discharge? Patient refused    Alcohol/Substance Abuse Discharge Plan:   Does the patient have a history of substance/alcohol abuse and requires a referral for treatment? No  Patient referred to the following for substance/alcohol abuse treatment with an appointment? No  Patient was offered medication to assist with substance/alcohol abuse cessation at discharge? No      Patient discharged to: Home; Transition record discussed with patient/caregiver and provided this record in hard copy or electronically

## 2024-07-30 NOTE — PLAN OF CARE
Problem: Self Harm/Suicidality  Goal: Will have no self-injury during hospital stay  Description: INTERVENTIONS:  1.  Ensure constant observer at bedside with Q15M safety checks  2.  Maintain a safe environment  3.  Secure patient belongings  4.  Ensure family/visitors adhere to safety recommendations  5.  Ensure safety tray has been added to patient's diet order  6.  Every shift and PRN: Re-assess suicidal risk via Frequent Screener    7/25/2024 2109 by Ananth Koo, RN  Outcome: Progressing  Patient has made no attempt to harm self at this time.      Problem: Depression  Goal: Will be euthymic at discharge  Description: INTERVENTIONS:  1. Administer medication as ordered  2. Provide emotional support via 1:1 interaction with staff  3. Encourage involvement in milieu/groups/activities  4. Monitor for social isolation  7/25/2024 2109 by Ananth Koo, RN  Outcome: Progressing  Patient reports depression and anxiety symptoms are improving. She is pleasant and cooperative and is developing some insight. Patient spends most of shift in dayroom, socializing with peers. Patient denies suicidal ideation, homicidal ideation and hallucinations at this time.  Safety plan reviewed with patient, agrees to approach staff when feeling upset.  15 minute and random checks maintained for safety.  No violent or escalating behaviors noted during this shift. Patient is currently calm, controlled and medication-compliant.        Problem: Sleep Disturbance  Goal: Will exhibit normal sleeping pattern  Description: INTERVENTIONS:  1. Administer medication as ordered  2. Decrease environmental stimuli, including noise, as appropriate  3. Discourage social isolation and naps during the day  7/25/2024 2109 by Ananth Koo, RN  Outcome: Progressing  Patient reports struggling with sleep, hoping to sleep better tonight.     
  Problem: Self Harm/Suicidality  Goal: Will have no self-injury during hospital stay  Description: INTERVENTIONS:  1.  Ensure constant observer at bedside with Q15M safety checks  2.  Maintain a safe environment  3.  Secure patient belongings  4.  Ensure family/visitors adhere to safety recommendations  5.  Ensure safety tray has been added to patient's diet order  6.  Every shift and PRN: Re-assess suicidal risk via Frequent Screener    7/26/2024 1546 by Eugenia Rojas RN  Outcome: Progressing  Note: Ms. Moseley was crying and anxious following breakfast this morning. Writer met with Ms. Moseley 1:1 in her room. She expressed she feels as though she has \"lost control\" and \"just wants it to stop before I do something\". When asked, she did deny thoughts to cause harm to herself and reports she has never done so in the past.      Problem: Depression  Goal: Will be euthymic at discharge  Description: INTERVENTIONS:  1. Administer medication as ordered  2. Provide emotional support via 1:1 interaction with staff  3. Encourage involvement in milieu/groups/activities  4. Monitor for social isolation  7/26/2024 1602 by Eugenia Rojas, MACY  Note: Ms. Moseley reports feeling hopeless and helpless in the morning after breakfast.      Problem: Sleep Disturbance  Goal: Will exhibit normal sleeping pattern  Description: INTERVENTIONS:  1. Administer medication as ordered  2. Decrease environmental stimuli, including noise, as appropriate  3. Discourage social isolation and naps during the day  7/26/2024 1602 by Eugenia Rojas, MACY  Note: Ms. Moseley reports broken sleep pattern and notes she was up after hearing an unexplainable noise. She reports waking feeling confused and concerned about the noise she heard. She reports her thoughts being all \"messed up\". She reports coming out to talk to staff, then falling back to sleep after returning to her room.      
  Problem: Self Harm/Suicidality  Goal: Will have no self-injury during hospital stay  Description: INTERVENTIONS:  1.  Ensure constant observer at bedside with Q15M safety checks  2.  Maintain a safe environment  3.  Secure patient belongings  4.  Ensure family/visitors adhere to safety recommendations  5.  Ensure safety tray has been added to patient's diet order  6.  Every shift and PRN: Re-assess suicidal risk via Frequent Screener    7/27/2024 0016 by Ananth Koo, RN  Outcome: Progressing  Patient has made no attempt to harm self at this time.      Problem: Depression  Goal: Will be euthymic at discharge  Description: INTERVENTIONS:  1. Administer medication as ordered  2. Provide emotional support via 1:1 interaction with staff  3. Encourage involvement in milieu/groups/activities  4. Monitor for social isolation  7/27/2024 0016 by Ananth Koo, RN  Outcome: Progressing  Patient states feeling better but continues to have problems dealing with anxiety effectively on her own. She is somewhat helpless rather than employ coping skills that we have talked about. She seems to just give in to the anxiety. Patient denies suicidal ideation, homicidal ideation and hallucinations at this time.  Safety plan reviewed with patient, agrees to approach staff when feeling upset.  15 minute and random checks maintained for safety.  No violent or escalating behaviors noted during this shift. Patient is currently calm, controlled and medication-compliant.      Problem: Sleep Disturbance  Goal: Will exhibit normal sleeping pattern  Description: INTERVENTIONS:  1. Administer medication as ordered  2. Decrease environmental stimuli, including noise, as appropriate  3. Discourage social isolation and naps during the day  7/27/2024 0016 by Ananth Koo, RN  Outcome: Progressing  Patient continues to experience interrupted sleep.     
  Problem: Self Harm/Suicidality  Goal: Will have no self-injury during hospital stay  Description: INTERVENTIONS:  1.  Ensure constant observer at bedside with Q15M safety checks  2.  Maintain a safe environment  3.  Secure patient belongings  4.  Ensure family/visitors adhere to safety recommendations  5.  Ensure safety tray has been added to patient's diet order  6.  Every shift and PRN: Re-assess suicidal risk via Frequent Screener    7/28/2024 0019 by Meg Cortes LPN  Outcome: Progressing  Flowsheets (Taken 7/28/2024 0018)  Will have no self-injury during hospital stay:   Ensure constant observer at bedside with Q15M safety checks   Maintain a safe environment   Secure patient belongings   Every shift and PRN: Re-assess suicidal risk via Frequent Screener  Note: Patient denies thoughts of self harm, or suicidal ideation at this time. Continue 15 minute safety checks, and to monitor the unit for potentially hazardous contraband.      Problem: Depression  Goal: Will be euthymic at discharge  Description: INTERVENTIONS:  1. Administer medication as ordered  2. Provide emotional support via 1:1 interaction with staff  3. Encourage involvement in milieu/groups/activities  4. Monitor for social isolation  7/28/2024 0019 by Meg Cortes LPN  Outcome: Progressing  Note: Patient with brightened affect, out in day room, and social with peers. Cooperative and friendly with staff, as well as relatively social.  Patient is compliant with all medications.  Has not had any incidences of crying during shift.     
  Problem: Self Harm/Suicidality  Goal: Will have no self-injury during hospital stay  Description: INTERVENTIONS:  1.  Ensure constant observer at bedside with Q15M safety checks  2.  Maintain a safe environment  3.  Secure patient belongings  4.  Ensure family/visitors adhere to safety recommendations  5.  Ensure safety tray has been added to patient's diet order  6.  Every shift and PRN: Re-assess suicidal risk via Frequent Screener    7/29/2024 0057 by Meg Cortes LPN  Outcome: Progressing  Flowsheets (Taken 7/28/2024 0018)  Will have no self-injury during hospital stay:   Ensure constant observer at bedside with Q15M safety checks   Maintain a safe environment   Secure patient belongings   Every shift and PRN: Re-assess suicidal risk via Frequent Screener  Note: Patient denies thoughts of self harm, or suicidal ideation at this time. Continue 15 minute safety checks, and to monitor the unit for potentially hazardous contraband.     Problem: Depression  Goal: Will be euthymic at discharge  Description: INTERVENTIONS:  1. Administer medication as ordered  2. Provide emotional support via 1:1 interaction with staff  3. Encourage involvement in milieu/groups/activities  4. Monitor for social isolation  7/29/2024 0057 by Meg Cortes LPN  Outcome: Progressing  Note: Patient with brightened affect today, out and social in the day room milieu.  Compliant with all behavioral health medications, and verbalizes understanding of needing to continue with medications, and therapy treatment outpatient upon discharge.  Patient states that she feels competent to take her medications on her current schedules outpatient, despite multiple administration times.      
  Problem: Self Harm/Suicidality  Goal: Will have no self-injury during hospital stay  Description: INTERVENTIONS:  1.  Ensure constant observer at bedside with Q15M safety checks  2.  Maintain a safe environment  3.  Secure patient belongings  4.  Ensure family/visitors adhere to safety recommendations  5.  Ensure safety tray has been added to patient's diet order  6.  Every shift and PRN: Re-assess suicidal risk via Frequent Screener    7/29/2024 1205 by Kassidy Rocha LPN  Outcome: Progressing   Patient denies thoughts of self harm   Problem: Depression  Goal: Will be euthymic at discharge  Description: INTERVENTIONS:  1. Administer medication as ordered  2. Provide emotional support via 1:1 interaction with staff  3. Encourage involvement in milieu/groups/activities  4. Monitor for social isolation  7/29/2024 1205 by Kassidy Rocha LPN  Outcome: Progressing     Problem: Sleep Disturbance  Goal: Will exhibit normal sleeping pattern  Description: INTERVENTIONS:  1. Administer medication as ordered  2. Decrease environmental stimuli, including noise, as appropriate  3. Discourage social isolation and naps during the day  Outcome: Progressing     
  Problem: Self Harm/Suicidality  Goal: Will have no self-injury during hospital stay  Description: INTERVENTIONS:  1.  Ensure constant observer at bedside with Q15M safety checks  2.  Maintain a safe environment  3.  Secure patient belongings  4.  Ensure family/visitors adhere to safety recommendations  5.  Ensure safety tray has been added to patient's diet order  6.  Every shift and PRN: Re-assess suicidal risk via Frequent Screener    7/29/2024 1205 by Kassidy Rocha LPN  Outcome: Progressing   Patient denies thoughts of self harm   Problem: Depression  Goal: Will be euthymic at discharge  Description: INTERVENTIONS:  1. Administer medication as ordered  2. Provide emotional support via 1:1 interaction with staff  3. Encourage involvement in milieu/groups/activities  4. Monitor for social isolation  7/29/2024 1205 by Kassidy Rocha LPN  Outcome: Progressing   Miss Moseley is seen in the dayroom, affect is flat, she is social with peers. Reports that her sleep remains poor and feels \"off still\" she feels that if her sleep would improve that would help her mood. No9 loud emotional  outbursts this morning.    Problem: Sleep Disturbance  Goal: Will exhibit normal sleeping pattern  Description: INTERVENTIONS:  1. Administer medication as ordered  2. Decrease environmental stimuli, including noise, as appropriate  3. Discourage social isolation and naps during the day  Outcome: Progressing   Patient reports poor sleep last night.   
  Problem: Self Harm/Suicidality  Goal: Will have no self-injury during hospital stay  Description: INTERVENTIONS:  1.  Ensure constant observer at bedside with Q15M safety checks  2.  Maintain a safe environment  3.  Secure patient belongings  4.  Ensure family/visitors adhere to safety recommendations  5.  Ensure safety tray has been added to patient's diet order  6.  Every shift and PRN: Re-assess suicidal risk via Frequent Screener    7/30/2024 0056 by Meg Cortes LPN  Outcome: Progressing  Flowsheets (Taken 7/30/2024 0056)  Will have no self-injury during hospital stay:   Ensure constant observer at bedside with Q15M safety checks   Maintain a safe environment   Secure patient belongings   Every shift and PRN: Re-assess suicidal risk via Frequent Screener  Note: Patient denies thoughts of self harm, or suicidal ideation at this time. Continue 15 minute safety checks, and to monitor the unit for potentially hazardous contraband.        Problem: Sleep Disturbance  Goal: Will exhibit normal sleeping pattern  Description: INTERVENTIONS:  1. Administer medication as ordered  2. Decrease environmental stimuli, including noise, as appropriate  3. Discourage social isolation and naps during the day  7/30/2024 0056 by Meg Cortes LPN  Outcome: Progressing  Note: Patient compliant with 1.5 mg of Melatonin before bed.  No other sleep aids administered.     Problem: Depression  Goal: Will be euthymic at discharge  Description: INTERVENTIONS:  1. Administer medication as ordered  2. Provide emotional support via 1:1 interaction with staff  3. Encourage involvement in milieu/groups/activities  4. Monitor for social isolation  7/30/2024 0056 by Meg Cortes LPN  Outcome: Progressing  Note: Patient with brightened affect today, social with peers, out in day room.  Patient is potentially being discharged tomorrow, and when asked if she felt like she was ready, patient responded \"I might be.\"  When asked to elaborate, 
  Problem: Self Harm/Suicidality  Goal: Will have no self-injury during hospital stay  Description: INTERVENTIONS:  1.  Ensure constant observer at bedside with Q15M safety checks  2.  Maintain a safe environment  3.  Secure patient belongings  4.  Ensure family/visitors adhere to safety recommendations  5.  Ensure safety tray has been added to patient's diet order  6.  Every shift and PRN: Re-assess suicidal risk via Frequent Screener    Note: Ms. Moseley denies suicidal ideation and thoughts of harm to self and others.      Problem: Depression  Goal: Will be euthymic at discharge  Description: INTERVENTIONS:  1. Administer medication as ordered  2. Provide emotional support via 1:1 interaction with staff  3. Encourage involvement in milieu/groups/activities  4. Monitor for social isolation  Note: Ms. Moseley reports feeling better today. She was tearful shortly after breakfast, and was able to recover with minimal staff intervention. She is social and friendly with peers.      Problem: Sleep Disturbance  Goal: Will exhibit normal sleeping pattern  Description: INTERVENTIONS:  1. Administer medication as ordered  2. Decrease environmental stimuli, including noise, as appropriate  3. Discourage social isolation and naps during the day  Note: Ms. Moseley reports sleeping better last night.      
  Problem: Self Harm/Suicidality  Goal: Will have no self-injury during hospital stay  Description: INTERVENTIONS:  1.  Ensure constant observer at bedside with Q15M safety checks  2.  Maintain a safe environment  3.  Secure patient belongings  4.  Ensure family/visitors adhere to safety recommendations  5.  Ensure safety tray has been added to patient's diet order  6.  Every shift and PRN: Re-assess suicidal risk via Frequent Screener    Outcome: Progressing   Patient reports fleeting suicidal thoughts that are worse in the morning. Agrees to seek out staff if feelings become overwhelming  Problem: Depression  Goal: Will be euthymic at discharge  Description: INTERVENTIONS:  1. Administer medication as ordered  2. Provide emotional support via 1:1 interaction with staff  3. Encourage involvement in milieu/groups/activities  4. Monitor for social isolation  Outcome: Progressing   MISS Reis is seen in the dayroom, this morning she was tearful and reported she was having a panic attack, New order was given for Seroquel 25 mg bid daily. Patient was so upset it took writer several tries before she finally took medications. Improved mood in the afternoon, out and social.   Problem: Sleep Disturbance  Goal: Will exhibit normal sleeping pattern  Description: INTERVENTIONS:  1. Administer medication as ordered  2. Decrease environmental stimuli, including noise, as appropriate  3. Discourage social isolation and naps during the day  Outcome: Progressing   Patient reports good sleep  
  Problem: Self Harm/Suicidality  Goal: Will have no self-injury during hospital stay  Description: INTERVENTIONS:  1.  Ensure constant observer at bedside with Q15M safety checks  2.  Maintain a safe environment  3.  Secure patient belongings  4.  Ensure family/visitors adhere to safety recommendations  5.  Ensure safety tray has been added to patient's diet order  6.  Every shift and PRN: Re-assess suicidal risk via Frequent Screener    Outcome: Progressing  Note: Denies thoughts of self harm this shift 15 min safety checks continue      Problem: Depression  Goal: Will be euthymic at discharge  Description: INTERVENTIONS:  1. Administer medication as ordered  2. Provide emotional support via 1:1 interaction with staff  3. Encourage involvement in milieu/groups/activities  4. Monitor for social isolation  Outcome: Progressing  Note: Patient is initially worried and anxious at beginning of shift, patient appears to have brightened and just voices concerns with feeling good most the night. Writer educated patient on side effects of medications ordered, patient showers to help relax.     Problem: Sleep Disturbance  Goal: Will exhibit normal sleeping pattern  Description: INTERVENTIONS:  1. Administer medication as ordered  2. Decrease environmental stimuli, including noise, as appropriate  3. Discourage social isolation and naps during the day  Outcome: Progressing  Note: Reports no concerns with sleeping but increased anxiety when waking up.      
  Problem: Self Harm/Suicidality  Goal: Will have no self-injury during hospital stay  Description: INTERVENTIONS:  1.  Ensure constant observer at bedside with Q15M safety checks  2.  Maintain a safe environment  3.  Secure patient belongings  4.  Ensure family/visitors adhere to safety recommendations  5.  Ensure safety tray has been added to patient's diet order  6.  Every shift and PRN: Re-assess suicidal risk via Frequent Screener    Outcome: Progressing  Note: Patient denied thoughts of hurting herself or others during assessment. She remains free from self harm and injury     Problem: Depression  Goal: Will be euthymic at discharge  Description: INTERVENTIONS:  1. Administer medication as ordered  2. Provide emotional support via 1:1 interaction with staff  3. Encourage involvement in milieu/groups/activities  4. Monitor for social isolation  Outcome: Progressing  Note: Patient reports her anxiety is 1/10 and depression is 3/10. She appeared to be mostly brightened while amongst peers in the dayroom. She was social watching movies and playing games. Patient was pleasant and medication compliant. She ate snack     Problem: Sleep Disturbance  Goal: Will exhibit normal sleeping pattern  Description: INTERVENTIONS:  1. Administer medication as ordered  2. Decrease environmental stimuli, including noise, as appropriate  3. Discourage social isolation and naps during the day  Outcome: Progressing     
medication compliance    SHORT-TERM GOALS UPDATE:   Time frame for Short-Term Goals: 5-7 days    LONG-TERM GOALS UPDATE:   Time frame for Long-Term Goals: 6 months  Members Present in Team Meeting: See Signature Sheet    Eugenia Rojas RN

## 2024-07-30 NOTE — PROGRESS NOTES
CLINICAL PHARMACY NOTE: MEDS TO BEDS    Total # of Prescriptions Filled: 3   The following medications were delivered to the patient:  Buspirone 15mg  Propranolol 20mg  Quetiapine 25mg    Additional Documentation: 7/30/24 kbg delivered to [facility] 11:00am    -Melatonin OTC not covered

## 2024-07-30 NOTE — BH NOTE
Patient given tobacco quitline number [id number] at this time, refusing to call at this time and states they are not interested in quitting.  Will consider options in the future.  Patient given information as to the dangers of long term tobacco use. Continue to reinforce the importance of tobacco cessation.

## 2024-07-30 NOTE — GROUP NOTE
Group Therapy Note    Date: 7/30/2024    Group Start Time: 1015  Group End Time: 1045  Group Topic: Psychoeducation    BO BHMichele Middleton        Group Therapy Note    Attendees: 8/19       Patient's Goal:  PT will demonstrate increased interpersonal interaction and participate in group activities of discussing ways to build happiness.     Notes:  Patient is making progress, AEB participating in group discussion, actively listening, and supporting other group members    Status After Intervention:  Improved    Participation Level: Active Listener and Interactive    Participation Quality: Appropriate, Attentive, and Sharing      Speech:  normal      Thought Process/Content: Logical      Affective Functioning: Flat      Mood: euthymic      Level of consciousness:  Alert, Oriented x4, and Attentive      Response to Learning: Able to verbalize/acknowledge new learning and Progressing to goal      Endings: None Reported    Modes of Intervention: Education, Support, and Socialization      Discipline Responsible: /Counselor      Signature:  Michele Wright

## 2024-07-30 NOTE — BH NOTE
[facility] [place]  Discharge Note    Pt discharged with followings belongings:   Dental Appliances: None  Vision - Corrective Lenses: Eyeglasses  Hearing Aid: None  Jewelry: None  Body Piercings Removed: N/A  Clothing: Footwear, Pants, Shirt  Other Valuables: Other (Comment) (N/A)   Valuables sent home with patient. Patient educated on aftercare instructions: Yes  Information faxed to [facility] by RN  at 12:43 PM .Patient verbalize understanding of AVS:  Yes.    Status EXAM upon discharge:  Mental Status and Behavioral Exam  Normal: No  Level of Assistance: Independent/Self  Facial Expression: Brightened, Worried  Affect: Appropriate  Level of Consciousness: Alert  Frequency of Checks: 4 times per hour, close  Mood:Normal: No  Mood: Depressed, Anxious  Motor Activity:Normal: Yes  Eye Contact: Good  Observed Behavior: Cooperative, Friendly, Preoccupied  Sexual Misconduct History: Current - no  Preception: [place] to person, [place] to time, [place] to place, [place] to situation  Attention:Normal: Yes  Attention: Distractible  Thought Processes: Unremarkable  Thought Content:Normal: Yes  Thought Content: Preoccupations  Depression Symptoms: Feelings of helplessness, Feelings of hopelessess  Anxiety Symptoms: Generalized  June Symptoms: No problems reported or observed.  Hallucinations: None  Delusions: No  Memory:Normal: Yes  Memory: Poor recent, Poor remote  Insight and Judgment: No  Insight and Judgment: Poor judgment    Tobacco Screening:  Practical Counseling, on admission, saurabh X, if applicable and completed (first 3 are required if patient doesn't refuse):            ( ) Recognizing danger situations (included triggers and roadblocks)                    ( ) Coping skills (new ways to manage stress,relaxation techniques, changing routine, distraction)                                                           ( ) Basic information about quitting (benefits of quitting, techniques in how to quit, available

## 2024-12-18 RX ORDER — BUSPIRONE HYDROCHLORIDE 10 MG/1
10 TABLET ORAL 2 TIMES DAILY
Qty: 60 TABLET | Refills: 2 | Status: SHIPPED | OUTPATIENT
Start: 2024-12-18

## 2025-01-21 ENCOUNTER — HOSPITAL ENCOUNTER (OUTPATIENT)
Dept: PEDIATRICS | Age: 25
Discharge: HOME | End: 2025-01-21
Payer: COMMERCIAL

## 2025-01-21 ENCOUNTER — HOSPITAL ENCOUNTER (OUTPATIENT)
Age: 25
Discharge: HOME | End: 2025-01-23
Attending: PEDIATRICS
Payer: COMMERCIAL

## 2025-01-21 VITALS
WEIGHT: 293 LBS | HEART RATE: 104 BPM | OXYGEN SATURATION: 97 % | BODY MASS INDEX: 45.99 KG/M2 | DIASTOLIC BLOOD PRESSURE: 69 MMHG | HEIGHT: 67 IN | TEMPERATURE: 97.2 F | RESPIRATION RATE: 16 BRPM | SYSTOLIC BLOOD PRESSURE: 116 MMHG

## 2025-01-21 DIAGNOSIS — Z82.49 FAMILY HISTORY OF HEART DISEASE: Primary | ICD-10-CM

## 2025-01-21 DIAGNOSIS — Z82.49 FAMILY HISTORY OF HEART DISEASE: ICD-10-CM

## 2025-01-21 PROCEDURE — 93325 DOPPLER ECHO COLOR FLOW MAPG: CPT

## 2025-01-21 PROCEDURE — 99214 OFFICE O/P EST MOD 30 MIN: CPT

## 2025-01-21 RX ORDER — ONDANSETRON 4 MG/1
4 TABLET, ORALLY DISINTEGRATING ORAL EVERY 8 HOURS PRN
COMMUNITY

## 2025-01-21 RX ORDER — FAMOTIDINE 20 MG/1
20 TABLET, FILM COATED ORAL 2 TIMES DAILY
COMMUNITY

## 2025-01-21 NOTE — DISCHARGE INSTRUCTIONS
Continue care with OB/GYN.  Call if questions or concerns, Dr. Andrade PH: [phone].  Discharged from [facility].

## 2025-01-27 ENCOUNTER — OFFICE VISIT (OUTPATIENT)
Dept: FAMILY MEDICINE CLINIC | Age: 25
End: 2025-01-27

## 2025-01-27 VITALS
OXYGEN SATURATION: 98 % | BODY MASS INDEX: 45.99 KG/M2 | WEIGHT: 293 LBS | SYSTOLIC BLOOD PRESSURE: 128 MMHG | DIASTOLIC BLOOD PRESSURE: 68 MMHG | HEART RATE: 105 BPM | RESPIRATION RATE: 22 BRPM | HEIGHT: 67 IN

## 2025-01-27 DIAGNOSIS — Z3A.22 22 WEEKS GESTATION OF PREGNANCY: ICD-10-CM

## 2025-01-27 DIAGNOSIS — F33.3 MAJOR DEPRESSIVE DISORDER, RECURRENT, SEVERE WITH PSYCHOTIC SYMPTOMS (HCC): ICD-10-CM

## 2025-01-27 DIAGNOSIS — Z02.1 PHYSICAL EXAM, PRE-EMPLOYMENT: Primary | ICD-10-CM

## 2025-01-27 RX ORDER — PNV NO.95/FERROUS FUM/FOLIC AC 28MG-0.8MG
1 TABLET ORAL DAILY
COMMUNITY
Start: 2024-11-25

## 2025-01-27 SDOH — ECONOMIC STABILITY: INCOME INSECURITY: IN THE LAST 12 MONTHS, WAS THERE A TIME WHEN YOU WERE NOT ABLE TO PAY THE MORTGAGE OR RENT ON TIME?: NO

## 2025-01-27 SDOH — ECONOMIC STABILITY: FOOD INSECURITY: WITHIN THE PAST 12 MONTHS, YOU WORRIED THAT YOUR FOOD WOULD RUN OUT BEFORE YOU GOT MONEY TO BUY MORE.: NEVER TRUE

## 2025-01-27 SDOH — ECONOMIC STABILITY: FOOD INSECURITY: WITHIN THE PAST 12 MONTHS, THE FOOD YOU BOUGHT JUST DIDN'T LAST AND YOU DIDN'T HAVE MONEY TO GET MORE.: NEVER TRUE

## 2025-01-27 SDOH — ECONOMIC STABILITY: TRANSPORTATION INSECURITY
IN THE PAST 12 MONTHS, HAS THE LACK OF TRANSPORTATION KEPT YOU FROM MEDICAL APPOINTMENTS OR FROM GETTING MEDICATIONS?: NO

## 2025-01-27 ASSESSMENT — PATIENT HEALTH QUESTIONNAIRE - PHQ9
3. TROUBLE FALLING OR STAYING ASLEEP: NOT AT ALL
6. FEELING BAD ABOUT YOURSELF - OR THAT YOU ARE A FAILURE OR HAVE LET YOURSELF OR YOUR FAMILY DOWN: NOT AT ALL
9. THOUGHTS THAT YOU WOULD BE BETTER OFF DEAD, OR OF HURTING YOURSELF: NOT AT ALL
1. LITTLE INTEREST OR PLEASURE IN DOING THINGS: NOT AT ALL
4. FEELING TIRED OR HAVING LITTLE ENERGY: SEVERAL DAYS
4. FEELING TIRED OR HAVING LITTLE ENERGY: SEVERAL DAYS
8. MOVING OR SPEAKING SO SLOWLY THAT OTHER PEOPLE COULD HAVE NOTICED. OR THE OPPOSITE, BEING SO FIGETY OR RESTLESS THAT YOU HAVE BEEN MOVING AROUND A LOT MORE THAN USUAL: NOT AT ALL
7. TROUBLE CONCENTRATING ON THINGS, SUCH AS READING THE NEWSPAPER OR WATCHING TELEVISION: NOT AT ALL
SUM OF ALL RESPONSES TO PHQ9 QUESTIONS 1 & 2: 0
SUM OF ALL RESPONSES TO PHQ QUESTIONS 1-9: 1
3. TROUBLE FALLING OR STAYING ASLEEP: NOT AT ALL
10. IF YOU CHECKED OFF ANY PROBLEMS, HOW DIFFICULT HAVE THESE PROBLEMS MADE IT FOR YOU TO DO YOUR WORK, TAKE CARE OF THINGS AT HOME, OR GET ALONG WITH OTHER PEOPLE: NOT DIFFICULT AT ALL
SUM OF ALL RESPONSES TO PHQ QUESTIONS 1-9: 1
6. FEELING BAD ABOUT YOURSELF - OR THAT YOU ARE A FAILURE OR HAVE LET YOURSELF OR YOUR FAMILY DOWN: NOT AT ALL
5. POOR APPETITE OR OVEREATING: NOT AT ALL
SUM OF ALL RESPONSES TO PHQ QUESTIONS 1-9: 1
2. FEELING DOWN, DEPRESSED OR HOPELESS: NOT AT ALL
7. TROUBLE CONCENTRATING ON THINGS, SUCH AS READING THE NEWSPAPER OR WATCHING TELEVISION: NOT AT ALL
2. FEELING DOWN, DEPRESSED OR HOPELESS: NOT AT ALL
10. IF YOU CHECKED OFF ANY PROBLEMS, HOW DIFFICULT HAVE THESE PROBLEMS MADE IT FOR YOU TO DO YOUR WORK, TAKE CARE OF THINGS AT HOME, OR GET ALONG WITH OTHER PEOPLE: NOT DIFFICULT AT ALL
8. MOVING OR SPEAKING SO SLOWLY THAT OTHER PEOPLE COULD HAVE NOTICED. OR THE OPPOSITE - BEING SO FIDGETY OR RESTLESS THAT YOU HAVE BEEN MOVING AROUND A LOT MORE THAN USUAL: NOT AT ALL
SUM OF ALL RESPONSES TO PHQ QUESTIONS 1-9: 1
SUM OF ALL RESPONSES TO PHQ QUESTIONS 1-9: 1
1. LITTLE INTEREST OR PLEASURE IN DOING THINGS: NOT AT ALL
9. THOUGHTS THAT YOU WOULD BE BETTER OFF DEAD, OR OF HURTING YOURSELF: NOT AT ALL
5. POOR APPETITE OR OVEREATING: NOT AT ALL

## 2025-01-27 NOTE — PROGRESS NOTES
Immunization(s) given during visit:    Immunizations Administered       Name Date Dose Route    Influenza, FLUCELVAX, (age 6 mo+) IM, Trivalent PF, 0.5mL 1/27/2025 0.5 mL Intramuscular    Site: Deltoid- Left    Lot: [id number]    NDC: 03516-310-67

## 2025-01-29 RX ORDER — OMEPRAZOLE 40 MG/1
CAPSULE, DELAYED RELEASE ORAL
Qty: 30 CAPSULE | Refills: 5 | Status: SHIPPED | OUTPATIENT
Start: 2025-01-29

## 2025-01-30 ASSESSMENT — ENCOUNTER SYMPTOMS
CHEST TIGHTNESS: 0
NAUSEA: 1
COUGH: 1
VOMITING: 0
SHORTNESS OF BREATH: 0
RHINORRHEA: 0
BACK PAIN: 0
EYES NEGATIVE: 1
DIARRHEA: 0
SORE THROAT: 0
ABDOMINAL PAIN: 0

## 2025-01-30 NOTE — PROGRESS NOTES
[facility] PROFESSIONAL SERVS  [facility]  [address] [id number]  Dept: [phone]  Dept Fax: [phone]  Loc: [phone]  PROGRESS NOTE      VisitDate: 1/27/2025    Laura Moseley is a 24 y.o. female who presents today for:  Chief Complaint   Patient presents with    Annual Exam     Here for work physical     Cough     Cough began yesterday. 22 weeks pregnant.     Flu Vaccine     Needs flu vaccine        Impression/Plan:  1. Physical exam, pre-employment    2. Major depressive disorder, recurrent, severe with psychotic symptoms (HCC)    3. 22 weeks gestation of pregnancy      Requested Prescriptions      No prescriptions requested or ordered in this encounter     Orders Placed This Encounter   Procedures    Influenza, FLUCELVAX Trivalent, (age 6 mo+) IM, Preservative Free, 0.5mL         Subjective:  History of Present Illness  The patient is a 24-year-old female who presents for evaluation of nasal congestion, sore throat, and cough.    She began experiencing symptoms of nasal congestion, sore throat, and cough yesterday. She has not had any fever or body aches.    She is currently 22 weeks pregnant and is employed as a  at [employer]. Her obstetrician has approved the use of these medications during her pregnancy. Her current medication regimen includes BuSpar, Prilosec, Lexapro, and Pepcid.    SOCIAL HISTORY  She is employed as a  at [employer].    MEDICATIONS  BuSpar, Prilosec, Lexapro, Pepcid    IMMUNIZATIONS  She received her MMR vaccine and Tdap vaccine in 2017.       Review of Systems   Constitutional:  Negative for activity change, appetite change, fatigue and fever.   HENT:  Negative for congestion, rhinorrhea and sore throat.    Eyes: Negative.    Respiratory:  Positive for cough. Negative for chest tightness and shortness of breath.    Cardiovascular:  Negative for chest pain and palpitations.   Gastrointestinal:  Positive

## 2025-02-07 RX ORDER — ESCITALOPRAM OXALATE 20 MG/1
20 TABLET ORAL DAILY
Qty: 30 TABLET | Refills: 5 | Status: SHIPPED | OUTPATIENT
Start: 2025-02-07

## 2025-02-13 RX ORDER — BUSPIRONE HYDROCHLORIDE 15 MG/1
TABLET ORAL
Qty: 60 TABLET | Refills: 0 | Status: SHIPPED | OUTPATIENT
Start: 2025-02-13

## 2025-02-13 NOTE — TELEPHONE ENCOUNTER
LOV 01/27/25  Called patient to verify the need for this request and to verify pharmacy. Patient states [facility] is okay to use.

## 2025-03-06 ENCOUNTER — HOSPITAL ENCOUNTER (OUTPATIENT)
Dept: ULTRASOUND IMAGING | Age: 25
Discharge: HOME | End: 2025-03-06
Payer: COMMERCIAL

## 2025-03-06 DIAGNOSIS — O99.213 MATERNAL OBESITY SYNDROME IN THIRD TRIMESTER: ICD-10-CM

## 2025-03-06 PROCEDURE — 76811 OB US DETAILED SNGL FETUS: CPT

## 2025-03-06 PROCEDURE — 99211 OFF/OP EST MAY X REQ PHY/QHP: CPT

## 2025-03-06 NOTE — PROGRESS NOTES
HT: 5' 8\"   WT:  368 Lbs. 167.3  Kg.  BMI- 55.9  T: 97.6  Skin  BP: 134/68  P: 107  R:20  Problems/concerns- none.

## 2025-03-10 RX ORDER — BUSPIRONE HYDROCHLORIDE 15 MG/1
TABLET ORAL
Qty: 180 TABLET | Refills: 1 | Status: SHIPPED | OUTPATIENT
Start: 2025-03-10

## 2025-03-14 NOTE — DISCHARGE INSTRUCTIONS
The parent or patient representative was advised that at this point the patient can be treated safely at home, the parent or Patient representative should be aware of following interventions and  advised to the watch for the following:  #1.  Any increasing pain not controlled with Motrin or Tylenol.    #2.  Any development of drainage from the ears redness of the auricle or posterior ear.  #3.  Her development of the any fever chills, headache or stiffness of the neck the patient needs to be reevaluated by the primary care provider, return here or go to the emergency department for reevaluation.   The patient or patient's representative are agreeable to the outpatient management at this time.  They are advised to follow-up with her primary care provider in 2-3 days for reevaluation.  The patient left ambulatory without any problems.   Per CMS writer.

## 2025-03-19 ENCOUNTER — HOSPITAL ENCOUNTER (OUTPATIENT)
Dept: NURSING | Age: 25
Discharge: HOME | End: 2025-03-19
Payer: COMMERCIAL

## 2025-03-19 VITALS
HEART RATE: 117 BPM | DIASTOLIC BLOOD PRESSURE: 75 MMHG | OXYGEN SATURATION: 97 % | RESPIRATION RATE: 22 BRPM | TEMPERATURE: 96.2 F | SYSTOLIC BLOOD PRESSURE: 144 MMHG

## 2025-03-19 LAB
GA (WEEKS): NORMAL WK
IAT IGG-SP REAG SERPL QL: NORMAL

## 2025-03-19 PROCEDURE — 86885 COOMBS TEST INDIRECT QUAL: CPT

## 2025-03-19 PROCEDURE — 96372 THER/PROPH/DIAG INJ SC/IM: CPT

## 2025-03-19 PROCEDURE — 6360000002 HC RX W HCPCS: Performed by: OBSTETRICS & GYNECOLOGY

## 2025-03-19 RX ADMIN — HUMAN RHO(D) IMMUNE GLOBULIN 300 MCG: 300 INJECTION, SOLUTION INTRAMUSCULAR at 16:07

## 2025-03-19 NOTE — PROGRESS NOTES
1607: Injection administered- patient tolerated well. AVS reviewed with patient, voiced understanding. Patient discharged ambulatory.

## 2025-03-19 NOTE — PROGRESS NOTES
1500 Patient ambulatory to OPN for Rhogam injection. Patient confirms 28 weeks pregnant. Patient verbalizes understanding of injection. PT RIGHTS AND RESPONSIBILITIES OFFERED TO PT.          _M___ Safety:       (Environmental)  [place] to environment  Ensure ID band is correct and in place/ allergy band as needed  Assess for fall risk  Initiate fall precautions as applicable (fall band, side rails, etc.)  Call light within reach  Bed in low position/ wheels locked    _M___ Pain:       Assess pain level and characteristics  Administer analgesics as ordered  Assess effectiveness of pain management and report to MD as needed    _M___ Knowledge Deficit:  Assess baseline knowledge  Provide teaching at level of understanding  Provide teaching via preferred learning method  Evaluate teaching effectiveness    _M___ Hemodynamic/Respiratory Status:       (Pre and Post Procedure Monitoring)  Assess/Monitor vital signs and LOC  Assess Baseline SpO2 prior to any sedation  Obtain weight/height  Assess vital signs/ LOC until patient meets discharge criteria  Monitor procedure site and notify MD of any issues

## 2025-04-12 ENCOUNTER — HOSPITAL ENCOUNTER (EMERGENCY)
Age: 25
Discharge: HOME | End: 2025-04-12
Payer: COMMERCIAL

## 2025-04-12 VITALS
BODY MASS INDEX: 44.41 KG/M2 | SYSTOLIC BLOOD PRESSURE: 117 MMHG | TEMPERATURE: 96.4 F | DIASTOLIC BLOOD PRESSURE: 56 MMHG | RESPIRATION RATE: 20 BRPM | HEART RATE: 106 BPM | WEIGHT: 293 LBS | OXYGEN SATURATION: 96 % | HEIGHT: 68 IN

## 2025-04-12 DIAGNOSIS — S39.91XA ABDOMINAL TRAUMA, INITIAL ENCOUNTER: ICD-10-CM

## 2025-04-12 DIAGNOSIS — Z67.91 RH NEGATIVE STATUS DURING PREGNANCY IN THIRD TRIMESTER: ICD-10-CM

## 2025-04-12 DIAGNOSIS — O9A.213 TRAUMATIC INJURY DURING PREGNANCY, THIRD TRIMESTER: Primary | ICD-10-CM

## 2025-04-12 DIAGNOSIS — O26.893 RH NEGATIVE STATUS DURING PREGNANCY IN THIRD TRIMESTER: ICD-10-CM

## 2025-04-12 DIAGNOSIS — Z3A.32 32 WEEKS GESTATION OF PREGNANCY: ICD-10-CM

## 2025-04-12 LAB
APTT PPP: 26.4 SECONDS (ref 22–38)
DEPRECATED RDW RBC AUTO: 47.1 FL (ref 35–45)
ERYTHROCYTE [DISTWIDTH] IN BLOOD BY AUTOMATED COUNT: 15.7 % (ref 11.5–14.5)
FETAL CELL SCN BLD QL ROSETTE: NORMAL
FIBRINOGEN PPP-MCNC: 453 MG/100ML (ref 155–475)
GA (WEEKS): 32.5 WK
HCT VFR BLD AUTO: 32.4 % (ref 37–47)
HGB BLD-MCNC: 10.2 GM/DL (ref 12–16)
INDIRECT COOMBS: NORMAL
INR PPP: 0.9 (ref 0.85–1.13)
MCH RBC QN AUTO: 26.1 PG (ref 26–33)
MCHC RBC AUTO-ENTMCNC: 31.5 GM/DL (ref 32.2–35.5)
MCV RBC AUTO: 82.9 FL (ref 81–99)
PLATELET # BLD AUTO: 216 THOU/MM3 (ref 130–400)
PMV BLD AUTO: 9.5 FL (ref 9.4–12.4)
RBC # BLD AUTO: 3.91 MILL/MM3 (ref 4.2–5.4)
WBC # BLD AUTO: 10.4 THOU/MM3 (ref 4.8–10.8)

## 2025-04-12 PROCEDURE — 36415 COLL VENOUS BLD VENIPUNCTURE: CPT

## 2025-04-12 PROCEDURE — 85730 THROMBOPLASTIN TIME PARTIAL: CPT

## 2025-04-12 PROCEDURE — 85384 FIBRINOGEN ACTIVITY: CPT

## 2025-04-12 PROCEDURE — 85610 PROTHROMBIN TIME: CPT

## 2025-04-12 PROCEDURE — 86901 BLOOD TYPING SEROLOGIC RH(D): CPT

## 2025-04-12 PROCEDURE — 86885 COOMBS TEST INDIRECT QUAL: CPT

## 2025-04-12 PROCEDURE — 6360000002 HC RX W HCPCS: Performed by: STUDENT IN AN ORGANIZED HEALTH CARE EDUCATION/TRAINING PROGRAM

## 2025-04-12 PROCEDURE — 85461 HEMOGLOBIN FETAL: CPT

## 2025-04-12 PROCEDURE — 86900 BLOOD TYPING SEROLOGIC ABO: CPT

## 2025-04-12 PROCEDURE — 85027 COMPLETE CBC AUTOMATED: CPT

## 2025-04-12 PROCEDURE — 96372 THER/PROPH/DIAG INJ SC/IM: CPT

## 2025-04-12 RX ADMIN — HUMAN RHO(D) IMMUNE GLOBULIN 300 MCG: 300 INJECTION, SOLUTION INTRAMUSCULAR at 14:06

## 2025-04-12 NOTE — ED TRIAGE NOTES
32+3/7 wks Dr Stapleton here with significant other with complaints that around 10:15am she was leaving a room and tripped over dresser drawers she had on the floor. Pt states she hit her stomach on the ground. Pt hit her right lower leg/shin area on the dresser drawer. States immediately she had a quick sharp pain to abdomen x1. States  had not felt baby move til she arrived to hospital.Feeling good fetal movement now. Denies vaginal bleeding,leaking of fluid. States she has felt cramping for past week or so and states that is unchanged. Oriented to room and plan of care. Changing into gown.   
1120 Dr Oconnor at bedside obtaining history from pt and discussing plan of care. Will observe until 2:15pm .  1124 Ultrasound applied per Dr Oconnor.  1125 Ultrasound removed per MD. Orders received for lab work and rhogam and continue to monitor for ctxs and fhts . MD aware difficult to monitor fhts due to pt large abd girth and gestational age.   
1130 Up to BR. Urine specimen cup given for clean catch.  1134 Returned back to bed. Pt states she had a bowel movement but did not void.   
1419 Update sent to Dr Anastasia bhandariam given and labs resulted.   1423 Dr Oconnor at nurses station viewing fetal monitor strip.MD states she viewed labs. Orders received for discharge.    
Dr Oconnor in room and updated RN cont to adj US belt. Continue to have difficulties keeping baby on monitor.  
Dr Oconnor paged for new patient. Dr Oconnor called unit and updated  32+3/7 pt Dr Elizondo here with complaints of tripping over dresser drawer on floor and hitting her stomach on ground. +fetal movement once patient arrived to hospital. Pt c/o cramping,but unchanged from past week. Denies vaginal bleeding. Pt 350 pounds and difficult to record fhts. RN continues to adj monitor. MD states she will be in to see pt.   
Lab in room to draw blood work. Water pitcher given. Denies needs.   
Rhogam injection given.   
Yes

## 2025-04-12 NOTE — ED PROVIDER NOTES
Department of Obstetrics and Gynecology  Labor and Delivery   Triage Note - [facility] Attending      Pt Name: Laura Moseley  MRN: [id number] Acct #: [id number]  YOB: 2000  Procedure Performed By: Naomi Oconnor MD    SUBJECTIVE:     Patient is a 24 year old  at 32w3d,  patient of Dr. Stapleton, came in complaining of fall onto her stomach at 1015 this morning. Reports running into a piece of furniture and losing her balance, causing her to fall directly onto her stomach. Denies any dizziness or presyncope contributing to this event. Reports intermittent abdominal cramping present over the last week, unchanged following her fall. Denies vaginal bleeding, LOF, DFM.    OBJECTIVE:  Vitals: BP (!) 117/56   Pulse (!) 106   Temp (!) 96.4 °F (35.8 °C) (Temporal)   Resp 20   Ht 1.727 m (5' 8\")   Wt (!) 158.8 kg (350 lb)   SpO2 96%   BMI 53.22 kg/m²   General appearance: No apparent distress, appears stated age.  HENT: Head normal in appearance.   Respiratory:  Normal respiratory effort.  Abdomen: Soft, non-tender, non-distended, morbidly obese.  Psychiatric: Alert and oriented, normal insight and thought content.   Ext: minimal edema  Cervix:    TAUS:  Cephalic, SDP 3.47cm, anterior placenta without evidence of separation from uterine wall  Membranes:  Intact  Fetal monitoring: reactive/reassuring extended monitoring until 1415 (4 hours after fall)  Contraction frequency: 0 minutes    Labs Reviewed   CBC - Abnormal; Notable for the following components:       Result Value    RBC 3.91 (*)     Hemoglobin 10.2 (*)     Hematocrit 32.4 (*)     MCHC 31.5 (*)     RDW-CV 15.7 (*)     RDW-SD 47.1 (*)     All other components within normal limits   APTT   PROTIME-INR   FIBRINOGEN   GESTATIONAL AGE IN WEEKS   RHOGAM LAB NOTIFICATION    Narrative:     [id number]           issued   ABO/RH    Narrative:     Performed at [facility] [address] [place] [address]  A  NEG   ANTIBODY SCREEN   FETAL SCREEN        ASSESSMENT & PLAN:    25yo  at 32w3d presented to [facility] triage for evaluation after fall from standing height onto her abdomen at 1015 this morning. Fetal monitoring until 1415 (4 hours after fall) reactive/reassuring. TAUS reasurring with appropriate SDP. CBC demonstrated mild anemia (pt on iron supplement). PT/INR, PTT, fibrinogen unremarkable. Patient Rh negative. T&S performed and Rhogam given. No clinical evidence of placental abruption; however, discussed possibility of occult abruption following abdominal trauma.    - Discussed low threshold for returning to [facility] for abdominal pain, vaginal bleeding, or other acute concerns, particularly over the next 24 hours, as this is the highest risk timeframe for a placental abruption following a traumatic event    Naomi Oconnor MD, FACOG  OB/Gyn Physician  [facility]      Naomi Oconnor MD 2025 2:26 PM

## 2025-04-12 NOTE — DISCHARGE INSTRUCTIONS
Home Undelivered Discharge Instructions    After Discharge Orders:           Diet: regular diet   Increase fluid intake to 8-10 glasses of water daily    Rest: normal activity as tolerated    Other instructions: Do kick counts once a day on your baby. Choose the time of day your baby is most active. Get in a comfortable lying or sitting position and time how long it takes to feel 10 kicks, twists, turns, swishes, or rolls.     Call Your Doctor if Any of the Following Occurs   Leaking fluid from vagina with or without contractions  Bright red vaginal bleeding occurs that is as heavy as or heavier than a period  Regular contractions are longer, stronger, and closer together  Noticeable decreased fetal movement  Elevated temperature >100.5°F and chills  Blurred vision, spots before eyes, unrelievable headache, severe facial swelling, or upper abdominal pain  Contact physician or hospital OB unit if signs of premature labor occur at <=36 weeks  Persistent or rhythmic low back pain that feels different than you are used to  Menstrual like cramps  Intestinal cramps with or without diarrhea  Pelvic pressure or rhythmic tightening that feels different than you are used to  Watery discharge or a gush of fluid from your vagina  Vaginal bleeding as heavy as a period  General Information     Difference between:  False Labor True Labor   1. Contractions often are irregular and don't consistently get closer together (called [place]-Duff contractions) 1. Contractions come at regular intervals and, as time goes on, get closer together.   2. Contractions may often stop when you rest or with a position change. 2. Contractions continue despite movement or walking. Contractions get stronger and closer together with time.   3. Often felt in the lower abdomen. 3. Usually felt in back coming around to the front.     Reminder to Patient   Please bring all teaching sheets and discharge information with you if you return to the hospital or  General 150

## 2025-04-19 ENCOUNTER — HOSPITAL ENCOUNTER (OUTPATIENT)
Age: 25
Discharge: HOME | End: 2025-04-19
Attending: OBSTETRICS & GYNECOLOGY | Admitting: OBSTETRICS & GYNECOLOGY
Payer: COMMERCIAL

## 2025-04-19 VITALS
RESPIRATION RATE: 18 BRPM | SYSTOLIC BLOOD PRESSURE: 130 MMHG | TEMPERATURE: 96.4 F | HEART RATE: 96 BPM | DIASTOLIC BLOOD PRESSURE: 63 MMHG | OXYGEN SATURATION: 99 %

## 2025-04-19 DIAGNOSIS — O47.03 FALSE LABOR BEFORE 37 COMPLETED WEEKS OF GESTATION IN THIRD TRIMESTER: Primary | ICD-10-CM

## 2025-04-19 PROBLEM — O26.899 CRAMPING AFFECTING PREGNANCY, ANTEPARTUM: Status: ACTIVE | Noted: 2025-04-19

## 2025-04-19 PROBLEM — R10.9 CRAMPING AFFECTING PREGNANCY, ANTEPARTUM: Status: ACTIVE | Noted: 2025-04-19

## 2025-04-19 LAB
AMORPH SED URNS QL MICRO: ABNORMAL
BACTERIA URNS QL MICRO: ABNORMAL /HPF
BILIRUB UR QL STRIP.AUTO: NEGATIVE
CASTS #/AREA URNS LPF: ABNORMAL /LPF
CASTS 2: ABNORMAL /LPF
CHARACTER UR: ABNORMAL
COLOR, UA: YELLOW
CRYSTALS URNS MICRO: ABNORMAL
CRYSTALS URNS MICRO: ABNORMAL
EPITHELIAL CELLS, UA: ABNORMAL /HPF
GLUCOSE UR QL STRIP.AUTO: NEGATIVE MG/DL
HGB UR QL STRIP.AUTO: NEGATIVE
KETONES UR QL STRIP.AUTO: NEGATIVE
MISCELLANEOUS 2: ABNORMAL
MISCELLANEOUS LAB TEST RESULT: ABNORMAL
MUCOUS THREADS URNS QL MICRO: ABNORMAL
NITRITE UR QL STRIP: NEGATIVE
PH UR STRIP.AUTO: 6 [PH] (ref 5–9)
PROT UR STRIP.AUTO-MCNC: NEGATIVE MG/DL
RBC URINE: ABNORMAL /HPF
RENAL EPI CELLS #/AREA URNS HPF: ABNORMAL /[HPF]
SP GR UR REFRACT.AUTO: 1.03 (ref 1–1.03)
UROBILINOGEN, URINE: 0.2 EU/DL (ref 0–1)
WBC #/AREA URNS HPF: ABNORMAL /HPF
WBC #/AREA URNS HPF: ABNORMAL /[HPF]
YEAST LIKE FUNGI URNS QL MICRO: ABNORMAL

## 2025-04-19 PROCEDURE — 6370000000 HC RX 637 (ALT 250 FOR IP): Performed by: OBSTETRICS & GYNECOLOGY

## 2025-04-19 PROCEDURE — 87086 URINE CULTURE/COLONY COUNT: CPT

## 2025-04-19 PROCEDURE — 81001 URINALYSIS AUTO W/SCOPE: CPT

## 2025-04-19 RX ORDER — ACETAMINOPHEN 500 MG
1000 TABLET ORAL ONCE
Status: COMPLETED | OUTPATIENT
Start: 2025-04-19 | End: 2025-04-19

## 2025-04-19 RX ADMIN — ACETAMINOPHEN 500 MG TABLET 1000 MG: at 23:37

## 2025-04-19 ASSESSMENT — PAIN SCALES - GENERAL: PAINLEVEL_OUTOF10: 6

## 2025-04-19 ASSESSMENT — PAIN DESCRIPTION - DESCRIPTORS: DESCRIPTORS: CRAMPING;PRESSURE

## 2025-04-19 ASSESSMENT — PAIN DESCRIPTION - LOCATION: LOCATION: VAGINA

## 2025-04-20 NOTE — ED TRIAGE NOTES
Dr. Pineda at bedside. Patient now also admits to blurred vision today for short periods. MD states will perform SVE but would like FHT tracing longer first. MD instructed patient to use clicker when she feels contraction pain, press once at the beginning of contraction and once at the end of contraction. Patient states understanding.

## 2025-04-20 NOTE — ED PROVIDER NOTES
Department of Obstetrics and Gynecology  Labor and Delivery  OB EMERGENCY ROOM    [facility] Attending      Pt Name: Laura Moseley  MRN: [id number] Acct #: [id number]  YOB: 2000  Procedure Performed By: Alfie Pineda MD       CC: vaginal pain and contractions      SUBJECTIVE  Patient is a 25 y/o  year old  at 33+3 weeks,  patient of Dr. Stapleton, presents with complaints of  vaginal pain for the past 2 days, and contractions that started about an hour ago that she rates 6/10, every few minutes    Patient denies any decreased fetal movement, vaginal bleeding, or leaking of fluid.    Denies any headaches, visual symptoms or epigastric pain.   Denies fever, chills, coughing, wheezing, chest pain, shortness of breath, nausea, vomiting, diarrhea, constipation, or dysuria.    OB History    OB History    Para Term  AB Living   2    1 0   SAB IAB Ectopic Molar Multiple Live Births   1           # Outcome Date GA Lbr Isidro/2nd Weight Sex Type Anes PTL Lv   2 Current            1 SAB                PMH:   Past Medical History:   Diagnosis Date    Anemia     taking iron this pregnancy    Anxiety     Depression     Eczema     Enlarged tonsils     GERD (gastroesophageal reflux disease)        PSH:   Past Surgical History:   Procedure Laterality Date    COLONOSCOPY      2024    TONSILLECTOMY AND ADENOIDECTOMY N/A 2023    TONSILLECTOMY performed by Arvind العلي MD at [facility] OR    UPPER GASTROINTESTINAL ENDOSCOPY  2024    WISDOM TOOTH EXTRACTION         ROS - negative except those noted in HPI    Allergies: Hydroxyzine hcl and Adhesive tape    Family History:       Problem Relation Age of Onset    Diabetes Mother     Depression Father     Allergy (Severe) Father     Colon Cancer Father         stage 3    Colon Cancer Paternal Aunt     Cancer Paternal Aunt         Ovarian    Cancer Paternal Uncle         skin cancer    Depression Paternal Uncle     Asthma Maternal Grandmother     COPD

## 2025-04-20 NOTE — ED TRIAGE NOTES
Patient agreeable to discharge. Monitors removed and patient changed into street clothes. Discharge instructions reviewed per AVS. Patient states understanding and has no further questions or concerns at this time. Discharge papers given to patient. Patient declined wheelchair transport, ambulated off unit in stable condition with FOB and discharge papers in hand.

## 2025-04-20 NOTE — DISCHARGE INSTRUCTIONS
Home Undelivered Discharge Instructions    After Discharge Orders:           Diet: regular diet   Increase fluid intake to 8-10 glasses of water daily    Rest: normal activity as tolerated    Other instructions: Do kick counts once a day on your baby. Choose the time of day your baby is most active. Get in a comfortable lying or sitting position and time how long it takes to feel 10 kicks, twists, turns, swishes, or rolls.     Call Your Doctor if Any of the Following Occurs   Leaking fluid from vagina with or without contractions  Bright red vaginal bleeding occurs that is as heavy as or heavier than a period  Regular contractions are longer, stronger, and closer together  Noticeable decreased fetal movement  Elevated temperature >100.5°F and chills  Blurred vision, spots before eyes, unrelievable headache, severe facial swelling, or upper abdominal pain  Contact physician or hospital OB unit if signs of premature labor occur at <=36 weeks  Persistent or rhythmic low back pain that feels different than you are used to  Menstrual like cramps  Intestinal cramps with or without diarrhea  Pelvic pressure or rhythmic tightening that feels different than you are used to  Watery discharge or a gush of fluid from your vagina  Vaginal bleeding as heavy as a period  General Information     Difference between:  False Labor True Labor   1. Contractions often are irregular and don't consistently get closer together (called [place]-Duff contractions) 1. Contractions come at regular intervals and, as time goes on, get closer together.   2. Contractions may often stop when you rest or with a position change. 2. Contractions continue despite movement or walking. Contractions get stronger and closer together with time.   3. Often felt in the lower abdomen. 3. Usually felt in back coming around to the front.     Reminder to Patient   Please bring all teaching sheets and discharge information with you if you return to the hospital or

## 2025-04-20 NOTE — FLOWSHEET NOTE
Notified Dr. Miriam AMBROSE closed, posterior and soft. FHTs reactive. No contractions noted at this time, some irritability. MD states may remove US transducer at this time but continue toco. Collect UA per MD.

## 2025-04-21 LAB
BACTERIA UR CULT: ABNORMAL
ORGANISM: ABNORMAL

## 2025-05-18 ENCOUNTER — HOSPITAL ENCOUNTER (OUTPATIENT)
Age: 25
Discharge: HOME | End: 2025-05-18
Attending: OBSTETRICS & GYNECOLOGY | Admitting: OBSTETRICS & GYNECOLOGY
Payer: COMMERCIAL

## 2025-05-18 VITALS
WEIGHT: 293 LBS | HEIGHT: 68 IN | SYSTOLIC BLOOD PRESSURE: 119 MMHG | OXYGEN SATURATION: 99 % | BODY MASS INDEX: 44.41 KG/M2 | DIASTOLIC BLOOD PRESSURE: 84 MMHG | TEMPERATURE: 97.3 F | RESPIRATION RATE: 18 BRPM | HEART RATE: 105 BPM

## 2025-05-18 PROBLEM — Z3A.39 39 WEEKS GESTATION OF PREGNANCY: Status: ACTIVE | Noted: 2025-05-18

## 2025-05-18 PROCEDURE — 99284 EMERGENCY DEPT VISIT MOD MDM: CPT

## 2025-05-18 NOTE — ED TRIAGE NOTES
2 para 0 with EDC 2025 37 4/7 weeks gestation and says feels dizzy and feels like blood pressure may be low and then has abdominal cramps.  Ron Leary-Alcazar at side.

## 2025-05-18 NOTE — DISCHARGE INSTRUCTIONS
Home Undelivered Discharge Instructions    After Discharge Orders:           Diet: regular diet   Increase fluid intake to 8-10 glasses of water daily    Rest: normal activity as tolerated    Other instructions: Do kick counts once a day on your baby. Choose the time of day your baby is most active. Get in a comfortable lying or sitting position and time how long it takes to feel 10 kicks, twists, turns, swishes, or rolls.     Call Your Doctor if Any of the Following Occurs   Leaking fluid from vagina with or without contractions  Bright red vaginal bleeding occurs that is as heavy as or heavier than a period  Regular contractions are longer, stronger, and closer together  Noticeable decreased fetal movement  Elevated temperature >100.5°F and chills  Blurred vision, spots before eyes, unrelievable headache, severe facial swelling, or upper abdominal pain  Contact physician or hospital OB unit if signs of premature labor occur at <=36 weeks  Persistent or rhythmic low back pain that feels different than you are used to  Menstrual like cramps  Intestinal cramps with or without diarrhea  Pelvic pressure or rhythmic tightening that feels different than you are used to  Watery discharge or a gush of fluid from your vagina  Vaginal bleeding as heavy as a period  General Information     Difference between:  False Labor True Labor   1. Contractions often are irregular and don't consistently get closer together (called [place]-Duff contractions) 1. Contractions come at regular intervals and, as time goes on, get closer together.   2. Contractions may often stop when you rest or with a position change. 2. Contractions continue despite movement or walking. Contractions get stronger and closer together with time.   3. Often felt in the lower abdomen. 3. Usually felt in back coming around to the front.     Reminder to Patient   Please bring all teaching sheets and discharge information with you if you return to the hospital or

## 2025-05-18 NOTE — ED TRIAGE NOTES
Discharge instructions given and offered to ask for meds for nausea and says will just take Zofran at home and ice water given. Ron at side. Says is comfortable going home.

## 2025-05-18 NOTE — PROGRESS NOTES
Department of Obstetrics and Gynecology  Labor and Delivery  Attending Triage Note      SUBJECTIVE: 34-year-old -0-1-0 at 37 weeks and 4 days presents to triage because of feeling dizzy and feeling that her blood pressure might be low.    OBJECTIVE    Vitals:  /84   Pulse (!) 105   Temp 97.3 °F (36.3 °C) (Temporal)   SpO2 99%     CONSTITUTIONAL:  awake, alert, cooperative, no apparent distress, and appears stated age  Fetal Position:  Cephalic    Membranes:  Intact    Fetal heart rate:         Baseline Heart Rate:  130       Accelerations:  present       Decelerations:  neg       Variability:  moderate  Reactive NST  Contraction frequency: 0 minutes  ASSESSMENT & PLAN:      Principal Problem:    39 weeks gestation of pregnancy  Plan: Afebrile stable vital signs.  Reactive NST  Will DC the patient labor precautions preeclampsia and fetal kick count precautions

## 2025-05-18 NOTE — ED TRIAGE NOTES
Fetal monitor ultrasound transducer applied to abdomen by HERMINIO Sanders RN and the fetal heart tones recording at 120-130.  Feeling fetal movement and the denies leaking of amniotic membranes.

## 2025-05-19 ENCOUNTER — HOSPITAL ENCOUNTER (INPATIENT)
Age: 25
LOS: 3 days | Discharge: HOME | End: 2025-05-22
Attending: OBSTETRICS & GYNECOLOGY | Admitting: OBSTETRICS & GYNECOLOGY
Payer: COMMERCIAL

## 2025-05-19 PROBLEM — Z34.90 ENCOUNTER FOR INDUCTION OF LABOR: Status: ACTIVE | Noted: 2025-05-19

## 2025-05-19 LAB
ABO GROUP BLD: NORMAL
ALBUMIN SERPL BCG-MCNC: 3.4 G/DL (ref 3.4–4.9)
ALP SERPL-CCNC: 169 U/L (ref 38–126)
ALT SERPL W/O P-5'-P-CCNC: 8 U/L (ref 10–35)
AMPHETAMINES UR QL SCN: NEGATIVE
ANION GAP SERPL CALC-SCNC: 14 MEQ/L (ref 8–16)
AST SERPL-CCNC: 20 U/L (ref 10–35)
BARBITURATES UR QL SCN: NEGATIVE
BENZODIAZ UR QL SCN: NEGATIVE
BILIRUB SERPL-MCNC: < 0.2 MG/DL (ref 0.3–1.2)
BUN SERPL-MCNC: 7 MG/DL (ref 8–23)
BZE UR QL SCN: NEGATIVE
CALCIUM SERPL-MCNC: 8.8 MG/DL (ref 8.6–10)
CANNABINOIDS UR QL SCN: NEGATIVE
CHLORIDE SERPL-SCNC: 104 MEQ/L (ref 98–111)
CO2 SERPL-SCNC: 19 MEQ/L (ref 22–29)
CREAT SERPL-MCNC: 0.5 MG/DL (ref 0.5–0.9)
CREAT UR-MCNC: 241 MG/DL
DEPRECATED RDW RBC AUTO: 47.6 FL (ref 35–45)
ERYTHROCYTE [DISTWIDTH] IN BLOOD BY AUTOMATED COUNT: 15.7 % (ref 11.5–14.5)
FENTANYL: NEGATIVE
GFR SERPL CREATININE-BSD FRML MDRD: > 90 ML/MIN/1.73M2
GLUCOSE SERPL-MCNC: 69 MG/DL (ref 74–109)
HCT VFR BLD AUTO: 34.3 % (ref 37–47)
HGB BLD-MCNC: 10.7 GM/DL (ref 12–16)
IAT IGG-SP REAG SERPL QL: NORMAL
INDIRECT COOMBS: NORMAL
MCH RBC QN AUTO: 25.9 PG (ref 26–33)
MCHC RBC AUTO-ENTMCNC: 31.2 GM/DL (ref 32.2–35.5)
MCV RBC AUTO: 83.1 FL (ref 81–99)
OPIATES UR QL SCN: NEGATIVE
OXYCODONE: NEGATIVE
PCP UR QL SCN: NEGATIVE
PLATELET # BLD AUTO: 250 THOU/MM3 (ref 130–400)
PMV BLD AUTO: 10 FL (ref 9.4–12.4)
POTASSIUM SERPL-SCNC: 3.9 MEQ/L (ref 3.5–5.2)
PROT SERPL-MCNC: 6.6 G/DL (ref 6.4–8.3)
PROT UR-MCNC: 27.2 MG/DL
PROT/CREAT 24H UR: 0.11 MG/G{CREAT}
RBC # BLD AUTO: 4.13 MILL/MM3 (ref 4.2–5.4)
RH BLD: NORMAL
SODIUM SERPL-SCNC: 137 MEQ/L (ref 135–145)
WBC # BLD AUTO: 11 THOU/MM3 (ref 4.8–10.8)

## 2025-05-19 PROCEDURE — 1220000001 HC SEMI PRIVATE L&D R&B

## 2025-05-19 PROCEDURE — 86885 COOMBS TEST INDIRECT QUAL: CPT

## 2025-05-19 PROCEDURE — 80053 COMPREHEN METABOLIC PANEL: CPT

## 2025-05-19 PROCEDURE — 86592 SYPHILIS TEST NON-TREP QUAL: CPT

## 2025-05-19 PROCEDURE — 80307 DRUG TEST PRSMV CHEM ANLYZR: CPT

## 2025-05-19 PROCEDURE — 86901 BLOOD TYPING SEROLOGIC RH(D): CPT

## 2025-05-19 PROCEDURE — 2580000003 HC RX 258: Performed by: OBSTETRICS & GYNECOLOGY

## 2025-05-19 PROCEDURE — 6360000002 HC RX W HCPCS: Performed by: OBSTETRICS & GYNECOLOGY

## 2025-05-19 PROCEDURE — 82570 ASSAY OF URINE CREATININE: CPT

## 2025-05-19 PROCEDURE — 36415 COLL VENOUS BLD VENIPUNCTURE: CPT

## 2025-05-19 PROCEDURE — 85027 COMPLETE CBC AUTOMATED: CPT

## 2025-05-19 PROCEDURE — 86900 BLOOD TYPING SEROLOGIC ABO: CPT

## 2025-05-19 PROCEDURE — 10907ZC DRAINAGE OF AMNIOTIC FLUID, THERAPEUTIC FROM PRODUCTS OF CONCEPTION, VIA NATURAL OR ARTIFICIAL OPENING: ICD-10-PCS | Performed by: OBSTETRICS & GYNECOLOGY

## 2025-05-19 PROCEDURE — 84156 ASSAY OF PROTEIN URINE: CPT

## 2025-05-19 RX ORDER — ACETAMINOPHEN 325 MG/1
650 TABLET ORAL EVERY 4 HOURS PRN
Status: DISCONTINUED | OUTPATIENT
Start: 2025-05-19 | End: 2025-05-20 | Stop reason: HOSPADM

## 2025-05-19 RX ORDER — OXYTOCIN/0.9 % SODIUM CHLORIDE 30/500 ML
87.3 PLASTIC BAG, INJECTION (ML) INTRAVENOUS PRN
Status: COMPLETED | OUTPATIENT
Start: 2025-05-19 | End: 2025-05-20

## 2025-05-19 RX ORDER — TRANEXAMIC ACID 10 MG/ML
1000 INJECTION, SOLUTION INTRAVENOUS
Status: DISCONTINUED | OUTPATIENT
Start: 2025-05-19 | End: 2025-05-20 | Stop reason: HOSPADM

## 2025-05-19 RX ORDER — DIPHENHYDRAMINE HYDROCHLORIDE 50 MG/ML
25 INJECTION, SOLUTION INTRAMUSCULAR; INTRAVENOUS EVERY 4 HOURS PRN
Status: DISCONTINUED | OUTPATIENT
Start: 2025-05-19 | End: 2025-05-20 | Stop reason: HOSPADM

## 2025-05-19 RX ORDER — TERBUTALINE SULFATE 1 MG/ML
0.25 INJECTION SUBCUTANEOUS
Status: DISCONTINUED | OUTPATIENT
Start: 2025-05-19 | End: 2025-05-20 | Stop reason: HOSPADM

## 2025-05-19 RX ORDER — OXYTOCIN/0.9 % SODIUM CHLORIDE 30/500 ML
1-20 PLASTIC BAG, INJECTION (ML) INTRAVENOUS CONTINUOUS
Status: DISCONTINUED | OUTPATIENT
Start: 2025-05-19 | End: 2025-05-20

## 2025-05-19 RX ORDER — METHYLERGONOVINE MALEATE 0.2 MG/ML
200 INJECTION INTRAVENOUS PRN
Status: DISCONTINUED | OUTPATIENT
Start: 2025-05-19 | End: 2025-05-20 | Stop reason: HOSPADM

## 2025-05-19 RX ORDER — SEVOFLURANE 250 ML/250ML
1 LIQUID RESPIRATORY (INHALATION) CONTINUOUS PRN
Status: DISCONTINUED | OUTPATIENT
Start: 2025-05-19 | End: 2025-05-20 | Stop reason: HOSPADM

## 2025-05-19 RX ORDER — DOCUSATE SODIUM 100 MG/1
100 CAPSULE, LIQUID FILLED ORAL 2 TIMES DAILY PRN
Status: DISCONTINUED | OUTPATIENT
Start: 2025-05-19 | End: 2025-05-20 | Stop reason: HOSPADM

## 2025-05-19 RX ORDER — ONDANSETRON 2 MG/ML
4 INJECTION INTRAMUSCULAR; INTRAVENOUS EVERY 6 HOURS PRN
Status: DISCONTINUED | OUTPATIENT
Start: 2025-05-19 | End: 2025-05-20 | Stop reason: SDUPTHER

## 2025-05-19 RX ORDER — SODIUM CHLORIDE, SODIUM LACTATE, POTASSIUM CHLORIDE, CALCIUM CHLORIDE 600; 310; 30; 20 MG/100ML; MG/100ML; MG/100ML; MG/100ML
INJECTION, SOLUTION INTRAVENOUS CONTINUOUS
Status: DISCONTINUED | OUTPATIENT
Start: 2025-05-19 | End: 2025-05-20 | Stop reason: SDUPTHER

## 2025-05-19 RX ORDER — MISOPROSTOL 200 UG/1
1000 TABLET ORAL PRN
Status: DISCONTINUED | OUTPATIENT
Start: 2025-05-19 | End: 2025-05-20

## 2025-05-19 RX ORDER — SODIUM CHLORIDE, SODIUM LACTATE, POTASSIUM CHLORIDE, AND CALCIUM CHLORIDE .6; .31; .03; .02 G/100ML; G/100ML; G/100ML; G/100ML
1000 INJECTION, SOLUTION INTRAVENOUS PRN
Status: DISCONTINUED | OUTPATIENT
Start: 2025-05-19 | End: 2025-05-20 | Stop reason: HOSPADM

## 2025-05-19 RX ORDER — DIPHENHYDRAMINE HYDROCHLORIDE 50 MG/ML
25 INJECTION, SOLUTION INTRAMUSCULAR; INTRAVENOUS
Status: DISCONTINUED | OUTPATIENT
Start: 2025-05-19 | End: 2025-05-20

## 2025-05-19 RX ORDER — DIPHENHYDRAMINE HCL 25 MG
25 TABLET ORAL EVERY 4 HOURS PRN
Status: DISCONTINUED | OUTPATIENT
Start: 2025-05-19 | End: 2025-05-20 | Stop reason: HOSPADM

## 2025-05-19 RX ORDER — BUTORPHANOL TARTRATE 1 MG/ML
1 INJECTION, SOLUTION INTRAMUSCULAR; INTRAVENOUS
Status: DISCONTINUED | OUTPATIENT
Start: 2025-05-19 | End: 2025-05-20 | Stop reason: HOSPADM

## 2025-05-19 RX ORDER — FENTANYL CITRATE 50 UG/ML
30 INJECTION, SOLUTION INTRAMUSCULAR; INTRAVENOUS
Status: DISCONTINUED | OUTPATIENT
Start: 2025-05-19 | End: 2025-05-20

## 2025-05-19 RX ORDER — ONDANSETRON 4 MG/1
4 TABLET, ORALLY DISINTEGRATING ORAL EVERY 6 HOURS PRN
Status: DISCONTINUED | OUTPATIENT
Start: 2025-05-19 | End: 2025-05-20 | Stop reason: SDUPTHER

## 2025-05-19 RX ORDER — FENTANYL CITRATE 50 UG/ML
100 INJECTION, SOLUTION INTRAMUSCULAR; INTRAVENOUS
Refills: 0 | Status: DISCONTINUED | OUTPATIENT
Start: 2025-05-19 | End: 2025-05-20 | Stop reason: HOSPADM

## 2025-05-19 RX ORDER — SODIUM CHLORIDE 0.9 % (FLUSH) 0.9 %
5-40 SYRINGE (ML) INJECTION PRN
Status: DISCONTINUED | OUTPATIENT
Start: 2025-05-19 | End: 2025-05-20 | Stop reason: HOSPADM

## 2025-05-19 RX ORDER — SODIUM CHLORIDE, SODIUM LACTATE, POTASSIUM CHLORIDE, AND CALCIUM CHLORIDE .6; .31; .03; .02 G/100ML; G/100ML; G/100ML; G/100ML
500 INJECTION, SOLUTION INTRAVENOUS PRN
Status: DISCONTINUED | OUTPATIENT
Start: 2025-05-19 | End: 2025-05-20 | Stop reason: HOSPADM

## 2025-05-19 RX ORDER — CARBOPROST TROMETHAMINE 250 UG/ML
250 INJECTION, SOLUTION INTRAMUSCULAR PRN
Status: DISCONTINUED | OUTPATIENT
Start: 2025-05-19 | End: 2025-05-20 | Stop reason: HOSPADM

## 2025-05-19 RX ORDER — DIPHENHYDRAMINE HYDROCHLORIDE 50 MG/ML
25 INJECTION, SOLUTION INTRAMUSCULAR; INTRAVENOUS EVERY 4 HOURS PRN
Status: DISCONTINUED | OUTPATIENT
Start: 2025-05-19 | End: 2025-05-19 | Stop reason: SDUPTHER

## 2025-05-19 RX ORDER — SODIUM CHLORIDE 0.9 % (FLUSH) 0.9 %
5-40 SYRINGE (ML) INJECTION EVERY 12 HOURS SCHEDULED
Status: DISCONTINUED | OUTPATIENT
Start: 2025-05-19 | End: 2025-05-20 | Stop reason: HOSPADM

## 2025-05-19 RX ORDER — SODIUM CHLORIDE 9 MG/ML
INJECTION, SOLUTION INTRAVENOUS PRN
Status: DISCONTINUED | OUTPATIENT
Start: 2025-05-19 | End: 2025-05-20 | Stop reason: HOSPADM

## 2025-05-19 RX ADMIN — SODIUM CHLORIDE, POTASSIUM CHLORIDE, SODIUM LACTATE AND CALCIUM CHLORIDE: 600; 310; 30; 20 INJECTION, SOLUTION INTRAVENOUS at 17:05

## 2025-05-19 RX ADMIN — SODIUM CHLORIDE, POTASSIUM CHLORIDE, SODIUM LACTATE AND CALCIUM CHLORIDE: 600; 310; 30; 20 INJECTION, SOLUTION INTRAVENOUS at 23:19

## 2025-05-19 RX ADMIN — FENTANYL CITRATE 100 MCG: 50 INJECTION INTRAMUSCULAR; INTRAVENOUS at 23:23

## 2025-05-19 RX ADMIN — Medication 1 MILLI-UNITS/MIN: at 18:46

## 2025-05-19 RX ADMIN — ONDANSETRON 4 MG: 2 INJECTION, SOLUTION INTRAMUSCULAR; INTRAVENOUS at 19:45

## 2025-05-19 ASSESSMENT — PAIN DESCRIPTION - DESCRIPTORS: DESCRIPTORS: CRAMPING;PRESSURE

## 2025-05-19 ASSESSMENT — PAIN SCALES - GENERAL: PAINLEVEL_OUTOF10: 7

## 2025-05-19 ASSESSMENT — PAIN DESCRIPTION - LOCATION: LOCATION: ABDOMEN;VAGINA

## 2025-05-19 ASSESSMENT — PAIN DESCRIPTION - ORIENTATION: ORIENTATION: LOWER

## 2025-05-19 ASSESSMENT — PAIN - FUNCTIONAL ASSESSMENT: PAIN_FUNCTIONAL_ASSESSMENT: ACTIVITIES ARE NOT PREVENTED

## 2025-05-19 NOTE — FLOWSHEET NOTE
Fetal monitor cords unplugged and to the bathroom after tension applied to the antony catheter to cervix and it is still intact.

## 2025-05-19 NOTE — FLOWSHEET NOTE
2 para 0 EDC 2025 37 5/7 weeks gestation in room 5c03 for induction of labor and says saw Dr. Stapleton today in the office and then went home.  Says the blood pressure was high in the office.

## 2025-05-19 NOTE — H&P
[facility]  History and Physical Update    Pt Name: Laura Moseley  MRN: [id number]  YOB: 2000  Date of evaluation: 2025    [] I have examined the patient and reviewed the H&P/Consult and there are no changes to the patient or plans.    [x] I have examined the patient and reviewed the H&P/Consult and have noted the following changes:     25yo  at 37/5 for IOL due to gHTN. She was seen in the office earlier today 130-140/90s. preE labs are pending. Cat 1 tracing. GBS neg. Pitocin to 4mu, increase once antony out.      Discussion with the patient and/ or family for proposed care, treatment, services; benefits, risks, side effects; likelihood of achieving goals and potential problems that may occur during recuperation was had and all questions were answered.  Discussion with the patient and/ or family of reasonable alternatives to the proposed care, treatment, services and the discussion of the risks, benefits, side effects related to the alternatives and the risk related to not receiving the proposed care treatment services was also had and all questions were answered.    If this is for an elective surgical procedure then The patient was counseled at length about the risks of joshua Covid-19 during their perioperative period and any recovery window from their procedure.  The patient was made aware that joshua Covid-19  may worsen their prognosis for recovering from their procedure  and lend to a higher morbidity and/or mortality risk.  All material risks, benefits, and reasonable alternatives including postponing the procedure were discussed. The patient  does wish to proceed with the procedure at this time.             Jayla Stapleton MD,MD  Electronically signed 2025 at 5:50 PM

## 2025-05-19 NOTE — FLOWSHEET NOTE
Fetal monitor applied to soft abdomen and the fetal heart tones are audible at 130 and on left side and is not tracing well.  Plan of care discussed and says is antony and pitocin induction.

## 2025-05-19 NOTE — FLOWSHEET NOTE
Dr. Stapleton placed the number 18 antony catheter to the cervix and filled the balloon with 60 ml's of sterile fluid as Dr. Stapleton requested.

## 2025-05-19 NOTE — FLOWSHEET NOTE
Says will decline ordering from the hospital menu at this time and mother and step father and Ron at side.  Drinking water at bedside.

## 2025-05-20 ENCOUNTER — ANESTHESIA (OUTPATIENT)
Dept: LABOR AND DELIVERY | Age: 25
End: 2025-05-20
Payer: COMMERCIAL

## 2025-05-20 ENCOUNTER — ANESTHESIA EVENT (OUTPATIENT)
Dept: LABOR AND DELIVERY | Age: 25
End: 2025-05-20
Payer: COMMERCIAL

## 2025-05-20 LAB — RPR SER QL: NONREACTIVE

## 2025-05-20 PROCEDURE — 6370000000 HC RX 637 (ALT 250 FOR IP): Performed by: OBSTETRICS & GYNECOLOGY

## 2025-05-20 PROCEDURE — 7200000001 HC VAGINAL DELIVERY

## 2025-05-20 PROCEDURE — 0UQMXZZ REPAIR VULVA, EXTERNAL APPROACH: ICD-10-PCS | Performed by: OBSTETRICS & GYNECOLOGY

## 2025-05-20 PROCEDURE — 3E033VJ INTRODUCTION OF OTHER HORMONE INTO PERIPHERAL VEIN, PERCUTANEOUS APPROACH: ICD-10-PCS | Performed by: OBSTETRICS & GYNECOLOGY

## 2025-05-20 PROCEDURE — 2500000003 HC RX 250 WO HCPCS: Performed by: NURSE ANESTHETIST, CERTIFIED REGISTERED

## 2025-05-20 PROCEDURE — 0U7C7DJ DILATION OF CERVIX WITH INTRALUM DEV, TEMP, VIA OPENING: ICD-10-PCS | Performed by: OBSTETRICS & GYNECOLOGY

## 2025-05-20 PROCEDURE — 6360000002 HC RX W HCPCS: Performed by: NURSE ANESTHETIST, CERTIFIED REGISTERED

## 2025-05-20 PROCEDURE — 3700000025 EPIDURAL BLOCK: Performed by: ANESTHESIOLOGY

## 2025-05-20 PROCEDURE — 1220000000 HC SEMI PRIVATE OB R&B

## 2025-05-20 PROCEDURE — 2580000003 HC RX 258: Performed by: OBSTETRICS & GYNECOLOGY

## 2025-05-20 PROCEDURE — 0KQM0ZZ REPAIR PERINEUM MUSCLE, OPEN APPROACH: ICD-10-PCS | Performed by: OBSTETRICS & GYNECOLOGY

## 2025-05-20 PROCEDURE — 6360000002 HC RX W HCPCS

## 2025-05-20 PROCEDURE — 6360000002 HC RX W HCPCS: Performed by: OBSTETRICS & GYNECOLOGY

## 2025-05-20 RX ORDER — NALOXONE HYDROCHLORIDE 0.4 MG/ML
INJECTION, SOLUTION INTRAMUSCULAR; INTRAVENOUS; SUBCUTANEOUS PRN
Status: DISCONTINUED | OUTPATIENT
Start: 2025-05-20 | End: 2025-05-20 | Stop reason: HOSPADM

## 2025-05-20 RX ORDER — MISOPROSTOL 200 UG/1
800 TABLET ORAL PRN
Status: DISCONTINUED | OUTPATIENT
Start: 2025-05-20 | End: 2025-05-22 | Stop reason: HOSPADM

## 2025-05-20 RX ORDER — ROPIVACAINE HYDROCHLORIDE 2 MG/ML
INJECTION, SOLUTION EPIDURAL; INFILTRATION; PERINEURAL
Status: DISCONTINUED | OUTPATIENT
Start: 2025-05-20 | End: 2025-05-20 | Stop reason: SDUPTHER

## 2025-05-20 RX ORDER — ROPIVACAINE HYDROCHLORIDE 2 MG/ML
INJECTION, SOLUTION EPIDURAL; INFILTRATION; PERINEURAL
Status: COMPLETED
Start: 2025-05-20 | End: 2025-05-20

## 2025-05-20 RX ORDER — LIDOCAINE HYDROCHLORIDE 20 MG/ML
INJECTION, SOLUTION EPIDURAL; INFILTRATION; INTRACAUDAL; PERINEURAL
Status: DISCONTINUED
Start: 2025-05-20 | End: 2025-05-20

## 2025-05-20 RX ORDER — OXYCODONE HYDROCHLORIDE 5 MG/1
5 TABLET ORAL EVERY 6 HOURS PRN
Status: DISCONTINUED | OUTPATIENT
Start: 2025-05-20 | End: 2025-05-22 | Stop reason: HOSPADM

## 2025-05-20 RX ORDER — SODIUM CHLORIDE, SODIUM LACTATE, POTASSIUM CHLORIDE, CALCIUM CHLORIDE 600; 310; 30; 20 MG/100ML; MG/100ML; MG/100ML; MG/100ML
INJECTION, SOLUTION INTRAVENOUS CONTINUOUS
Status: DISCONTINUED | OUTPATIENT
Start: 2025-05-20 | End: 2025-05-22 | Stop reason: HOSPADM

## 2025-05-20 RX ORDER — ONDANSETRON 2 MG/ML
4 INJECTION INTRAMUSCULAR; INTRAVENOUS EVERY 6 HOURS PRN
Status: DISCONTINUED | OUTPATIENT
Start: 2025-05-20 | End: 2025-05-20 | Stop reason: HOSPADM

## 2025-05-20 RX ORDER — ESCITALOPRAM OXALATE 20 MG/1
20 TABLET ORAL DAILY
Status: DISCONTINUED | OUTPATIENT
Start: 2025-05-20 | End: 2025-05-22 | Stop reason: HOSPADM

## 2025-05-20 RX ORDER — TRANEXAMIC ACID 10 MG/ML
1000 INJECTION, SOLUTION INTRAVENOUS
Status: DISCONTINUED | OUTPATIENT
Start: 2025-05-20 | End: 2025-05-22 | Stop reason: HOSPADM

## 2025-05-20 RX ORDER — FERROUS SULFATE 325(65) MG
325 TABLET ORAL
Status: DISCONTINUED | OUTPATIENT
Start: 2025-05-21 | End: 2025-05-22 | Stop reason: HOSPADM

## 2025-05-20 RX ORDER — ACETAMINOPHEN 500 MG
1000 TABLET ORAL EVERY 8 HOURS SCHEDULED
Status: DISCONTINUED | OUTPATIENT
Start: 2025-05-20 | End: 2025-05-22 | Stop reason: HOSPADM

## 2025-05-20 RX ORDER — ESCITALOPRAM OXALATE 20 MG/1
20 TABLET ORAL DAILY
Status: DISCONTINUED | OUTPATIENT
Start: 2025-05-20 | End: 2025-05-20

## 2025-05-20 RX ORDER — NALBUPHINE HYDROCHLORIDE 10 MG/ML
5 INJECTION INTRAMUSCULAR; INTRAVENOUS; SUBCUTANEOUS EVERY 4 HOURS PRN
Status: DISCONTINUED | OUTPATIENT
Start: 2025-05-20 | End: 2025-05-20 | Stop reason: HOSPADM

## 2025-05-20 RX ORDER — SODIUM CHLORIDE 0.9 % (FLUSH) 0.9 %
5-40 SYRINGE (ML) INJECTION EVERY 12 HOURS SCHEDULED
Status: DISCONTINUED | OUTPATIENT
Start: 2025-05-20 | End: 2025-05-22 | Stop reason: HOSPADM

## 2025-05-20 RX ORDER — LIDOCAINE HYDROCHLORIDE AND EPINEPHRINE BITARTRATE 20; .01 MG/ML; MG/ML
INJECTION, SOLUTION SUBCUTANEOUS
Status: DISCONTINUED
Start: 2025-05-20 | End: 2025-05-20 | Stop reason: WASHOUT

## 2025-05-20 RX ORDER — CARBOPROST TROMETHAMINE 250 UG/ML
250 INJECTION, SOLUTION INTRAMUSCULAR PRN
Status: DISCONTINUED | OUTPATIENT
Start: 2025-05-20 | End: 2025-05-22 | Stop reason: HOSPADM

## 2025-05-20 RX ORDER — BUSPIRONE HYDROCHLORIDE 7.5 MG/1
15 TABLET ORAL 2 TIMES DAILY
Status: DISCONTINUED | OUTPATIENT
Start: 2025-05-20 | End: 2025-05-20

## 2025-05-20 RX ORDER — SODIUM CHLORIDE 0.9 % (FLUSH) 0.9 %
5-40 SYRINGE (ML) INJECTION PRN
Status: DISCONTINUED | OUTPATIENT
Start: 2025-05-20 | End: 2025-05-22 | Stop reason: HOSPADM

## 2025-05-20 RX ORDER — LIDOCAINE HYDROCHLORIDE AND EPINEPHRINE 20; 5 MG/ML; UG/ML
INJECTION, SOLUTION EPIDURAL; INFILTRATION; INTRACAUDAL; PERINEURAL
Status: DISCONTINUED
Start: 2025-05-20 | End: 2025-05-20

## 2025-05-20 RX ORDER — IBUPROFEN 800 MG/1
800 TABLET, FILM COATED ORAL EVERY 8 HOURS SCHEDULED
Status: DISCONTINUED | OUTPATIENT
Start: 2025-05-20 | End: 2025-05-22 | Stop reason: HOSPADM

## 2025-05-20 RX ORDER — MODIFIED LANOLIN
OINTMENT (GRAM) TOPICAL PRN
Status: DISCONTINUED | OUTPATIENT
Start: 2025-05-20 | End: 2025-05-22 | Stop reason: HOSPADM

## 2025-05-20 RX ORDER — DIPHENHYDRAMINE HCL 25 MG
25 TABLET ORAL NIGHTLY PRN
Status: DISCONTINUED | OUTPATIENT
Start: 2025-05-20 | End: 2025-05-22 | Stop reason: HOSPADM

## 2025-05-20 RX ORDER — BISACODYL 10 MG
10 SUPPOSITORY, RECTAL RECTAL DAILY PRN
Status: DISCONTINUED | OUTPATIENT
Start: 2025-05-20 | End: 2025-05-22 | Stop reason: HOSPADM

## 2025-05-20 RX ORDER — SODIUM CHLORIDE 9 MG/ML
INJECTION, SOLUTION INTRAVENOUS PRN
Status: DISCONTINUED | OUTPATIENT
Start: 2025-05-20 | End: 2025-05-22 | Stop reason: HOSPADM

## 2025-05-20 RX ORDER — EPHEDRINE SULFATE/0.9% NACL/PF 25 MG/5 ML
10 SYRINGE (ML) INTRAVENOUS
Status: DISCONTINUED | OUTPATIENT
Start: 2025-05-20 | End: 2025-05-20 | Stop reason: HOSPADM

## 2025-05-20 RX ORDER — BUSPIRONE HYDROCHLORIDE 7.5 MG/1
15 TABLET ORAL 2 TIMES DAILY
Status: DISCONTINUED | OUTPATIENT
Start: 2025-05-20 | End: 2025-05-22 | Stop reason: HOSPADM

## 2025-05-20 RX ORDER — IBUPROFEN 800 MG/1
800 TABLET, FILM COATED ORAL ONCE
Status: COMPLETED | OUTPATIENT
Start: 2025-05-20 | End: 2025-05-20

## 2025-05-20 RX ORDER — DOCUSATE SODIUM 100 MG/1
100 CAPSULE, LIQUID FILLED ORAL 2 TIMES DAILY PRN
Status: DISCONTINUED | OUTPATIENT
Start: 2025-05-20 | End: 2025-05-22 | Stop reason: HOSPADM

## 2025-05-20 RX ORDER — ONDANSETRON 2 MG/ML
4 INJECTION INTRAMUSCULAR; INTRAVENOUS EVERY 6 HOURS PRN
Status: DISCONTINUED | OUTPATIENT
Start: 2025-05-20 | End: 2025-05-22 | Stop reason: HOSPADM

## 2025-05-20 RX ORDER — MISOPROSTOL 200 UG/1
TABLET ORAL
Status: DISCONTINUED
Start: 2025-05-20 | End: 2025-05-20 | Stop reason: WASHOUT

## 2025-05-20 RX ORDER — METHYLERGONOVINE MALEATE 0.2 MG/ML
200 INJECTION INTRAVENOUS PRN
Status: DISCONTINUED | OUTPATIENT
Start: 2025-05-20 | End: 2025-05-22 | Stop reason: HOSPADM

## 2025-05-20 RX ORDER — ONDANSETRON 4 MG/1
4 TABLET, ORALLY DISINTEGRATING ORAL EVERY 6 HOURS PRN
Status: DISCONTINUED | OUTPATIENT
Start: 2025-05-20 | End: 2025-05-22 | Stop reason: HOSPADM

## 2025-05-20 RX ADMIN — BUSPIRONE HYDROCHLORIDE 15 MG: 7.5 TABLET ORAL at 20:47

## 2025-05-20 RX ADMIN — ROPIVACAINE HYDROCHLORIDE 10 ML: 2 INJECTION, SOLUTION EPIDURAL; INFILTRATION at 06:31

## 2025-05-20 RX ADMIN — ONDANSETRON 4 MG: 2 INJECTION, SOLUTION INTRAMUSCULAR; INTRAVENOUS at 02:57

## 2025-05-20 RX ADMIN — ESCITALOPRAM OXALATE 20 MG: 20 TABLET, FILM COATED ORAL at 09:52

## 2025-05-20 RX ADMIN — DOCUSATE SODIUM 100 MG: 100 CAPSULE, LIQUID FILLED ORAL at 20:48

## 2025-05-20 RX ADMIN — Medication 16 ML/HR: at 00:41

## 2025-05-20 RX ADMIN — Medication 166.7 ML: at 11:03

## 2025-05-20 RX ADMIN — DIPHENHYDRAMINE HYDROCHLORIDE 25 MG: 25 TABLET ORAL at 21:53

## 2025-05-20 RX ADMIN — Medication 87.3 MILLI-UNITS/MIN: at 11:14

## 2025-05-20 RX ADMIN — Medication: at 11:46

## 2025-05-20 RX ADMIN — ACETAMINOPHEN 1000 MG: 500 TABLET ORAL at 15:57

## 2025-05-20 RX ADMIN — IBUPROFEN 800 MG: 800 TABLET, FILM COATED ORAL at 20:48

## 2025-05-20 RX ADMIN — SODIUM CHLORIDE, POTASSIUM CHLORIDE, SODIUM LACTATE AND CALCIUM CHLORIDE: 600; 310; 30; 20 INJECTION, SOLUTION INTRAVENOUS at 04:38

## 2025-05-20 RX ADMIN — IBUPROFEN 800 MG: 800 TABLET, FILM COATED ORAL at 12:59

## 2025-05-20 RX ADMIN — BUSPIRONE HYDROCHLORIDE 15 MG: 7.5 TABLET ORAL at 09:52

## 2025-05-20 RX ADMIN — SODIUM CHLORIDE, POTASSIUM CHLORIDE, SODIUM LACTATE AND CALCIUM CHLORIDE: 600; 310; 30; 20 INJECTION, SOLUTION INTRAVENOUS at 10:02

## 2025-05-20 ASSESSMENT — PAIN SCALES - GENERAL
PAINLEVEL_OUTOF10: 4
PAINLEVEL_OUTOF10: 3

## 2025-05-20 ASSESSMENT — PAIN DESCRIPTION - LOCATION
LOCATION: PERINEUM
LOCATION: ABDOMEN
LOCATION: PERINEUM

## 2025-05-20 ASSESSMENT — PAIN - FUNCTIONAL ASSESSMENT: PAIN_FUNCTIONAL_ASSESSMENT: ACTIVITIES ARE NOT PREVENTED

## 2025-05-20 ASSESSMENT — PAIN DESCRIPTION - DESCRIPTORS
DESCRIPTORS: CRAMPING;DISCOMFORT
DESCRIPTORS: ACHING;DISCOMFORT
DESCRIPTORS: ACHING;SORE

## 2025-05-20 NOTE — FLOWSHEET NOTE
Up to the bathroom voided quantity sufficient pericare per pt. Dermaplast and tucks used, clean pad put in place. Gown changed. Pt to wheelchair and taken to 5B21 in stable condition with  daughter in arms. Report to RAMIN Jack RN @ 8115

## 2025-05-20 NOTE — ANESTHESIA PROCEDURE NOTES
Epidural Block    Patient location during procedure: procedural area  Start time: 5/20/2025 12:11 AM  End time: 5/20/2025 12:41 AM  Reason for block: labor epidural  Staffing  Performed: resident/CRNA   Anesthesiologist: Parish Dawkins DO  Resident/CRNA: Yaquelin Ibrahim APRN - CRNA  Performed by: Yaquelin Ibrahim APRN - CRNA  Authorized by: Parish Dawkins DO    Epidural  Patient position: sitting  Prep: ChloraPrep and site prepped and draped  Patient monitoring: cardiac monitor, continuous pulse ox and frequent blood pressure checks  Approach: midline  Location: L3-4  Injection technique: DELMI air  Guidance: paresthesia technique  Provider prep: mask and sterile gloves  Needle  Needle type: Tuohy   Needle gauge: 18 G  Needle length: 3.5 in  Needle insertion depth: 9 cm  Catheter type: side hole  Catheter size: 20 G  Catheter at skin depth: 16 cm  Test dose: negativeCatheter Secured: tegaderm and tape  Assessment  Sensory level: T6  Attempts: 2 (first had positive test dose.)  Outcomes: patient tolerated procedure well  Additional Notes  Rest given between attempt   second attempt, start 0034  Preanesthetic Checklist  Completed: patient identified, IV checked, site marked, risks and benefits discussed, surgical/procedural consents, equipment checked, pre-op evaluation, timeout performed, anesthesia consent given, oxygen available, monitors applied/VS acknowledged, fire risk safety assessment completed and verbalized and blood product R/B/A discussed and consented

## 2025-05-20 NOTE — PROGRESS NOTES
Department of Obstetrics and Gynecology  Labor and Delivery   Progress Note  [facility] Attending      SUBJECTIVE:  asked by Daya to perform AROM for augmentation    OBJECTIVE:      Fetal heart rate: cat 1 NST         Contraction frequency: irreg minutes    Membranes:  Ruptured meconium stained -large amount - performed with IFM    Cervix:3/70/-3 Vertex. IFM placed                 ASSESSMENT     37 week IOL for Gest Htn  PLAN:    Arom for augmentation at Dr Elizondo Request.  Plan continue Pitocin. May need IUPC as well

## 2025-05-20 NOTE — FLOWSHEET NOTE
Dr Stapleton returned page update given; prolonged decel noted down to the 90's with pt vomiting. Severe range BP of 178/95 while pt was in intense pain while laying on her right side. Repeat BP of 119/65. FHT recovered with interventions. FHT's now 130's with early decels and accels. No change in POC

## 2025-05-20 NOTE — ANESTHESIA PROCEDURE NOTES
Epidural Block    Patient location during procedure: OB  Start time: 5/20/2025 2:10 AM  End time: 5/20/2025 2:18 AM  Reason for block: labor epidural  Staffing  Performed: resident/CRNA   Anesthesiologist: Parish Dawkins DO  Resident/CRNA: Yaquelin Ibrahim APRN - CRNA  Performed by: Yaquelin Ibrahim APRN - CRNA  Authorized by: Parsih Dawkins DO    Epidural  Patient position: sitting  Prep: ChloraPrep and site prepped and draped  Patient monitoring: cardiac monitor and frequent blood pressure checks  Approach: midline  Location: L2-3  Injection technique: DELMI air  Guidance: paresthesia technique  Provider prep: mask and sterile gloves  Needle  Needle type: Tuohy   Needle length: 6 in (4.5 inch)  Needle insertion depth: 11 cm  Catheter type: side hole  Catheter size: 20 G  Catheter at skin depth: 17 cm  Test dose: negativeCatheter Secured: tegaderm and tape  Assessment  Sensory level: T6 (currently not feeling contactions)  Hemodynamics: stable  Attempts: 1  Outcomes: uncomplicated and patient tolerated procedure well  Preanesthetic Checklist  Completed: patient identified, IV checked, site marked, risks and benefits discussed, surgical/procedural consents, equipment checked, pre-op evaluation, timeout performed, anesthesia consent given, oxygen available, monitors applied/VS acknowledged, fire risk safety assessment completed and verbalized and blood product R/B/A discussed and consented

## 2025-05-20 NOTE — L&D DELIVERY NOTE
Department of Obstetrics and Gynecology  Spontaneous Vaginal Delivery Note      Pt Name: Laura Moseley  MRN: [id number] Acct #: [id number]  YOB: 2000  Procedure Performed By: Jayla Stapleton MD, MD        Pre-operative Diagnosis:  Term pregnancy, Induced labor, Single fetus, and Pregnancy complicated by: gHTN    Post-operative Diagnosis: Same, delivered.    Procedure: spontaneous vaginal delivery    Surgeon:  Jayla Stapleton    Information for the patient's :  Lenard Moseley [[id number]]        Anesthesia:  epidural anesthesia    Estimated blood loss:  200 ml    Complications:  none    Condition:  infant stable to general nursery and mother stable    Delivery Summary:  The patient is a 24 y.o.  at 37w6d who was admitted for gestational hypertension and induction. She received the following interventions: AROM, Cervical antony bulb ripening, and IV pitocin induction.  The patient progressed well,did receive an epidural, became complete and started to push. She was placed in the dorsal lithotomy position and prepped. She delivered the baby which was then placed on the mother's abdomen. Cord was clamped and cut and infant handed off to the waiting nurse for evaluation. The placenta delivered intact, whole and that the umbilical cord had three vessels noted. The perineum and vagina were explored and  Second degree and periurethral  laceration was repaired. The second degree was repaired with 3-0 Vicryl and the periurethral laceration was repaired with 4-0 Vicryl.       Vaginal sweep was performed at the conclusion of delivery and all needles were taken off the field. Sponge counts correct      PMH:  Past Medical History:   Diagnosis Date    Anemia     taking iron this pregnancy    Anxiety     Depression     Eczema     Enlarged tonsils     GERD (gastroesophageal reflux disease)          Jayla Stapleton MD

## 2025-05-20 NOTE — ANESTHESIA POSTPROCEDURE EVALUATION
Department of Anesthesiology  Postprocedure Note    Patient: Laura Moseley  MRN: [id number]  YOB: 2000  Date of evaluation: 5/20/2025    Procedure Summary       Date: 05/20/25 Room / Location:     Anesthesia Start: 0011 Anesthesia Stop: 1101    Procedure: Labor Analgesia Diagnosis:     Scheduled Providers:  Responsible Provider: Parish Dawkins DO    Anesthesia Type: epidural ASA Status: 2            Anesthesia Type: No value filed.    Jose Daniel Phase I: Jose Daniel Score: 9    Jose Daniel Phase II: Jose Daniel Score: 10    Anesthesia Post Evaluation    Patient location during evaluation: floor  Patient participation: complete - patient participated  Level of consciousness: awake and alert  Airway patency: patent  Nausea & Vomiting: no nausea and no vomiting  Cardiovascular status: hemodynamically stable  Respiratory status: acceptable and spontaneous ventilation  Hydration status: euvolemic  Pain management: satisfactory to patient        No notable events documented.

## 2025-05-20 NOTE — ANESTHESIA PRE PROCEDURE
Department of Anesthesiology  Preprocedure Note       Name:  Laura Moseley   Age:  24 y.o.  :  2000                                          MRN:  [id number]         Date:  2025      Surgeon: * No surgeons listed *    Procedure: * No procedures listed *    Medications prior to admission:   Prior to Admission medications    Medication Sig Start Date End Date Taking? Authorizing Provider   Ferrous Sulfate (IRON PO) Take 1 tablet by mouth daily   Yes Vane Rogers MD   busPIRone (BUSPAR) 15 MG tablet TAKE 1 TABLET BY MOUTH TWICE A DAY AS DIRECTED 3/10/25  Yes Ananth Bella MD   escitalopram (LEXAPRO) 20 MG tablet TAKE 1 TABLET BY MOUTH EVERY DAY AS DIRECTED 25  Yes Ananth Bella MD   omeprazole (PRILOSEC) 40 MG delayed release capsule TAKE 1 CAPSULE BY MOUTH EVERY DAY 25  Yes Marcell Chadwick APRN - CNP   Prenatal Vit-Fe Fumarate-FA (PRENATAL VITAMINS) 28-0.8 MG TABS Take 1 tablet by mouth daily 24  Yes Vane Rogers MD   famotidine (PEPCID) 20 MG tablet Take 1 tablet by mouth 2 times daily   Yes Vane Rogers MD   ondansetron (ZOFRAN-ODT) 4 MG disintegrating tablet Take 1 tablet by mouth every 8 hours as needed for Nausea or Vomiting   Yes ProviderVane MD       Current medications:    Current Facility-Administered Medications   Medication Dose Route Frequency Provider Last Rate Last Admin    lidocaine PF 2 % injection             Lidocaine-EPINEPHrine (PF) 2 percent-[id number] injection             fentaNYL 750 mcg, ROPivacaine 0.1% in sodium chloride 0.9% 265mL (OB) epidural  16 mL/hr Epidural Continuous Yaquelin Ibrahim APRN - CRNA        naloxone 0.4 mg in 10 mL sodium chloride syringe   IntraVENous PRN Yaquelin Ibrahim APRN - CRNA        ePHEDrine Sulfate-NaCl injection 10 mg  10 mg IntraVENous Once PRN Yaquelin Ibrahim APRN - CRNA        nalbuphine (NUBAIN) injection 5 mg  5 mg IntraVENous Q4H PRN Yaquelin Ibrahim APRN - CRNA        ondansetron

## 2025-05-20 NOTE — FLOWSHEET NOTE
Patient up to bathroom for second time. Large successful void. Small amount of lochia noted no clots. Jeannie care done per patient. Patient back to bed. Fundus midline firm at U/-1. IV out per order. Andria Saavedra RN

## 2025-05-20 NOTE — FLOWSHEET NOTE
Dr Stapleton at bedside update given, VE 6cm. Pt epidural was just redosed and is comfortable. Pt is asking about taking her home meds for anxiety. Order received.

## 2025-05-21 LAB
FETAL CELL SCN BLD QL ROSETTE: NORMAL
HGB BLD-MCNC: 9.9 GM/DL (ref 12–16)

## 2025-05-21 PROCEDURE — 6360000002 HC RX W HCPCS: Performed by: OBSTETRICS & GYNECOLOGY

## 2025-05-21 PROCEDURE — 36415 COLL VENOUS BLD VENIPUNCTURE: CPT

## 2025-05-21 PROCEDURE — 85461 HEMOGLOBIN FETAL: CPT

## 2025-05-21 PROCEDURE — 96372 THER/PROPH/DIAG INJ SC/IM: CPT

## 2025-05-21 PROCEDURE — 6370000000 HC RX 637 (ALT 250 FOR IP): Performed by: OBSTETRICS & GYNECOLOGY

## 2025-05-21 PROCEDURE — 85018 HEMOGLOBIN: CPT

## 2025-05-21 PROCEDURE — 1220000000 HC SEMI PRIVATE OB R&B

## 2025-05-21 RX ORDER — FAMOTIDINE 20 MG/1
20 TABLET, FILM COATED ORAL DAILY
Status: DISCONTINUED | OUTPATIENT
Start: 2025-05-22 | End: 2025-05-22 | Stop reason: HOSPADM

## 2025-05-21 RX ADMIN — IBUPROFEN 800 MG: 800 TABLET, FILM COATED ORAL at 23:27

## 2025-05-21 RX ADMIN — BUSPIRONE HYDROCHLORIDE 15 MG: 7.5 TABLET ORAL at 08:34

## 2025-05-21 RX ADMIN — ACETAMINOPHEN 1000 MG: 500 TABLET ORAL at 02:12

## 2025-05-21 RX ADMIN — BUSPIRONE HYDROCHLORIDE 15 MG: 7.5 TABLET ORAL at 20:17

## 2025-05-21 RX ADMIN — ESCITALOPRAM OXALATE 20 MG: 20 TABLET, FILM COATED ORAL at 20:17

## 2025-05-21 RX ADMIN — HUMAN RHO(D) IMMUNE GLOBULIN 300 MCG: 300 INJECTION, SOLUTION INTRAMUSCULAR at 18:09

## 2025-05-21 RX ADMIN — DOCUSATE SODIUM 100 MG: 100 CAPSULE, LIQUID FILLED ORAL at 20:17

## 2025-05-21 RX ADMIN — ONDANSETRON 4 MG: 4 TABLET, ORALLY DISINTEGRATING ORAL at 02:24

## 2025-05-21 RX ADMIN — ACETAMINOPHEN 1000 MG: 500 TABLET ORAL at 19:01

## 2025-05-21 RX ADMIN — IBUPROFEN 800 MG: 800 TABLET, FILM COATED ORAL at 05:39

## 2025-05-21 RX ADMIN — ACETAMINOPHEN 1000 MG: 500 TABLET ORAL at 11:16

## 2025-05-21 RX ADMIN — IBUPROFEN 800 MG: 800 TABLET, FILM COATED ORAL at 14:52

## 2025-05-21 RX ADMIN — DOCUSATE SODIUM 100 MG: 100 CAPSULE, LIQUID FILLED ORAL at 08:34

## 2025-05-21 ASSESSMENT — PAIN DESCRIPTION - LOCATION
LOCATION: BACK
LOCATION: BACK
LOCATION: PERINEUM
LOCATION: ABDOMEN
LOCATION: ABDOMEN;PERINEUM

## 2025-05-21 ASSESSMENT — PAIN DESCRIPTION - ORIENTATION: ORIENTATION: LOWER

## 2025-05-21 ASSESSMENT — PAIN - FUNCTIONAL ASSESSMENT
PAIN_FUNCTIONAL_ASSESSMENT: ACTIVITIES ARE NOT PREVENTED

## 2025-05-21 ASSESSMENT — PAIN SCALES - GENERAL
PAINLEVEL_OUTOF10: 5
PAINLEVEL_OUTOF10: 2
PAINLEVEL_OUTOF10: 3
PAINLEVEL_OUTOF10: 1
PAINLEVEL_OUTOF10: 4

## 2025-05-21 ASSESSMENT — PAIN DESCRIPTION - DESCRIPTORS
DESCRIPTORS: SORE
DESCRIPTORS: ACHING;SORE
DESCRIPTORS: ACHING;SORE
DESCRIPTORS: SORE
DESCRIPTORS: DISCOMFORT

## 2025-05-21 NOTE — PROGRESS NOTES
Advance Care Planning     Advance Care Planning Inpatient Note  Spiritual Care Department    Today's Date: 5/21/2025  Unit: [facility]    Received request from IDT Member.  Upon review of chart and communication with care team, Spiritual Care will defer advance care planning with patient at this time.. Patient and Spouse was/were present in the room during visit.    Goals of ACP Conversation:  Discuss advance care planning documents    Health Care Decision Makers:     No healthcare decision makers have been documented.    Summary:  No Decision Maker named by patient at this time    Advance Care Planning Documents (Patient Wishes):  None     Assessment:  I responded to a spiritual consult to discuss ACP/LW document with Dulce Maria, a 24 year old female to discuss ACP/LW document. Patient is awake, alert and oriented to time and place. Patient has her spouse in the room holding their newly born baby girl. During this encounter, we discussed ACP document pertaining to POA. Decision Maker and Code status. I discussed DNR-CC and DNR-CCA code status with patient and patient and her spouse shared with me they have and idea of what the document is about. They concluded they will take document and discuss it among themselves and decide what to do about it. Patient said she will reach out to Spiritual Health department staff with any questions they may have. Spiritual Health staff service remain available to patient as needed at this time.    Interventions:  Encouraged ongoing ACP conversation with future decision makers and loved ones    Care Preferences Communicated:   No    Outcomes/Plan:  ACP Discussion: Postponed    Electronically signed by ZEB Mistry on 5/21/2025 at 11:25 AM

## 2025-05-21 NOTE — PROGRESS NOTES
Department of Obstetrics and Gynecology  Labor and Delivery  Attending Post Partum Progress Note    PPD #1    SUBJECTIVE: Feeling well. No complaints.    OBJECTIVE:     Vitals:  /82   Pulse 96   Temp 98 °F (36.7 °C) (Oral)   Resp 16   Ht 1.727 m (5' 8\")   Wt (!) 176 kg (388 lb)   SpO2 98%   Breastfeeding Unknown   BMI 59.00 kg/m²     Uterus:  normal size, well involuted, firm, non-tender    DATA:    Hemoglobin:   Lab Results   Component Value Date/Time    HGB 9.9 05/21/2025 06:52 AM       ASSESSMENT & PLAN:    Doing well.  Dispo: Plan for discharge home on day #2      Jayla Stapleton MD 5/21/2025

## 2025-05-21 NOTE — PROGRESS NOTES
RN requested I see pt at bedside d/t complaints of back pain and questions regarding epidural. Back assessed and no visible issues, pt able to move all extremities and has been up moving around and no fever. Pt educated on epidural process and all questions answered.

## 2025-05-22 VITALS
HEART RATE: 100 BPM | HEIGHT: 68 IN | BODY MASS INDEX: 44.41 KG/M2 | RESPIRATION RATE: 16 BRPM | OXYGEN SATURATION: 98 % | SYSTOLIC BLOOD PRESSURE: 109 MMHG | TEMPERATURE: 97.7 F | DIASTOLIC BLOOD PRESSURE: 88 MMHG | WEIGHT: 293 LBS

## 2025-05-22 PROCEDURE — 6370000000 HC RX 637 (ALT 250 FOR IP): Performed by: OBSTETRICS & GYNECOLOGY

## 2025-05-22 RX ADMIN — IBUPROFEN 800 MG: 800 TABLET, FILM COATED ORAL at 07:46

## 2025-05-22 RX ADMIN — DOCUSATE SODIUM 100 MG: 100 CAPSULE, LIQUID FILLED ORAL at 07:46

## 2025-05-22 RX ADMIN — ACETAMINOPHEN 1000 MG: 500 TABLET ORAL at 03:05

## 2025-05-22 RX ADMIN — BUSPIRONE HYDROCHLORIDE 15 MG: 7.5 TABLET ORAL at 07:46

## 2025-05-22 RX ADMIN — FAMOTIDINE 20 MG: 20 TABLET, FILM COATED ORAL at 07:45

## 2025-05-22 ASSESSMENT — PAIN DESCRIPTION - DESCRIPTORS: DESCRIPTORS: SORE

## 2025-05-22 ASSESSMENT — PAIN - FUNCTIONAL ASSESSMENT: PAIN_FUNCTIONAL_ASSESSMENT: ACTIVITIES ARE NOT PREVENTED

## 2025-05-22 ASSESSMENT — PAIN SCALES - GENERAL: PAINLEVEL_OUTOF10: 3

## 2025-05-22 ASSESSMENT — PAIN DESCRIPTION - LOCATION: LOCATION: BACK;PERINEUM

## 2025-05-22 NOTE — DISCHARGE SUMMARY
Vaginal Delivery Discharge Summary    Gestational Age:37w6d    Antepartum complications: gHTN    Admission date: 2025  4:09 PM      Type of Delivery:      [place] Data  Information for the patient's :  Lenard Moseley [[id number]]   female   Birth Weight: 3.23 kg (7 lb 1.9 oz)  Information for the patient's :  Lenard Moseley [[id number]]   APGAR One: 7   APGAR Five: 9    Labs: CBC   Lab Results   Component Value Date    HGB 9.9 (L) 2025    HCT 34.3 (L) 2025        Intrapartum complications: None    Postpartum complications: none    The patient is ambulating well. The patient is tolerating a normal diet.        Patient Instructions:   Activity: activity as tolerated and no sex for 6 weeks  Diet: regular  Wound Care: as directed    Discharge Information  Current Discharge Medication List        CONTINUE these medications which have NOT CHANGED    Details   Ferrous Sulfate (IRON PO) Take 1 tablet by mouth daily      busPIRone (BUSPAR) 15 MG tablet TAKE 1 TABLET BY MOUTH TWICE A DAY AS DIRECTED  Qty: 180 tablet, Refills: 1      escitalopram (LEXAPRO) 20 MG tablet TAKE 1 TABLET BY MOUTH EVERY DAY AS DIRECTED  Qty: 30 tablet, Refills: 5    Comments: The patient has updated the prescriber from the original prescriber      omeprazole (PRILOSEC) 40 MG delayed release capsule TAKE 1 CAPSULE BY MOUTH EVERY DAY  Qty: 30 capsule, Refills: 5      Prenatal Vit-Fe Fumarate-FA (PRENATAL VITAMINS) 28-0.8 MG TABS Take 1 tablet by mouth daily      famotidine (PEPCID) 20 MG tablet Take 1 tablet by mouth 2 times daily      ondansetron (ZOFRAN-ODT) 4 MG disintegrating tablet Take 1 tablet by mouth every 8 hours as needed for Nausea or Vomiting             No discharge procedures on file.    Condition: Good    Plan:   Follow up in 5 week(s)    Electronically signed by Jayla Stapleton MD on 2025 at 8:51 AM    Lenard Moseley [[id number]]      Labor Events     Labor: No  Cervical

## 2025-05-22 NOTE — FLOWSHEET NOTE
[facility]- Postpartum and [facility] Teaching / Discharge check lists completed and signed by mother/patient/ guardian.

## 2025-05-22 NOTE — LACTATION NOTE
Met with pt in room.  Reviewed flange size info about tongue tie and support available.  Offered to see outpt prn.

## 2025-05-22 NOTE — PLAN OF CARE
Problem: Postpartum  Goal: Experiences normal postpartum course  Description:  Postpartum OB-Pregnancy care plan goal which identifies if the mother is experiencing a normal postpartum course  5/21/2025 1043 by Andria Saavedra, RN  Outcome: Progressing  Flowsheets (Taken 5/21/2025 1043)  Experiences Normal Postpartum Course:   Monitor maternal vital signs   Assess uterine involution     Problem: Pain  Goal: Verbalizes/displays adequate comfort level or baseline comfort level  5/21/2025 1043 by Andria Saavedra, RN  Outcome: Progressing  Flowsheets (Taken 5/21/2025 1043)  Verbalizes/displays adequate comfort level or baseline comfort level:   Encourage patient to monitor pain and request assistance   Assess pain using appropriate pain scale     Problem: Infection - Adult  Goal: Absence of infection at discharge  5/21/2025 1043 by Andria Saavedra, RN  Outcome: Progressing  Flowsheets (Taken 5/21/2025 1043)  Absence of infection at discharge:   Assess and monitor for signs and symptoms of infection   Monitor lab/diagnostic results     Problem: Safety - Adult  Goal: Free from fall injury  5/21/2025 1043 by Andria Saavedra, RN  Outcome: Progressing  Flowsheets (Taken 5/21/2025 1043)  Free From Fall Injury: Instruct family/caregiver on patient safety     Problem: Discharge Planning  Goal: Discharge to home or other facility with appropriate resources  5/21/2025 1043 by Andria Saavedra, RN  Outcome: Progressing  Flowsheets (Taken 5/21/2025 1043)  Discharge to home or other facility with appropriate resources:   Identify barriers to discharge with patient and caregiver   Arrange for needed discharge resources and transportation as appropriate     Problem: Chronic Conditions and Co-morbidities  Goal: Patient's chronic conditions and co-morbidity symptoms are monitored and maintained or improved  5/21/2025 1043 by Andria Saavedra, RN  Outcome: Progressing  Flowsheets (Taken 5/21/2025 1043)  Care Plan - Patient's Chronic 
  Problem: Postpartum  Goal: Experiences normal postpartum course  Description:  Postpartum OB-Pregnancy care plan goal which identifies if the mother is experiencing a normal postpartum course  Outcome: Progressing  Flowsheets  Taken 5/22/2025 0317 by Eugenia Esteban RN  Experiences Normal Postpartum Course:   Monitor maternal vital signs   Assess uterine involution  Taken 5/21/2025 1532 by Katie Guillaume RN  Experiences Normal Postpartum Course:   Monitor maternal vital signs   Assess uterine involution     Problem: Pain  Goal: Verbalizes/displays adequate comfort level or baseline comfort level  Outcome: Progressing  Flowsheets (Taken 5/22/2025 0317)  Verbalizes/displays adequate comfort level or baseline comfort level: Encourage patient to monitor pain and request assistance     Problem: Infection - Adult  Goal: Absence of infection at discharge  Outcome: Progressing  Flowsheets  Taken 5/22/2025 0317 by Eugenia Esteban RN  Absence of infection at discharge:   Assess and monitor for signs and symptoms of infection   Monitor lab/diagnostic results  Taken 5/21/2025 1532 by Katie Guillaume RN  Absence of infection at discharge:   Assess and monitor for signs and symptoms of infection   Monitor lab/diagnostic results     Problem: Safety - Adult  Goal: Free from fall injury  Outcome: Progressing  Flowsheets  Taken 5/22/2025 0317 by Eugenia Esteban RN  Free From Fall Injury: Instruct family/caregiver on patient safety  Taken 5/21/2025 1532 by Katie Guillaume RN  Free From Fall Injury: Instruct family/caregiver on patient safety     Problem: Discharge Planning  Goal: Discharge to home or other facility with appropriate resources  Outcome: Progressing  Flowsheets  Taken 5/22/2025 0317 by Eugenia Esteban RN  Discharge to home or other facility with appropriate resources: Identify barriers to discharge with patient and caregiver  Taken 5/21/2025 1532 by Katie Guillaume RN  Discharge to home or other 
  Problem: Postpartum  Goal: Experiences normal postpartum course  Description:  Postpartum OB-Pregnancy care plan goal which identifies if the mother is experiencing a normal postpartum course  Outcome: Progressing  Flowsheets (Taken 5/20/2025 1444)  Experiences Normal Postpartum Course: Monitor maternal vital signs     Problem: Pain  Goal: Verbalizes/displays adequate comfort level or baseline comfort level  Outcome: Progressing  Flowsheets  Taken 5/20/2025 1320 by Kassidy Jack RN  Verbalizes/displays adequate comfort level or baseline comfort level: Encourage patient to monitor pain and request assistance  Taken 5/20/2025 0526 by Jessika Montano RN  Verbalizes/displays adequate comfort level or baseline comfort level:   Encourage patient to monitor pain and request assistance   Assess pain using appropriate pain scale   Notify Licensed Independent Practitioner if interventions unsuccessful or patient reports new pain  Note: Pain controlled with po meds. Discussed ice for perineal pain or the use of warm blanket/heating pad for uterine cramps. Pt states her pain goal 4/10 has been met.      Problem: Infection - Adult  Goal: Absence of infection at discharge  Outcome: Progressing  Flowsheets (Taken 5/20/2025 1444)  Absence of infection at discharge: Assess and monitor for signs and symptoms of infection     Problem: Safety - Adult  Goal: Free from fall injury  Outcome: Progressing  Flowsheets  Taken 5/20/2025 1320 by Kassidy Jack RN  Free From Fall Injury: Instruct family/caregiver on patient safety  Taken 5/20/2025 0526 by Jessika Montano RN  Free From Fall Injury:   Instruct family/caregiver on patient safety   Based on caregiver fall risk screen, instruct family/caregiver to ask for assistance with transferring infant if caregiver noted to have fall risk factors     Problem: Discharge Planning  Goal: Discharge to home or other facility with appropriate resources  Outcome: 
  Problem: Risk for Deficient Fluid Volume  Goal: Hemodynamic stability and optimal renal function maintained  Description: Interventions:.-Monitor labs and assess for signs and symptoms of volume excess or deficit  -Monitor intake, output and patient weight-Monitor response to interventions for patient's volume status, including labs, urine output, blood pressure (other measures as available)-Fluid restriction as ordered-Instruct patient on fluid and nutrition restrictions as appropriate  Outcome: Progressing  Flowsheets  Taken 5/21/2025 0520  Hemodynamic stability and optimal renal function maintained: Monitor labs and assess for signs and symptoms of volume excess or deficit  Taken 5/20/2025 2048  Hemodynamic stability and optimal renal function maintained: Monitor labs and assess for signs and symptoms of volume excess or deficit     Problem: Risk for Decreased Cardiac Output  Goal: Maintains optimal cardiac output and hemodynamic stability  Description: INTERVENTIONS:.-Monitor blood pressure and heart rate-Monitor urine output and notify licensed practitioner for values outside of -Assess for signes of decreased cardiac output-Administer fluid and /or volume expanders as ordered  Outcome: Progressing  Flowsheets  Taken 5/21/2025 0520  Maintains optimal cardiac output and hemodynamic stability: Monitor blood pressure and heart rate  Taken 5/20/2025 2048  Maintains optimal cardiac output and hemodynamic stability: Monitor blood pressure and heart rate  Goal: Achieves optimal ventilation and oxygenation  Description: INTERVENTIONS:.-Assess for changes in respiratory status -Assess for changes in mentation and behavior-Position to facilitate oxygenation and minimize respiratory effort-Oxygen supplementation based on oxygen saturation or arterial blood gases-Initiate smoking cessation protocol as indicated-Encourage broncho-pulmonary hygiene including cough, deep breathe, incentive spirometry-Assess the need for 
  Problem: Vaginal Birth or  Section  Goal: Fetal and maternal status remain reassuring during the birth process  Description:  Birth OB-Pregnancy care plan goal which identifies if the fetal and maternal status remain reassuring during the birth process  2025 by Jessika Montano RN  Outcome: Progressing  Flowsheets (Taken 2025)  Fetal and Maternal Status Remain Reassuring During the Birth Process:   Monitor vital signs   Monitor uterine activity   Monitor fetal heart rate   Monitor labor progression (Vaginal delivery)     Problem: Infection - Adult  Goal: Absence of infection during hospitalization  2025 by Jessika Montano RN  Outcome: Progressing  Flowsheets (Taken 2025)  Absence of infection during hospitalization:   Assess and monitor for signs and symptoms of infection   Instruct and encourage patient and family to use good hand hygiene technique     Problem: Safety - Adult  Goal: Free from fall injury  2025 by Jessika Montano RN  Outcome: Progressing  Flowsheets (Taken 2025)  Free From Fall Injury:   Instruct family/caregiver on patient safety   Based on caregiver fall risk screen, instruct family/caregiver to ask for assistance with transferring infant if caregiver noted to have fall risk factors     Problem: Discharge Planning  Goal: Discharge to home or other facility with appropriate resources  2025 by Jessika Montano RN  Outcome: Progressing  Flowsheets (Taken 2025)  Discharge to home or other facility with appropriate resources: Refer to discharge planning if patient needs post-hospital services based on physician order or complex needs related to functional status, cognitive ability or social support system     Problem: Chronic Conditions and Co-morbidities  Goal: Patient's chronic conditions and co-morbidity symptoms are monitored and maintained or improved  2025 by Jessika Montano RN  Outcome: 
  Problem: Vaginal Birth or  Section  Goal: Fetal and maternal status remain reassuring during the birth process  Description:  Birth OB-Pregnancy care plan goal which identifies if the fetal and maternal status remain reassuring during the birth process  2025 by Jossie Lopez RN  Outcome: Progressing  2025 by Tray Phillips RN  Outcome: Progressing  Flowsheets (Taken 2025)  Fetal and Maternal Status Remain Reassuring During the Birth Process:   Monitor vital signs   Monitor uterine activity   Monitor fetal heart rate   Monitor labor progression (Vaginal delivery)     Problem: Infection - Adult  Goal: Absence of infection during hospitalization  2025 by Jossie Lopez RN  Outcome: Progressing  2025 by Tray Phillips RN  Outcome: Progressing  Flowsheets (Taken 2025)  Absence of infection during hospitalization:   Assess and monitor for signs and symptoms of infection   Monitor lab/diagnostic results   Instruct and encourage patient and family to use good hand hygiene technique   Monitor all insertion sites i.e., indwelling lines, tubes and drains   Monitor endotracheal (as able) and nasal secretions for changes in amount and color   Administer medications as ordered   Identify and instruct in appropriate isolation precautions for identified infection/condition   [place] appropriate cooling/warming therapies per order     Problem: Safety - Adult  Goal: Free from fall injury  2025 by Jossie Lopez RN  Outcome: Progressing  2025 by Tray Phillips RN  Outcome: Progressing  Flowsheets (Taken 2025)  Free From Fall Injury:   Instruct family/caregiver on patient safety   Based on caregiver fall risk screen, instruct family/caregiver to ask for assistance with transferring infant if caregiver noted to have fall risk factors     Problem: Discharge Planning  Goal: Discharge to home or other facility with appropriate 
  Problem: Vaginal Birth or  Section  Goal: Fetal and maternal status remain reassuring during the birth process  Description:  Birth OB-Pregnancy care plan goal which identifies if the fetal and maternal status remain reassuring during the birth process  Outcome: Progressing  Flowsheets (Taken 20250)  Fetal and Maternal Status Remain Reassuring During the Birth Process:   Monitor vital signs   Monitor uterine activity   Monitor fetal heart rate   Monitor labor progression (Vaginal delivery)     Problem: Infection - Adult  Goal: Absence of infection during hospitalization  Outcome: Progressing  Flowsheets (Taken 2025)  Absence of infection during hospitalization:   Assess and monitor for signs and symptoms of infection   Monitor lab/diagnostic results   Instruct and encourage patient and family to use good hand hygiene technique   Monitor all insertion sites i.e., indwelling lines, tubes and drains   Monitor endotracheal (as able) and nasal secretions for changes in amount and color   Administer medications as ordered   Identify and instruct in appropriate isolation precautions for identified infection/condition   [place] appropriate cooling/warming therapies per order     Problem: Safety - Adult  Goal: Free from fall injury  Outcome: Progressing  Flowsheets (Taken 2025)  Free From Fall Injury:   Instruct family/caregiver on patient safety   Based on caregiver fall risk screen, instruct family/caregiver to ask for assistance with transferring infant if caregiver noted to have fall risk factors     Problem: Discharge Planning  Goal: Discharge to home or other facility with appropriate resources  Outcome: Progressing  Flowsheets (Taken 2025)  Discharge to home or other facility with appropriate resources: Identify barriers to discharge with patient and caregiver     Problem: Chronic Conditions and Co-morbidities  Goal: Patient's chronic conditions and co-morbidity 
are Monitored and Maintained or Improved: Monitor and assess patient's chronic conditions and comorbid symptoms for stability, deterioration, or improvement   Care plan reviewed with patient and she contributes to goal setting and voices understanding of plan of care.

## 2025-05-22 NOTE — PROGRESS NOTES
Postpartum education brochure given, teaching complete. [place] postpartum depression screening discussed with patient. Patient instructed to complete [place] postpartum depression screening in 2 weeks and contact her healthcare provider if her score is > 10. Patient voiced understanding.     Reviewed postpartum birth warning signs flyer with patient. Patient has voiced understanding of teaching.   Mother's blood type is oa negative.  Baby's blood type is a positive.  Mother did receive Rhogam on 5/21/25.    Infant has roomed in with mother this shift. Benefits of rooming in discussed.

## 2025-05-22 NOTE — PROGRESS NOTES
Department of Obstetrics and Gynecology  Labor and Delivery  Attending Post Partum Progress Note    PPD #2    SUBJECTIVE: Feeling well. No complaints.    OBJECTIVE:     Vitals:  /88   Pulse 100   Temp 97.7 °F (36.5 °C) (Oral)   Resp 16   Ht 1.727 m (5' 8\")   Wt (!) 176 kg (388 lb)   SpO2 98%   Breastfeeding Unknown   BMI 59.00 kg/m²     Uterus:  normal size, well involuted, firm, non-tender    DATA:    Hemoglobin:   Lab Results   Component Value Date/Time    HGB 9.9 05/21/2025 06:52 AM       ASSESSMENT & PLAN:    Doing well.  Dispo: Pt requesting home today      Jayla Stapleton MD 5/22/2025

## 2025-05-27 ENCOUNTER — OFFICE VISIT (OUTPATIENT)
Dept: FAMILY MEDICINE CLINIC | Age: 25
End: 2025-05-27
Payer: COMMERCIAL

## 2025-05-27 VITALS
RESPIRATION RATE: 16 BRPM | HEART RATE: 83 BPM | WEIGHT: 293 LBS | TEMPERATURE: 98.4 F | DIASTOLIC BLOOD PRESSURE: 76 MMHG | BODY MASS INDEX: 56.56 KG/M2 | SYSTOLIC BLOOD PRESSURE: 120 MMHG

## 2025-05-27 DIAGNOSIS — S80.11XA HEMATOMA OF RIGHT LOWER LEG: Primary | ICD-10-CM

## 2025-05-27 PROCEDURE — 99214 OFFICE O/P EST MOD 30 MIN: CPT | Performed by: NURSE PRACTITIONER

## 2025-05-27 ASSESSMENT — ENCOUNTER SYMPTOMS
COUGH: 0
EYE PAIN: 0
SHORTNESS OF BREATH: 0
VOMITING: 0
ABDOMINAL PAIN: 0
DIARRHEA: 0
SORE THROAT: 0
BACK PAIN: 0
ALLERGIC/IMMUNOLOGIC NEGATIVE: 1
CONSTIPATION: 0
RHINORRHEA: 0
RESPIRATORY NEGATIVE: 1
EYE DISCHARGE: 0
EYE REDNESS: 0
WHEEZING: 0
TROUBLE SWALLOWING: 0
EYES NEGATIVE: 1
NAUSEA: 0
GASTROINTESTINAL NEGATIVE: 1

## 2025-05-29 ENCOUNTER — CLINICAL SUPPORT (OUTPATIENT)
Dept: FAMILY MEDICINE CLINIC | Age: 25
End: 2025-05-29
Payer: COMMERCIAL

## 2025-05-29 DIAGNOSIS — Z20.822 EXPOSURE TO COVID-19 VIRUS: Primary | ICD-10-CM

## 2025-05-29 LAB
Lab: ABNORMAL
QC PASS/FAIL: ABNORMAL
SARS-COV-2 RDRP RESP QL NAA+PROBE: POSITIVE

## 2025-05-29 PROCEDURE — 87635 SARS-COV-2 COVID-19 AMP PRB: CPT | Performed by: NURSE PRACTITIONER

## 2025-05-29 NOTE — PROGRESS NOTES
Pt notified of positive covid test. At this time pt does not feel bad, reports a slight headache but says this is ongoing since being dc'd from the hospital.

## 2025-06-18 ENCOUNTER — OFFICE VISIT (OUTPATIENT)
Dept: SURGERY | Age: 25
End: 2025-06-18
Payer: COMMERCIAL

## 2025-06-18 VITALS
HEART RATE: 78 BPM | BODY MASS INDEX: 44.41 KG/M2 | TEMPERATURE: 97.2 F | WEIGHT: 293 LBS | DIASTOLIC BLOOD PRESSURE: 76 MMHG | SYSTOLIC BLOOD PRESSURE: 118 MMHG | OXYGEN SATURATION: 98 % | HEIGHT: 68 IN

## 2025-06-18 DIAGNOSIS — E66.01 MORBID OBESITY WITH BODY MASS INDEX (BMI) OF 50.0 TO 59.9 IN ADULT (HCC): ICD-10-CM

## 2025-06-18 DIAGNOSIS — S80.11XA HEMATOMA OF LEG, RIGHT, INITIAL ENCOUNTER: ICD-10-CM

## 2025-06-18 DIAGNOSIS — R22.41 SUBCUTANEOUS MASS OF RIGHT LOWER LEG: Primary | ICD-10-CM

## 2025-06-18 PROCEDURE — 99202 OFFICE O/P NEW SF 15 MIN: CPT | Performed by: SURGERY

## 2025-06-18 ASSESSMENT — ENCOUNTER SYMPTOMS
COUGH: 0
CONSTIPATION: 0
SINUS PAIN: 0
DIARRHEA: 0
EYE ITCHING: 0
TROUBLE SWALLOWING: 0
EYE PAIN: 0
EYE REDNESS: 0
PHOTOPHOBIA: 0
RHINORRHEA: 0
VOMITING: 0
RECTAL PAIN: 0
SHORTNESS OF BREATH: 0
ABDOMINAL PAIN: 0
EYE DISCHARGE: 0
NAUSEA: 0
BLOOD IN STOOL: 0
SORE THROAT: 0
ABDOMINAL DISTENTION: 0
CHEST TIGHTNESS: 0
FACIAL SWELLING: 0
STRIDOR: 0
BACK PAIN: 0
ANAL BLEEDING: 0
VOICE CHANGE: 0
COLOR CHANGE: 1
WHEEZING: 0
APNEA: 0
SINUS PRESSURE: 0
CHOKING: 0

## 2025-06-18 NOTE — PROGRESS NOTES
[facility]    [facility]  General Surgery  New Patient Evaluation in Office  Pt Name: Laura Moseley  Date of Birth 2000   Today's Date: 6/18/2025  Medical Record Number: [id number]  Referring Provider: Marek Duarte APRN -*  Primary Care Provider: Ananth Bella MD  Chief Complaint   Patient presents with    Surgical Consult     NP ref by Karson Duarte, CNP - Right lower leg hematoma     ASSESSMENT       Diagnosis Orders   1. Subcutaneous mass of right lower leg        2. Morbid obesity with body mass index (BMI) of 50.0 to 59.9 in adult (HCC)        3. Hematoma of leg, right, initial encounter           There are no active hospital problems to display for this patient.        PLANS   Assessment & Plan   Schedule Laura for nothing at this time  She states that over the past week its actually gotten smaller and reviewing the note from Karson Duarte he did a bedside ultrasound that appeared to him to be fluid-filled area and semisolid gelled hematoma which would be consistent with my exam as well  I think this will resolve over time and that I told her that if it does not show continued improvement over the next month then make an appointment come back and we will reevaluate her at that time.  I hesitate at this point to put a needle into it since it is currently sterile appears to be improving and likely will resolve without any intervention     SUBJECTIVE      Laura is a 24 y.o. female seen in the consultation for evaluation of a right leg hematoma.  She reports that she fell hitting her shin on a drawer about 2-1/2 months ago.  She said it got all black and blue encompassed a very large area in the front of her leg and its black and blue spread all the way down to her ankle.  She saw Karson Duarte in the office who did an ultrasound at the bedside that he felt this represented a fluid collection and that she says over the past week it starting to get a little bit smaller.

## 2025-06-18 NOTE — PROGRESS NOTES
Subjective   Patient ID: Laura Moseley is a 24 y.o. female.  Chief Complaint   Patient presents with    Surgical Consult     NP ref by Karson Duarte CNP - Right lower leg hematoma       HPI    Review of Systems   Constitutional:  Negative for activity change, appetite change, chills, diaphoresis, fatigue, fever and unexpected weight change.   HENT:  Negative for congestion, dental problem, drooling, ear discharge, ear pain, facial swelling, hearing loss, mouth sores, nosebleeds, postnasal drip, rhinorrhea, sinus pressure, sinus pain, sneezing, sore throat, tinnitus, trouble swallowing and voice change.    Eyes:  Negative for photophobia, pain, discharge, redness, itching and visual disturbance.   Respiratory:  Negative for apnea, cough, choking, chest tightness, shortness of breath, wheezing and stridor.    Cardiovascular:  Negative for chest pain, palpitations and leg swelling.   Gastrointestinal:  Negative for abdominal distention, abdominal pain, anal bleeding, blood in stool, constipation, diarrhea, nausea, rectal pain and vomiting.   Endocrine: Negative for cold intolerance, heat intolerance, polydipsia, polyphagia and polyuria.   Genitourinary:  Negative for decreased urine volume, difficulty urinating, dyspareunia, dysuria, enuresis, flank pain, frequency, genital sores, hematuria, menstrual problem, pelvic pain, urgency, vaginal bleeding, vaginal discharge and vaginal pain.   Musculoskeletal:  Negative for arthralgias, back pain, gait problem, joint swelling, myalgias, neck pain and neck stiffness.   Skin:  Positive for color change. Negative for pallor, rash and wound.   Allergic/Immunologic: Negative for environmental allergies, food allergies and immunocompromised state.   Neurological:  Negative for dizziness, tremors, seizures, syncope, facial asymmetry, speech difficulty, weakness, light-headedness, numbness and headaches.   Hematological:  Negative for adenopathy. Does not bruise/bleed easily.

## 2025-07-09 ENCOUNTER — OFFICE VISIT (OUTPATIENT)
Dept: FAMILY MEDICINE CLINIC | Age: 25
End: 2025-07-09

## 2025-07-09 VITALS
RESPIRATION RATE: 18 BRPM | BODY MASS INDEX: 45.99 KG/M2 | SYSTOLIC BLOOD PRESSURE: 116 MMHG | HEART RATE: 88 BPM | HEIGHT: 67 IN | WEIGHT: 293 LBS | DIASTOLIC BLOOD PRESSURE: 72 MMHG | OXYGEN SATURATION: 97 %

## 2025-07-09 DIAGNOSIS — Z71.3 ENCOUNTER FOR WEIGHT LOSS COUNSELING: Primary | ICD-10-CM

## 2025-07-09 ASSESSMENT — ENCOUNTER SYMPTOMS
TROUBLE SWALLOWING: 0
DIARRHEA: 0
EYE DISCHARGE: 0
EYE PAIN: 0
CONSTIPATION: 0
RHINORRHEA: 0
VOMITING: 0
ABDOMINAL PAIN: 0
NAUSEA: 0
EYE REDNESS: 0
COUGH: 0
BACK PAIN: 0
ALLERGIC/IMMUNOLOGIC NEGATIVE: 1
SHORTNESS OF BREATH: 0
SORE THROAT: 0
WHEEZING: 0

## 2025-07-09 NOTE — PROGRESS NOTES
[facility]  [address] [address]  Dept: [phone]  Dept Fax: [phone]  Loc: [phone]     Visit Date:  7/9/2025      Patient:  Luara Moseley  YOB: 2000    HPI:     Chief Complaint   Patient presents with    Weight Management     Discuss weight loss medication. Not breastfeeding. Patient currently gave birth in may.     Other     Patient was on beta blockers. Patient states she had trouble with her epidural not working to its full effect and when she went to the dentist they had trouble numbing her.        Weight Management  Pertinent negatives include no abdominal pain, arthralgias, congestion, coughing, fatigue, fever, headaches, myalgias, nausea, rash, sore throat, vomiting or weakness.     History of Present Illness  The patient is a 24-year-old female who presents for weight loss.    She has successfully lost over 40 pounds since giving birth but remains motivated to lose more. She is not interested in seeking help from a dietitian or weight loss center, preferring to manage her weight loss independently. She is currently on propranolol and quetiapine, which she believes may have impacted her ability to achieve numbness during dental procedures. She reports no use of illicit drugs.      Medications    Current Outpatient Medications:     Semaglutide-Weight Management (WEGOVY) 0.25 MG/0.5ML SOAJ SC injection, Inject 0.25 mg into the skin every 7 days for 4 doses, Disp: 2 mL, Rfl: 0    busPIRone (BUSPAR) 15 MG tablet, TAKE 1 TABLET BY MOUTH TWICE A DAY AS DIRECTED, Disp: 180 tablet, Rfl: 1    escitalopram (LEXAPRO) 20 MG tablet, TAKE 1 TABLET BY MOUTH EVERY DAY AS DIRECTED, Disp: 30 tablet, Rfl: 5    omeprazole (PRILOSEC) 40 MG delayed release capsule, TAKE 1 CAPSULE BY MOUTH EVERY DAY, Disp: 30 capsule, Rfl: 5    Prenatal Vit-Fe Fumarate-FA (PRENATAL VITAMINS) 28-0.8 MG TABS, Take 1 tablet by mouth daily,

## 2025-08-03 DIAGNOSIS — Z71.3 ENCOUNTER FOR WEIGHT LOSS COUNSELING: ICD-10-CM

## 2025-08-04 RX ORDER — SEMAGLUTIDE 0.25 MG/.5ML
INJECTION, SOLUTION SUBCUTANEOUS
OUTPATIENT
Start: 2025-08-04

## 2025-08-14 ENCOUNTER — TELEPHONE (OUTPATIENT)
Dept: FAMILY MEDICINE CLINIC | Age: 25
End: 2025-08-14

## 2025-08-14 ENCOUNTER — PATIENT MESSAGE (OUTPATIENT)
Dept: FAMILY MEDICINE CLINIC | Age: 25
End: 2025-08-14

## 2025-08-14 RX ORDER — ESCITALOPRAM OXALATE 20 MG/1
20 TABLET ORAL DAILY
Qty: 30 TABLET | Refills: 5 | Status: SHIPPED | OUTPATIENT
Start: 2025-08-14

## 2025-08-14 RX ORDER — OMEPRAZOLE 40 MG/1
CAPSULE, DELAYED RELEASE ORAL
Qty: 30 CAPSULE | Refills: 5 | Status: SHIPPED | OUTPATIENT
Start: 2025-08-14

## 2025-08-14 RX ORDER — BUSPIRONE HYDROCHLORIDE 15 MG/1
TABLET ORAL
Qty: 180 TABLET | Refills: 1 | Status: SHIPPED | OUTPATIENT
Start: 2025-08-14

## (undated) DEVICE — T&A: Brand: MEDLINE INDUSTRIES, INC.

## (undated) DEVICE — DEVICE TNSLCTMY OPN SEAL DIV BIZACT

## (undated) DEVICE — COAGULATOR SUCT 10FR LAIN FTSWCH ACTIVATION DISP VALLEYLAB

## (undated) DEVICE — GLOVE SURG SZ 75 L12IN FNGR THK94MIL WHT POLYMER LTX BEAD

## (undated) DEVICE — AGENT HEMSTAT 3GM OXIDIZED REGENERATED CELOS ABSRB FOR CONT (ORDER MULTIPLES OF 5EA)

## (undated) DEVICE — NEEDLE SPNL L3.5IN DIA25GA QNCKE TYP BVL SPINOCAN

## (undated) DEVICE — GAUZE,SPONGE,8"X4",12PLY,XRAY,STRL,LF: Brand: MEDLINE

## (undated) DEVICE — BLADE ES ELASTOMERIC COAT INSUL DURABLE BEND UPTO 90DEG

## (undated) DEVICE — SYRINGE MED 10ML TRNSLUC BRL PLUNG BLK MRK POLYPR CTRL